# Patient Record
Sex: FEMALE | Race: WHITE | Employment: OTHER
[De-identification: names, ages, dates, MRNs, and addresses within clinical notes are randomized per-mention and may not be internally consistent; named-entity substitution may affect disease eponyms.]

---

## 2022-02-21 ENCOUNTER — HOSPITAL ENCOUNTER (OUTPATIENT)
Dept: CT IMAGING | Facility: CLINIC | Age: 67
Discharge: HOME | End: 2022-02-23

## 2022-02-21 DIAGNOSIS — M43.17 ACQUIRED SPONDYLOLISTHESIS OF LUMBOSACRAL REGION: ICD-10-CM

## 2022-02-21 DIAGNOSIS — M54.17 RADICULOPATHY, LUMBOSACRAL REGION: ICD-10-CM

## 2022-07-06 ENCOUNTER — APPOINTMENT (OUTPATIENT)
Dept: CT IMAGING | Age: 67
End: 2022-07-06
Payer: MEDICARE

## 2022-07-06 ENCOUNTER — HOSPITAL ENCOUNTER (EMERGENCY)
Age: 67
Discharge: HOME | End: 2022-07-06
Attending: EMERGENCY MEDICINE
Payer: MEDICARE

## 2022-07-06 VITALS
HEART RATE: 68 BPM | TEMPERATURE: 97.8 F | RESPIRATION RATE: 14 BRPM | DIASTOLIC BLOOD PRESSURE: 78 MMHG | OXYGEN SATURATION: 97 % | SYSTOLIC BLOOD PRESSURE: 133 MMHG

## 2022-07-06 DIAGNOSIS — N39.0 URINARY TRACT INFECTION WITH HEMATURIA, SITE UNSPECIFIED: Primary | ICD-10-CM

## 2022-07-06 DIAGNOSIS — R31.9 URINARY TRACT INFECTION WITH HEMATURIA, SITE UNSPECIFIED: Primary | ICD-10-CM

## 2022-07-06 LAB
ABSOLUTE EOS #: 0 K/UL (ref 0–0.4)
ABSOLUTE LYMPH #: 0.45 K/UL (ref 1–4.8)
ABSOLUTE MONO #: 0.18 K/UL (ref 0.1–1.3)
ABSOLUTE RETIC #: 0.07 M/UL (ref 0.02–0.1)
ALBUMIN SERPL-MCNC: 3.7 G/DL (ref 3.5–5.2)
ALP BLD-CCNC: 139 U/L (ref 35–104)
ALT SERPL-CCNC: 10 U/L (ref 5–33)
ANION GAP SERPL CALCULATED.3IONS-SCNC: 10 MMOL/L (ref 9–17)
AST SERPL-CCNC: 26 U/L
BACTERIA: ABNORMAL
BASOPHILS # BLD: 0 % (ref 0–2)
BASOPHILS ABSOLUTE: 0 K/UL (ref 0–0.2)
BILIRUB SERPL-MCNC: 1.59 MG/DL (ref 0.3–1.2)
BILIRUBIN URINE: NEGATIVE
BUN BLDV-MCNC: 9 MG/DL (ref 8–23)
CALCIUM SERPL-MCNC: 9.2 MG/DL (ref 8.6–10.4)
CASTS UA: ABNORMAL /LPF
CHLORIDE BLD-SCNC: 103 MMOL/L (ref 98–107)
CO2: 26 MMOL/L (ref 20–31)
COLOR: YELLOW
CREAT SERPL-MCNC: 0.85 MG/DL (ref 0.5–0.9)
EOSINOPHILS RELATIVE PERCENT: 0 % (ref 0–4)
EPITHELIAL CELLS UA: ABNORMAL /HPF
GFR AFRICAN AMERICAN: >60 ML/MIN
GFR NON-AFRICAN AMERICAN: >60 ML/MIN
GFR SERPL CREATININE-BSD FRML MDRD: ABNORMAL ML/MIN/{1.73_M2}
GLUCOSE BLD-MCNC: 117 MG/DL (ref 70–99)
GLUCOSE URINE: NEGATIVE
HCT VFR BLD CALC: 41.3 % (ref 36–46)
HEMOGLOBIN: 14 G/DL (ref 12–16)
KETONES, URINE: ABNORMAL
LEUKOCYTE ESTERASE, URINE: ABNORMAL
LIPASE: 45 U/L (ref 13–60)
LYMPHOCYTES # BLD: 10 % (ref 24–44)
MCH RBC QN AUTO: 38.3 PG (ref 26–34)
MCHC RBC AUTO-ENTMCNC: 34 G/DL (ref 31–37)
MCV RBC AUTO: 112.4 FL (ref 80–100)
MONOCYTES # BLD: 4 % (ref 1–7)
MORPHOLOGY: ABNORMAL
MORPHOLOGY: ABNORMAL
NITRITE, URINE: NEGATIVE
PDW BLD-RTO: 18.6 % (ref 11.5–14.9)
PH UA: 7 (ref 5–8)
PLATELET # BLD: 129 K/UL (ref 150–450)
PMV BLD AUTO: 8.7 FL (ref 6–12)
POTASSIUM SERPL-SCNC: 4.2 MMOL/L (ref 3.7–5.3)
PROTEIN UA: ABNORMAL
RBC # BLD: 3.67 M/UL (ref 4–5.2)
RBC UA: ABNORMAL /HPF
RETIC %: 2 % (ref 0.5–2)
SEG NEUTROPHILS: 86 % (ref 36–66)
SEGMENTED NEUTROPHILS ABSOLUTE COUNT: 3.87 K/UL (ref 1.3–9.1)
SODIUM BLD-SCNC: 139 MMOL/L (ref 135–144)
SPECIFIC GRAVITY UA: 1.01 (ref 1–1.03)
TOTAL PROTEIN: 7.1 G/DL (ref 6.4–8.3)
TURBIDITY: ABNORMAL
URINE HGB: ABNORMAL
UROBILINOGEN, URINE: NORMAL
WBC # BLD: 4.5 K/UL (ref 3.5–11)
WBC UA: ABNORMAL /HPF

## 2022-07-06 PROCEDURE — 36415 COLL VENOUS BLD VENIPUNCTURE: CPT

## 2022-07-06 PROCEDURE — 80053 COMPREHEN METABOLIC PANEL: CPT

## 2022-07-06 PROCEDURE — 99284 EMERGENCY DEPT VISIT MOD MDM: CPT

## 2022-07-06 PROCEDURE — 85025 COMPLETE CBC W/AUTO DIFF WBC: CPT

## 2022-07-06 PROCEDURE — 6370000000 HC RX 637 (ALT 250 FOR IP): Performed by: EMERGENCY MEDICINE

## 2022-07-06 PROCEDURE — 74176 CT ABD & PELVIS W/O CONTRAST: CPT

## 2022-07-06 PROCEDURE — 83690 ASSAY OF LIPASE: CPT

## 2022-07-06 PROCEDURE — 85045 AUTOMATED RETICULOCYTE COUNT: CPT

## 2022-07-06 PROCEDURE — 81001 URINALYSIS AUTO W/SCOPE: CPT

## 2022-07-06 RX ORDER — 0.9 % SODIUM CHLORIDE 0.9 %
80 INTRAVENOUS SOLUTION INTRAVENOUS ONCE
Status: DISCONTINUED | OUTPATIENT
Start: 2022-07-06 | End: 2022-07-06

## 2022-07-06 RX ORDER — SODIUM CHLORIDE 0.9 % (FLUSH) 0.9 %
10 SYRINGE (ML) INJECTION PRN
Status: DISCONTINUED | OUTPATIENT
Start: 2022-07-06 | End: 2022-07-06

## 2022-07-06 RX ORDER — SULFAMETHOXAZOLE AND TRIMETHOPRIM 800; 160 MG/1; MG/1
1 TABLET ORAL ONCE
Status: COMPLETED | OUTPATIENT
Start: 2022-07-06 | End: 2022-07-06

## 2022-07-06 RX ORDER — SODIUM CHLORIDE 0.9 % (FLUSH) 0.9 %
3 SYRINGE (ML) INJECTION EVERY 8 HOURS
Status: DISCONTINUED | OUTPATIENT
Start: 2022-07-06 | End: 2022-07-06 | Stop reason: HOSPADM

## 2022-07-06 RX ORDER — SULFAMETHOXAZOLE AND TRIMETHOPRIM 800; 160 MG/1; MG/1
1 TABLET ORAL 2 TIMES DAILY
Qty: 20 TABLET | Refills: 0 | Status: SHIPPED | OUTPATIENT
Start: 2022-07-06 | End: 2022-07-16

## 2022-07-06 RX ADMIN — SULFAMETHOXAZOLE AND TRIMETHOPRIM 1 TABLET: 800; 160 TABLET ORAL at 14:20

## 2022-07-06 ASSESSMENT — ENCOUNTER SYMPTOMS
DIARRHEA: 1
VOMITING: 0
SORE THROAT: 0
SHORTNESS OF BREATH: 0
COLOR CHANGE: 0
EYE REDNESS: 0
NAUSEA: 1
EYE DISCHARGE: 0
STRIDOR: 0
ABDOMINAL PAIN: 1
WHEEZING: 0
CONSTIPATION: 0
COUGH: 0
EYE PAIN: 0

## 2022-07-06 ASSESSMENT — PAIN SCALES - GENERAL: PAINLEVEL_OUTOF10: 5

## 2022-07-06 ASSESSMENT — PAIN DESCRIPTION - LOCATION: LOCATION: GENERALIZED

## 2022-07-06 ASSESSMENT — PAIN - FUNCTIONAL ASSESSMENT: PAIN_FUNCTIONAL_ASSESSMENT: 0-10

## 2022-07-06 ASSESSMENT — PAIN DESCRIPTION - DESCRIPTORS: DESCRIPTORS: ACHING

## 2022-07-06 NOTE — ED NOTES
Patient informed RN that she was uncomfortable and wanted to leave. RN offered to seek pain relief orders from physician, patient declined and stated she would be leaving. While informing Dr. Mady Crowley patient exited facility. No IV in place at the time of exit.  Patient alert and oriented with decision making capacity      Jarocho Hicks RN  07/06/22 [id number]

## 2022-07-06 NOTE — ED PROVIDER NOTES
[address] [facility]  EMERGENCY DEPARTMENT ENCOUNTER      Pt Name: Yuliana Dodge  MRN: [id number]  [place] 1955  Date of evaluation: 7/6/2022  Provider: Tarik Matthews MD    CHIEF COMPLAINT       Chief Complaint   Patient presents with    Abdominal Pain    Mouth Lesions       HISTORY OF PRESENT ILLNESS  (Location/Symptom, Timing/Onset, Context/Setting, Quality, Duration, Modifying Factors, Severity.)   Yuliana Dodge is a 74 y.o. female who presents to the emergency department complaining of lower abdominal pain and some frequent urination. She relates some diarrhea as well. Nothing seems to make it better or worse. She has not noticed any blood in the urine or stool. No fevers or chills. She does feel like she could throw up but cannot. Like if she could she might feel better. She relates she usually does not feel like this and feels like she can get through most things but this was uncomfortable. She also tells me that she is concerned that she may have some mouth lesions on the top and bottom inner lip. She relates the top was worse yesterday and that seems better but still has some bumps on the lower inner lip. They are tender. Nursing Notes were reviewed. REVIEW OF SYSTEMS    (2-9 systems for level 4, 10 or more for level 5)     Review of Systems   Constitutional: Negative for activity change, appetite change, chills, fatigue and fever. HENT: Positive for mouth sores. Negative for congestion, ear pain and sore throat. Eyes: Negative for pain, discharge and redness. Respiratory: Negative for cough, shortness of breath, wheezing and stridor. Cardiovascular: Negative for chest pain. Gastrointestinal: Positive for abdominal pain, diarrhea and nausea. Negative for constipation and vomiting. Genitourinary: Positive for frequency. Negative for decreased urine volume and difficulty urinating. Musculoskeletal: Negative for arthralgias and myalgias.    Skin: Negative for color change and rash. Neurological: Negative for dizziness, weakness and headaches. Psychiatric/Behavioral: Negative for behavioral problems and confusion. Except as noted above the remainder of the review of systems was reviewed and negative. PAST MEDICAL HISTORY     Past Medical History:   Diagnosis Date    Chronic back pain     S/P L4/L5 OR    Hypertension     Hypothyroidism        SURGICAL HISTORY       Past Surgical History:   Procedure Laterality Date    CHOLECYSTECTOMY      HIP FRACTURE SURGERY Right 2016    closed reduction percutaneous pinning    HYSTERECTOMY (CERVIX STATUS UNKNOWN)      SPINE SURGERY      L4/L5       CURRENT MEDICATIONS       Previous Medications    ATENOLOL (TENORMIN) 25 MG TABLET    Take 25 mg by mouth daily    DOCUSATE CALCIUM (STOOL SOFTENER PO)    Take 2 capsules by mouth as needed. LEVOTHYROXINE (SYNTHROID) 125 MCG TABLET    Take 125 mcg by mouth Daily. POTASSIUM CHLORIDE SA (K-DUR;KLOR-CON M) 10 MEQ TABLET    Take 20 mEq by mouth 2 times daily. PREGABALIN (LYRICA) 100 MG CAPSULE    Take 100 mg by mouth 2 times daily. SILVER SULFADIAZINE (SILVADENE) 1 % CREAM    Apply topically twice a day    TAPENTADOL HCL (NUCYNTA) 75 MG TABS    Take 75 mg by mouth 2 times daily. VITAMIN D (ERGOCALCIFEROL) 09111 UNITS CAPSULE    Take 1 capsule by mouth once a week for 7 doses       ALLERGIES     Patient has no known allergies. FAMILY HISTORY           Problem Relation Age of Onset    High Blood Pressure Mother     Other Mother         low heart rate     Pacemaker Mother     Heart Attack Father     Lung Cancer Father     Cancer Sister         throat cancer x2    Breast Cancer Paternal Aunt     Cancer Paternal Aunt         multiple other cancers      Family Status   Relation Name Status    Mother     Murtaza Santoro Father      Sister  (Not Specified)    PAunt  (Not Specified)        SOCIAL HISTORY      reports that she has never smoked. She has never used smokeless tobacco. She reports that she does not drink alcohol and does not use drugs. PHYSICAL EXAM    (up to 7 for level 4, 8 or more for level 5)     ED Triage Vitals [07/06/22 1211]   BP Temp Temp src Heart Rate Resp SpO2 Height Weight   128/67 97.8 °F (36.6 °C) -- 74 16 98 % -- --     Physical Exam  Vitals and nursing note reviewed. Constitutional:       General: She is not in acute distress. Appearance: She is well-developed. She is not ill-appearing, toxic-appearing or diaphoretic. HENT:      Head: Normocephalic and atraumatic. Comments: I do not appreciate any mouth lesions when I look. No sign of thrush. Right Ear: External ear normal.      Left Ear: External ear normal.      Nose: Nose normal.      Mouth/Throat:      Mouth: Mucous membranes are moist.   Eyes:      General:         Right eye: No discharge. Left eye: No discharge. Extraocular Movements: Extraocular movements intact. Conjunctiva/sclera: Conjunctivae normal.      Pupils: Pupils are equal, round, and reactive to light. Cardiovascular:      Rate and Rhythm: Normal rate and regular rhythm. Heart sounds: Normal heart sounds. No murmur heard. Pulmonary:      Effort: Pulmonary effort is normal. No respiratory distress. Breath sounds: Normal breath sounds. No wheezing, rhonchi or rales. Chest:      Chest wall: No tenderness. Abdominal:      General: Bowel sounds are normal. There is no distension. Palpations: Abdomen is soft. There is no mass. Tenderness: There is no abdominal tenderness. There is no guarding or rebound. Musculoskeletal:         General: Normal range of motion. Cervical back: Normal range of motion and neck supple. Right lower leg: No edema. Left lower leg: No edema. Lymphadenopathy:      Cervical: No cervical adenopathy. Skin:     General: Skin is warm. Findings: No rash.    Neurological:      General: No focal deficit present. Mental Status: She is alert and oriented to person, place, and time. Motor: No abnormal muscle tone. Psychiatric:         Mood and Affect: Mood normal.         Behavior: Behavior normal.         DIAGNOSTIC RESULTS     EKG: All EKG's are interpreted by the Emergency Department Physician who either signs or Co-signs this chart in the absence of a cardiologist.    none    RADIOLOGY:   Non-plain film images such as CT, Ultrasound and MRI are read by the radiologist. Plain radiographic images are visualized and preliminarily interpreted by the emergency physician with the below findings:    Interpretation per the Radiologist below, if available at the time of this note:    CT ABDOMEN PELVIS WO CONTRAST Additional Contrast? None    (Results Pending)         ED BEDSIDE ULTRASOUND:   Performed by ED Physician - none    LABS:  Labs Reviewed   URINALYSIS WITH MICROSCOPIC - Abnormal; Notable for the following components:       Result Value    Turbidity UA Cloudy (*)     Ketones, Urine SMALL (*)     Urine Hgb SMALL (*)     Protein, UA TRACE (*)     Leukocyte Esterase, Urine LARGE (*)     All other components within normal limits   CBC WITH AUTO DIFFERENTIAL - Abnormal; Notable for the following components:    RBC 3.67 (*)     .4 (*)     MCH 38.3 (*)     RDW 18.6 (*)     Platelets 517 (*)     Seg Neutrophils 86 (*)     Lymphocytes 10 (*)     Absolute Lymph # 0.45 (*)     All other components within normal limits   COMPREHENSIVE METABOLIC PANEL - Abnormal; Notable for the following components:    Glucose 117 (*)     Alkaline Phosphatase 139 (*)     Total Bilirubin 1.59 (*)     All other components within normal limits   LIPASE   RETICULOCYTES   PERIPHERAL BLOOD SMEAR, PATH REVIEW       All other labs were within normal range or not returned as of this dictation.     EMERGENCY DEPARTMENT COURSE and DIFFERENTIAL DIAGNOSIS/MDM:   Vitals:    Vitals:    07/06/22 1211 07/06/22 1221 07/06/22 1421   BP: 128/67 133/78   Pulse: 74 74 68   Resp: 16  14   Temp: 97.8 °F (36.6 °C)     SpO2: 98%  97%     We did discuss lab work and urine. Certainly we will go ahead and give her some nausea medication as well. She is comfortable with plan of care and verbalizes understanding. I answered any questions she has at this time. Patient relates that she is planning on leaving at 3:00 if results are not back. She is more comfortable at home and would like to be discharged. CT imaging result is not back yet at the time. But I have already sent her prescription over for UTI and she is aware of this. We will plan on calling her if CT is abnormal.  But she will wait about 10 minutes she relates. CONSULTS:  None    PROCEDURES:  None    FINAL IMPRESSION      1.  Urinary tract infection with hematuria, site unspecified          DISPOSITION/PLAN   DISPOSITION  [facility]      PATIENT REFERRED TO:  Inez Marie, [id number] [place] [address]  [phone]    Call in 2 days        DISCHARGE MEDICATIONS:  New Prescriptions    SULFAMETHOXAZOLE-TRIMETHOPRIM (BACTRIM DS) 800-160 MG PER TABLET    Take 1 tablet by mouth 2 times daily for 10 days       (Please note that portions of this note were completed with a voice recognition program.  Efforts were made to edit the dictations but occasionally words are mis-transcribed.)    Bobbi Price MD  Attending Emergency Physician        Bobbi Price MD  07/06/22 [id number]

## 2022-07-07 LAB
PATHOLOGIST REVIEW: NORMAL
SURGICAL PATHOLOGY REPORT: NORMAL

## 2022-07-16 ENCOUNTER — APPOINTMENT (OUTPATIENT)
Dept: CT IMAGING | Age: 67
End: 2022-07-16
Payer: MEDICARE

## 2022-07-16 ENCOUNTER — APPOINTMENT (OUTPATIENT)
Dept: GENERAL RADIOLOGY | Age: 67
End: 2022-07-16
Payer: MEDICARE

## 2022-07-16 ENCOUNTER — HOSPITAL ENCOUNTER (EMERGENCY)
Age: 67
Discharge: HOME | End: 2022-07-16
Attending: EMERGENCY MEDICINE
Payer: MEDICARE

## 2022-07-16 VITALS
OXYGEN SATURATION: 100 % | DIASTOLIC BLOOD PRESSURE: 69 MMHG | HEART RATE: 70 BPM | BODY MASS INDEX: 29.92 KG/M2 | HEIGHT: 70 IN | SYSTOLIC BLOOD PRESSURE: 119 MMHG | RESPIRATION RATE: 16 BRPM | TEMPERATURE: 98 F | WEIGHT: 209 LBS

## 2022-07-16 DIAGNOSIS — R06.02 SHORTNESS OF BREATH: Primary | ICD-10-CM

## 2022-07-16 DIAGNOSIS — R91.1 LUNG NODULE: ICD-10-CM

## 2022-07-16 DIAGNOSIS — R05.9 COUGH: ICD-10-CM

## 2022-07-16 LAB
ABSOLUTE EOS #: 0 K/UL (ref 0–0.44)
ABSOLUTE IMMATURE GRANULOCYTE: 0.04 K/UL (ref 0–0.3)
ABSOLUTE LYMPH #: 0.55 K/UL (ref 1.1–3.7)
ABSOLUTE MONO #: 0.08 K/UL (ref 0.1–1.2)
ALBUMIN SERPL-MCNC: 4.5 G/DL (ref 3.5–5.2)
ALP BLD-CCNC: 158 U/L (ref 35–104)
ALT SERPL-CCNC: 14 U/L (ref 5–33)
ANION GAP SERPL CALCULATED.3IONS-SCNC: 17 MMOL/L (ref 9–17)
AST SERPL-CCNC: 32 U/L
BASOPHILS # BLD: 0 % (ref 0–2)
BASOPHILS ABSOLUTE: 0 K/UL (ref 0–0.2)
BILIRUB SERPL-MCNC: 1.47 MG/DL (ref 0.3–1.2)
BUN BLDV-MCNC: 19 MG/DL (ref 8–23)
BUN/CREAT BLD: 19 (ref 9–20)
CALCIUM SERPL-MCNC: 9.7 MG/DL (ref 8.6–10.4)
CHLORIDE BLD-SCNC: 92 MMOL/L (ref 98–107)
CO2: 25 MMOL/L (ref 20–31)
CREAT SERPL-MCNC: 1.01 MG/DL (ref 0.5–0.9)
EKG ATRIAL RATE: 70 BPM
EKG P AXIS: 11 DEGREES
EKG P-R INTERVAL: 204 MS
EKG Q-T INTERVAL: 426 MS
EKG QRS DURATION: 92 MS
EKG QTC CALCULATION (BAZETT): 460 MS
EKG R AXIS: 25 DEGREES
EKG T AXIS: -37 DEGREES
EKG VENTRICULAR RATE: 70 BPM
EOSINOPHILS RELATIVE PERCENT: 0 % (ref 1–4)
GFR AFRICAN AMERICAN: >60 ML/MIN
GFR NON-AFRICAN AMERICAN: 55 ML/MIN
GFR SERPL CREATININE-BSD FRML MDRD: ABNORMAL ML/MIN/{1.73_M2}
GLUCOSE BLD-MCNC: 87 MG/DL (ref 70–99)
HCT VFR BLD CALC: 40.9 % (ref 36.3–47.1)
HEMOGLOBIN: 14.4 G/DL (ref 11.9–15.1)
IMMATURE GRANULOCYTES: 1 %
LYMPHOCYTES # BLD: 13 % (ref 24–43)
MCH RBC QN AUTO: 39.7 PG (ref 25.2–33.5)
MCHC RBC AUTO-ENTMCNC: 35.2 G/DL (ref 28.4–34.8)
MCV RBC AUTO: 112.7 FL (ref 82.6–102.9)
MONOCYTES # BLD: 2 % (ref 3–12)
MORPHOLOGY: ABNORMAL
NRBC AUTOMATED: 0 PER 100 WBC
PDW BLD-RTO: 15.6 % (ref 11.8–14.4)
PLATELET # BLD: 106 K/UL (ref 138–453)
PMV BLD AUTO: 10.9 FL (ref 8.1–13.5)
POTASSIUM SERPL-SCNC: 3.9 MMOL/L (ref 3.7–5.3)
PRO-BNP: 178 PG/ML
RBC # BLD: 3.63 M/UL (ref 3.95–5.11)
SEG NEUTROPHILS: 84 % (ref 36–65)
SEGMENTED NEUTROPHILS ABSOLUTE COUNT: 3.53 K/UL (ref 1.5–8.1)
SODIUM BLD-SCNC: 134 MMOL/L (ref 135–144)
TOTAL PROTEIN: 8.1 G/DL (ref 6.4–8.3)
TROPONIN, HIGH SENSITIVITY: 9 NG/L (ref 0–14)
WBC # BLD: 4.2 K/UL (ref 3.5–11.3)

## 2022-07-16 PROCEDURE — 99285 EMERGENCY DEPT VISIT HI MDM: CPT

## 2022-07-16 PROCEDURE — 2580000003 HC RX 258: Performed by: EMERGENCY MEDICINE

## 2022-07-16 PROCEDURE — 6360000004 HC RX CONTRAST MEDICATION: Performed by: EMERGENCY MEDICINE

## 2022-07-16 PROCEDURE — 71045 X-RAY EXAM CHEST 1 VIEW: CPT

## 2022-07-16 PROCEDURE — 93005 ELECTROCARDIOGRAM TRACING: CPT | Performed by: EMERGENCY MEDICINE

## 2022-07-16 PROCEDURE — 71260 CT THORAX DX C+: CPT | Performed by: EMERGENCY MEDICINE

## 2022-07-16 PROCEDURE — 85025 COMPLETE CBC W/AUTO DIFF WBC: CPT

## 2022-07-16 PROCEDURE — 84484 ASSAY OF TROPONIN QUANT: CPT

## 2022-07-16 PROCEDURE — 80053 COMPREHEN METABOLIC PANEL: CPT

## 2022-07-16 PROCEDURE — 83880 ASSAY OF NATRIURETIC PEPTIDE: CPT

## 2022-07-16 PROCEDURE — 93010 ELECTROCARDIOGRAM REPORT: CPT | Performed by: INTERNAL MEDICINE

## 2022-07-16 RX ORDER — SODIUM CHLORIDE 0.9 % (FLUSH) 0.9 %
10 SYRINGE (ML) INJECTION PRN
Status: DISCONTINUED | OUTPATIENT
Start: 2022-07-16 | End: 2022-07-16 | Stop reason: HOSPADM

## 2022-07-16 RX ORDER — BENZONATATE 100 MG/1
100 CAPSULE ORAL 3 TIMES DAILY PRN
Qty: 21 CAPSULE | Refills: 0 | Status: SHIPPED | OUTPATIENT
Start: 2022-07-16 | End: 2022-07-23

## 2022-07-16 RX ORDER — 0.9 % SODIUM CHLORIDE 0.9 %
100 INTRAVENOUS SOLUTION INTRAVENOUS ONCE
Status: COMPLETED | OUTPATIENT
Start: 2022-07-16 | End: 2022-07-16

## 2022-07-16 RX ORDER — AZITHROMYCIN 250 MG/1
250 TABLET, FILM COATED ORAL SEE ADMIN INSTRUCTIONS
Qty: 6 TABLET | Refills: 0 | Status: SHIPPED | OUTPATIENT
Start: 2022-07-16 | End: 2022-07-21

## 2022-07-16 RX ADMIN — IOPAMIDOL 75 ML: 755 INJECTION, SOLUTION INTRAVENOUS at 11:54

## 2022-07-16 RX ADMIN — SODIUM CHLORIDE 80 ML: 9 INJECTION, SOLUTION INTRAVENOUS at 11:55

## 2022-07-16 RX ADMIN — SODIUM CHLORIDE, PRESERVATIVE FREE 10 ML: 5 INJECTION INTRAVENOUS at 11:54

## 2022-07-16 ASSESSMENT — ENCOUNTER SYMPTOMS
SHORTNESS OF BREATH: 1
SORE THROAT: 0
RHINORRHEA: 0
BACK PAIN: 0
ABDOMINAL PAIN: 0
COUGH: 1

## 2022-07-16 ASSESSMENT — PAIN - FUNCTIONAL ASSESSMENT: PAIN_FUNCTIONAL_ASSESSMENT: NONE - DENIES PAIN

## 2022-07-18 ENCOUNTER — HOSPITAL ENCOUNTER (OUTPATIENT)
Age: 67
Setting detail: SPECIMEN
Discharge: HOME | End: 2022-07-18

## 2022-07-18 LAB
ALPHA-1 ANTITRYPSIN: 210 MG/DL (ref 90–200)
IGA: 285 MG/DL (ref 70–400)
IGE: 105 IU/ML
IGG: 1587 MG/DL (ref 700–1600)
IGM: 160 MG/DL (ref 40–230)

## 2022-07-19 PROBLEM — E55.9 VITAMIN D DEFICIENCY: Status: ACTIVE | Noted: 2022-07-19

## 2022-07-19 PROBLEM — R91.1 PULMONARY NODULE: Status: ACTIVE | Noted: 2022-07-19

## 2022-07-19 PROBLEM — R06.02 SHORTNESS OF BREATH ON EXERTION: Status: ACTIVE | Noted: 2022-07-19

## 2022-07-19 PROBLEM — M15.9 PRIMARY OSTEOARTHRITIS INVOLVING MULTIPLE JOINTS: Status: ACTIVE | Noted: 2017-06-26

## 2022-07-19 PROBLEM — Z96.643 HISTORY OF BILATERAL HIP REPLACEMENTS: Status: ACTIVE | Noted: 2022-07-19

## 2022-07-19 PROBLEM — Z99.81 OXYGEN DEPENDENT: Status: ACTIVE | Noted: 2022-07-19

## 2022-07-19 PROBLEM — Z90.49 STATUS POST CHOLECYSTECTOMY: Status: ACTIVE | Noted: 2022-07-19

## 2022-07-19 PROBLEM — M15.0 PRIMARY OSTEOARTHRITIS INVOLVING MULTIPLE JOINTS: Status: ACTIVE | Noted: 2017-06-26

## 2022-07-27 LAB
ALPHA-1 ANTITRYPSIN PHENOTYPE: ABNORMAL
ALPHA-1 ANTITRYPSIN: 206 MG/DL (ref 90–200)

## 2022-07-29 ENCOUNTER — HOSPITAL ENCOUNTER (OUTPATIENT)
Dept: NON INVASIVE DIAGNOSTICS | Age: 67
Discharge: HOME | End: 2022-07-29

## 2022-08-12 ENCOUNTER — APPOINTMENT (OUTPATIENT)
Dept: GENERAL RADIOLOGY | Age: 67
DRG: 690 | End: 2022-08-12
Payer: MEDICARE

## 2022-08-12 ENCOUNTER — HOSPITAL ENCOUNTER (OUTPATIENT)
Age: 67
Setting detail: SPECIMEN
Discharge: HOME | End: 2022-08-12

## 2022-08-12 ENCOUNTER — HOSPITAL ENCOUNTER (INPATIENT)
Age: 67
LOS: 2 days | Discharge: HOME | DRG: 690 | End: 2022-08-14
Attending: EMERGENCY MEDICINE | Admitting: INTERNAL MEDICINE
Payer: MEDICARE

## 2022-08-12 DIAGNOSIS — R06.02 SHORTNESS OF BREATH ON EXERTION: ICD-10-CM

## 2022-08-12 DIAGNOSIS — E03.9 ACQUIRED HYPOTHYROIDISM: ICD-10-CM

## 2022-08-12 DIAGNOSIS — E55.9 VITAMIN D DEFICIENCY: ICD-10-CM

## 2022-08-12 DIAGNOSIS — R79.9 ABNORMAL FINDING OF BLOOD CHEMISTRY, UNSPECIFIED: ICD-10-CM

## 2022-08-12 DIAGNOSIS — R30.0 DYSURIA: ICD-10-CM

## 2022-08-12 DIAGNOSIS — R62.7 FAILURE TO THRIVE IN ADULT: ICD-10-CM

## 2022-08-12 DIAGNOSIS — N28.9 RENAL INSUFFICIENCY: ICD-10-CM

## 2022-08-12 DIAGNOSIS — I10 PRIMARY HYPERTENSION: ICD-10-CM

## 2022-08-12 DIAGNOSIS — R30.0 DYSURIA: Primary | ICD-10-CM

## 2022-08-12 PROBLEM — E87.6 HYPOKALEMIA: Status: ACTIVE | Noted: 2022-08-12

## 2022-08-12 PROBLEM — N39.0 UTI (URINARY TRACT INFECTION): Status: ACTIVE | Noted: 2022-08-12

## 2022-08-12 LAB
ALBUMIN SERPL-MCNC: 3.6 G/DL (ref 3.5–5.2)
ALBUMIN SERPL-MCNC: 4.3 G/DL (ref 3.5–5.2)
ALBUMIN/GLOBULIN RATIO: 1.3 (ref 1–2.5)
ALBUMIN/GLOBULIN RATIO: 1.4 (ref 1–2.5)
ALP BLD-CCNC: 85 U/L (ref 35–104)
ALP BLD-CCNC: 95 U/L (ref 35–104)
ALT SERPL-CCNC: 25 U/L (ref 5–33)
ALT SERPL-CCNC: 28 U/L (ref 5–33)
ANION GAP SERPL CALCULATED.3IONS-SCNC: 17 MMOL/L (ref 9–17)
ANION GAP SERPL CALCULATED.3IONS-SCNC: 18 MMOL/L (ref 9–17)
AST SERPL-CCNC: 36 U/L
AST SERPL-CCNC: 40 U/L
BILIRUB SERPL-MCNC: 3.36 MG/DL (ref 0.3–1.2)
BILIRUB SERPL-MCNC: 4.38 MG/DL (ref 0.3–1.2)
BILIRUBIN URINE: ABNORMAL
BUN BLDV-MCNC: 27 MG/DL (ref 8–23)
BUN BLDV-MCNC: 28 MG/DL (ref 8–23)
CALCIUM SERPL-MCNC: 8.9 MG/DL (ref 8.6–10.4)
CALCIUM SERPL-MCNC: 9.8 MG/DL (ref 8.6–10.4)
CASTS UA: ABNORMAL /LPF (ref 0–8)
CHLORIDE BLD-SCNC: 91 MMOL/L (ref 98–107)
CHLORIDE BLD-SCNC: 94 MMOL/L (ref 98–107)
CHOLESTEROL/HDL RATIO: 3.2
CHOLESTEROL: 98 MG/DL
CO2: 27 MMOL/L (ref 20–31)
CO2: 31 MMOL/L (ref 20–31)
COLOR: ABNORMAL
CREAT SERPL-MCNC: 0.74 MG/DL (ref 0.5–0.9)
CREAT SERPL-MCNC: 0.91 MG/DL (ref 0.5–0.9)
EPITHELIAL CELLS UA: ABNORMAL /HPF (ref 0–5)
GFR AFRICAN AMERICAN: >60 ML/MIN
GFR AFRICAN AMERICAN: >60 ML/MIN
GFR NON-AFRICAN AMERICAN: >60 ML/MIN
GFR NON-AFRICAN AMERICAN: >60 ML/MIN
GFR SERPL CREATININE-BSD FRML MDRD: ABNORMAL ML/MIN/{1.73_M2}
GFR SERPL CREATININE-BSD FRML MDRD: ABNORMAL ML/MIN/{1.73_M2}
GLUCOSE BLD-MCNC: 110 MG/DL (ref 70–99)
GLUCOSE BLD-MCNC: 89 MG/DL (ref 70–99)
GLUCOSE URINE: NEGATIVE
HCT VFR BLD CALC: 39 % (ref 36.3–47.1)
HDLC SERPL-MCNC: 31 MG/DL
HEMOGLOBIN: 13.7 G/DL (ref 11.9–15.1)
IRON: 247 UG/DL (ref 37–145)
KETONES, URINE: ABNORMAL
LACTIC ACID, WHOLE BLOOD: 2.6 MMOL/L (ref 0.7–2.1)
LDL CHOLESTEROL: 47 MG/DL (ref 0–130)
LEUKOCYTE ESTERASE, URINE: ABNORMAL
LIPASE: 156 U/L (ref 13–60)
MAGNESIUM: 2 MG/DL (ref 1.6–2.6)
MCH RBC QN AUTO: 40.5 PG (ref 25.2–33.5)
MCHC RBC AUTO-ENTMCNC: 35.1 G/DL (ref 28.4–34.8)
MCV RBC AUTO: 115.4 FL (ref 82.6–102.9)
NITRITE, URINE: POSITIVE
NRBC AUTOMATED: 0.8 PER 100 WBC
PDW BLD-RTO: 17.3 % (ref 11.8–14.4)
PH UA: 6 (ref 5–8)
PLATELET # BLD: 68 K/UL (ref 138–453)
PMV BLD AUTO: 12.1 FL (ref 8.1–13.5)
POTASSIUM SERPL-SCNC: 3.3 MMOL/L (ref 3.7–5.3)
POTASSIUM SERPL-SCNC: 3.6 MMOL/L (ref 3.7–5.3)
PROTEIN UA: ABNORMAL
RBC # BLD: 3.38 M/UL (ref 3.95–5.11)
RBC UA: ABNORMAL /HPF (ref 0–4)
REASON FOR REJECTION: NORMAL
SODIUM BLD-SCNC: 138 MMOL/L (ref 135–144)
SODIUM BLD-SCNC: 140 MMOL/L (ref 135–144)
SPECIFIC GRAVITY UA: 1.03 (ref 1–1.03)
THYROXINE, FREE: 1.02 NG/DL (ref 0.93–1.7)
TOTAL PROTEIN: 6.4 G/DL (ref 6.4–8.3)
TOTAL PROTEIN: 7.3 G/DL (ref 6.4–8.3)
TRIGL SERPL-MCNC: 102 MG/DL
TROPONIN, HIGH SENSITIVITY: 14 NG/L (ref 0–14)
TSH SERPL DL<=0.05 MIU/L-ACNC: 1.59 UIU/ML (ref 0.3–5)
TSH SERPL DL<=0.05 MIU/L-ACNC: 2.18 UIU/ML (ref 0.3–5)
TURBIDITY: CLEAR
URINE HGB: ABNORMAL
UROBILINOGEN, URINE: ABNORMAL
VITAMIN B-12: <150 PG/ML (ref 232–1245)
VITAMIN D 25-HYDROXY: 12.8 NG/ML
WBC # BLD: 5.1 K/UL (ref 3.5–11.3)
WBC UA: ABNORMAL /HPF (ref 0–5)
ZZ NTE CLEAN UP: ORDERED TEST: NORMAL
ZZ NTE WITH NAME CLEAN UP: SPECIMEN SOURCE: NORMAL

## 2022-08-12 PROCEDURE — 6360000002 HC RX W HCPCS: Performed by: STUDENT IN AN ORGANIZED HEALTH CARE EDUCATION/TRAINING PROGRAM

## 2022-08-12 PROCEDURE — 83690 ASSAY OF LIPASE: CPT

## 2022-08-12 PROCEDURE — 6360000002 HC RX W HCPCS

## 2022-08-12 PROCEDURE — 84443 ASSAY THYROID STIM HORMONE: CPT

## 2022-08-12 PROCEDURE — 2580000003 HC RX 258

## 2022-08-12 PROCEDURE — 36415 COLL VENOUS BLD VENIPUNCTURE: CPT

## 2022-08-12 PROCEDURE — 99285 EMERGENCY DEPT VISIT HI MDM: CPT

## 2022-08-12 PROCEDURE — G0378 HOSPITAL OBSERVATION PER HR: HCPCS

## 2022-08-12 PROCEDURE — 87186 SC STD MICRODIL/AGAR DIL: CPT

## 2022-08-12 PROCEDURE — 93005 ELECTROCARDIOGRAM TRACING: CPT | Performed by: STUDENT IN AN ORGANIZED HEALTH CARE EDUCATION/TRAINING PROGRAM

## 2022-08-12 PROCEDURE — 99222 1ST HOSP IP/OBS MODERATE 55: CPT | Performed by: INTERNAL MEDICINE

## 2022-08-12 PROCEDURE — 96372 THER/PROPH/DIAG INJ SC/IM: CPT

## 2022-08-12 PROCEDURE — 87040 BLOOD CULTURE FOR BACTERIA: CPT

## 2022-08-12 PROCEDURE — 81001 URINALYSIS AUTO W/SCOPE: CPT

## 2022-08-12 PROCEDURE — 6370000000 HC RX 637 (ALT 250 FOR IP)

## 2022-08-12 PROCEDURE — 96365 THER/PROPH/DIAG IV INF INIT: CPT

## 2022-08-12 PROCEDURE — 83735 ASSAY OF MAGNESIUM: CPT

## 2022-08-12 PROCEDURE — 87077 CULTURE AEROBIC IDENTIFY: CPT

## 2022-08-12 PROCEDURE — 84484 ASSAY OF TROPONIN QUANT: CPT

## 2022-08-12 PROCEDURE — 71046 X-RAY EXAM CHEST 2 VIEWS: CPT

## 2022-08-12 PROCEDURE — 2580000003 HC RX 258: Performed by: STUDENT IN AN ORGANIZED HEALTH CARE EDUCATION/TRAINING PROGRAM

## 2022-08-12 PROCEDURE — 80053 COMPREHEN METABOLIC PANEL: CPT

## 2022-08-12 PROCEDURE — 83605 ASSAY OF LACTIC ACID: CPT

## 2022-08-12 PROCEDURE — 87086 URINE CULTURE/COLONY COUNT: CPT

## 2022-08-12 PROCEDURE — 2060000000 HC ICU INTERMEDIATE R&B

## 2022-08-12 PROCEDURE — 96361 HYDRATE IV INFUSION ADD-ON: CPT

## 2022-08-12 RX ORDER — ACETAMINOPHEN 650 MG/1
650 SUPPOSITORY RECTAL EVERY 6 HOURS PRN
Status: DISCONTINUED | OUTPATIENT
Start: 2022-08-12 | End: 2022-08-14 | Stop reason: HOSPADM

## 2022-08-12 RX ORDER — SODIUM CHLORIDE 0.9 % (FLUSH) 0.9 %
5-40 SYRINGE (ML) INJECTION PRN
Status: DISCONTINUED | OUTPATIENT
Start: 2022-08-12 | End: 2022-08-14 | Stop reason: HOSPADM

## 2022-08-12 RX ORDER — GABAPENTIN 300 MG/1
300 CAPSULE ORAL 2 TIMES DAILY
Status: DISCONTINUED | OUTPATIENT
Start: 2022-08-12 | End: 2022-08-14 | Stop reason: HOSPADM

## 2022-08-12 RX ORDER — ONDANSETRON 4 MG/1
4 TABLET, ORALLY DISINTEGRATING ORAL EVERY 8 HOURS PRN
Status: DISCONTINUED | OUTPATIENT
Start: 2022-08-12 | End: 2022-08-14 | Stop reason: HOSPADM

## 2022-08-12 RX ORDER — SODIUM CHLORIDE 9 MG/ML
INJECTION, SOLUTION INTRAVENOUS CONTINUOUS
Status: DISCONTINUED | OUTPATIENT
Start: 2022-08-12 | End: 2022-08-14 | Stop reason: HOSPADM

## 2022-08-12 RX ORDER — FOLIC ACID 1 MG/1
1 TABLET ORAL DAILY
Status: DISCONTINUED | OUTPATIENT
Start: 2022-08-12 | End: 2022-08-14 | Stop reason: HOSPADM

## 2022-08-12 RX ORDER — MULTIVITAMIN WITH IRON
1 TABLET ORAL DAILY
Status: DISCONTINUED | OUTPATIENT
Start: 2022-08-12 | End: 2022-08-14 | Stop reason: HOSPADM

## 2022-08-12 RX ORDER — ATENOLOL 25 MG/1
25 TABLET ORAL DAILY
Status: DISCONTINUED | OUTPATIENT
Start: 2022-08-12 | End: 2022-08-14 | Stop reason: HOSPADM

## 2022-08-12 RX ORDER — 0.9 % SODIUM CHLORIDE 0.9 %
1000 INTRAVENOUS SOLUTION INTRAVENOUS ONCE
Status: COMPLETED | OUTPATIENT
Start: 2022-08-12 | End: 2022-08-12

## 2022-08-12 RX ORDER — ENOXAPARIN SODIUM 100 MG/ML
40 INJECTION SUBCUTANEOUS DAILY
Status: DISCONTINUED | OUTPATIENT
Start: 2022-08-12 | End: 2022-08-14 | Stop reason: HOSPADM

## 2022-08-12 RX ORDER — LEVOTHYROXINE SODIUM 0.1 MG/1
100 TABLET ORAL DAILY
Status: DISCONTINUED | OUTPATIENT
Start: 2022-08-12 | End: 2022-08-14 | Stop reason: HOSPADM

## 2022-08-12 RX ORDER — ACETAMINOPHEN 325 MG/1
650 TABLET ORAL EVERY 6 HOURS PRN
Status: DISCONTINUED | OUTPATIENT
Start: 2022-08-12 | End: 2022-08-14 | Stop reason: HOSPADM

## 2022-08-12 RX ORDER — SODIUM CHLORIDE 9 MG/ML
INJECTION, SOLUTION INTRAVENOUS PRN
Status: DISCONTINUED | OUTPATIENT
Start: 2022-08-12 | End: 2022-08-14 | Stop reason: HOSPADM

## 2022-08-12 RX ORDER — ERGOCALCIFEROL 1.25 MG/1
50000 CAPSULE ORAL WEEKLY
Status: DISCONTINUED | OUTPATIENT
Start: 2022-08-12 | End: 2022-08-14 | Stop reason: HOSPADM

## 2022-08-12 RX ORDER — POTASSIUM CHLORIDE 20 MEQ/1
40 TABLET, EXTENDED RELEASE ORAL ONCE
Status: COMPLETED | OUTPATIENT
Start: 2022-08-12 | End: 2022-08-12

## 2022-08-12 RX ORDER — POLYETHYLENE GLYCOL 3350 17 G/17G
17 POWDER, FOR SOLUTION ORAL DAILY PRN
Status: DISCONTINUED | OUTPATIENT
Start: 2022-08-12 | End: 2022-08-14 | Stop reason: HOSPADM

## 2022-08-12 RX ORDER — ONDANSETRON 2 MG/ML
4 INJECTION INTRAMUSCULAR; INTRAVENOUS EVERY 6 HOURS PRN
Status: DISCONTINUED | OUTPATIENT
Start: 2022-08-12 | End: 2022-08-14 | Stop reason: HOSPADM

## 2022-08-12 RX ORDER — SODIUM CHLORIDE 0.9 % (FLUSH) 0.9 %
5-40 SYRINGE (ML) INJECTION EVERY 12 HOURS SCHEDULED
Status: DISCONTINUED | OUTPATIENT
Start: 2022-08-12 | End: 2022-08-14 | Stop reason: HOSPADM

## 2022-08-12 RX ADMIN — LEVOTHYROXINE SODIUM 100 MCG: 100 TABLET ORAL at 19:59

## 2022-08-12 RX ADMIN — ALCOHOL 1 TABLET: 70.47 GEL TOPICAL at 19:59

## 2022-08-12 RX ADMIN — ERGOCALCIFEROL 50000 UNITS: 1.25 CAPSULE ORAL at 19:59

## 2022-08-12 RX ADMIN — ENOXAPARIN SODIUM 40 MG: 100 INJECTION SUBCUTANEOUS at 19:58

## 2022-08-12 RX ADMIN — CEFTRIAXONE SODIUM 1000 MG: 1 INJECTION, POWDER, FOR SOLUTION INTRAMUSCULAR; INTRAVENOUS at 17:42

## 2022-08-12 RX ADMIN — GABAPENTIN 300 MG: 300 CAPSULE ORAL at 19:59

## 2022-08-12 RX ADMIN — SODIUM CHLORIDE 1000 ML: 9 INJECTION, SOLUTION INTRAVENOUS at 14:00

## 2022-08-12 RX ADMIN — SODIUM CHLORIDE, PRESERVATIVE FREE 10 ML: 5 INJECTION INTRAVENOUS at 19:59

## 2022-08-12 RX ADMIN — FOLIC ACID 1 MG: 1 TABLET ORAL at 19:59

## 2022-08-12 RX ADMIN — SODIUM CHLORIDE: 9 INJECTION, SOLUTION INTRAVENOUS at 17:41

## 2022-08-12 RX ADMIN — POTASSIUM CHLORIDE 40 MEQ: 1500 TABLET, EXTENDED RELEASE ORAL at 21:40

## 2022-08-12 ASSESSMENT — ENCOUNTER SYMPTOMS
DIARRHEA: 0
CHEST TIGHTNESS: 0
BACK PAIN: 0
NAUSEA: 0
ABDOMINAL DISTENTION: 0
SORE THROAT: 1
SHORTNESS OF BREATH: 0
VOMITING: 0
ABDOMINAL PAIN: 0
CONSTIPATION: 1
CONSTIPATION: 0

## 2022-08-12 ASSESSMENT — PAIN - FUNCTIONAL ASSESSMENT: PAIN_FUNCTIONAL_ASSESSMENT: NONE - DENIES PAIN

## 2022-08-12 NOTE — ED NOTES
Per  Dr Steiner Overall call [facility] lab and have patient lab work marked stat that the patient had draw prior to the ER visit      Joseph James RN  08/12/22 [id number]

## 2022-08-12 NOTE — H&P
Positive for sore throat. Difficulty swallowing   Eyes:  Negative for visual disturbance. Respiratory:  Negative for chest tightness and shortness of breath. Cardiovascular:  Negative for chest pain. Gastrointestinal:  Negative for abdominal distention, abdominal pain and constipation. Genitourinary:  Positive for dysuria. Negative for frequency. PAST MEDICAL HISTORY     Past Medical History:   Diagnosis Date    Chronic back pain     S/P L4/L5 OR    Hypertension     Hypothyroidism        PAST SURGICAL HISTORY     Past Surgical History:   Procedure Laterality Date    CHOLECYSTECTOMY      HIP FRACTURE SURGERY Right 2016    closed reduction percutaneous pinning    HYSTERECTOMY (CERVIX STATUS UNKNOWN)      SPINE SURGERY      L4/L5       ALLERGIES     Patient has no known allergies. MEDICATIONS PRIOR TO ADMISSION     Prior to Admission medications    Medication Sig Start Date End Date Taking? Authorizing Provider   busPIRone (BUSPAR) 10 MG tablet Si-2 tablets by mouth TID prn. 22   MARIA M Parker CNP   gabapentin (NEURONTIN) 300 MG capsule Take 300 mg by mouth in the morning and 300 mg before bedtime. Historical Provider, MD   morphine (MSIR) 15 MG tablet Take 15 mg by mouth in the morning and 15 mg before bedtime. Historical Provider, MD   levothyroxine (SYNTHROID) 100 MCG tablet Take 1 tablet by mouth in the morning. 22   MARIA M Parker CNP   meloxicam (MOBIC) 7.5 MG tablet Take 1 tablet by mouth daily as needed for Pain 22   MARIA M Parker CNP   atenolol (TENORMIN) 25 MG tablet Take 25 mg by mouth daily    Historical Provider, MD   Docusate Calcium (STOOL SOFTENER PO) Take 2 capsules by mouth as needed.     Historical Provider, MD       SOCIAL HISTORY     Tobacco: None  Alcohol: None  Illicits: None  Occupation: None    FAMILY HISTORY     Family History   Problem Relation Age of Onset    High Blood Pressure Mother     Other Mother low heart rate     Pacemaker Mother     Heart Attack Father     Lung Cancer Father     Cancer Sister         throat cancer x2    Breast Cancer Paternal Aunt     Cancer Paternal Aunt         multiple other cancers        PHYSICAL EXAM     Vitals: /73   Pulse 82   Temp 97 °F (36.1 °C) (Oral)   Resp 17   Ht 5' 10\" (1.778 m)   Wt 199 lb (90.3 kg)   SpO2 97%   BMI 28.55 kg/m²   Tmax: Temp (24hrs), Av.2 °F (36.2 °C), Min:97 °F (36.1 °C), Max:97.3 °F (36.3 °C)    Last Body weight:   Wt Readings from Last 3 Encounters:   22 199 lb (90.3 kg)   22 205 lb 12.8 oz (93.4 kg)   22 209 lb (94.8 kg)     Body Mass Index : Body mass index is 28.55 kg/m². Physical Exam  Constitutional:       Appearance: Normal appearance. Comments: Weakness and fatigue   HENT:      Head: Normocephalic and atraumatic. Comments: Sore throat, difficulty swallowing     Nose: Nose normal.      Mouth/Throat:      Mouth: Mucous membranes are moist.      Pharynx: Oropharynx is clear. Eyes:      Extraocular Movements: Extraocular movements intact. Conjunctiva/sclera: Conjunctivae normal.      Pupils: Pupils are equal, round, and reactive to light. Cardiovascular:      Rate and Rhythm: Normal rate and regular rhythm. Pulses: Normal pulses. Heart sounds: Normal heart sounds. Pulmonary:      Effort: Pulmonary effort is normal.      Breath sounds: Normal breath sounds. Abdominal:      General: Bowel sounds are normal. There is no distension. Palpations: Abdomen is soft. Tenderness: There is no abdominal tenderness. Genitourinary:     Comments: Difficulty urinating, UTI. Musculoskeletal:      Cervical back: Normal range of motion and neck supple. Skin:     General: Skin is warm and dry. Neurological:      General: No focal deficit present. Mental Status: She is alert.    Psychiatric:      Comments: Patient has decreased mood, patient also has stopped eating drinking and taking her medications properly.          INVESTIGATIONS     Laboratory Testing:     Recent Results (from the past 24 hour(s))   Vitamin D 25 Hydroxy    Collection Time: 08/12/22 12:50 PM   Result Value Ref Range    Vit D, 25-Hydroxy 12.8 (L) >29.9 ng/mL   Vitamin B12    Collection Time: 08/12/22 12:50 PM   Result Value Ref Range    Vitamin B-12 <150 (L) 232 - 1245 pg/mL   Iron    Collection Time: 08/12/22 12:50 PM   Result Value Ref Range    Iron 247 (H) 37 - 145 ug/dL   T4, Free    Collection Time: 08/12/22 12:50 PM   Result Value Ref Range    Thyroxine, Free 1.02 0.93 - 1.70 ng/dL   TSH    Collection Time: 08/12/22 12:50 PM   Result Value Ref Range    TSH 2.18 0.30 - 5.00 uIU/mL   CBC    Collection Time: 08/12/22 12:50 PM   Result Value Ref Range    WBC 5.1 3.5 - 11.3 k/uL    RBC 3.38 (L) 3.95 - 5.11 m/uL    Hemoglobin 13.7 11.9 - 15.1 g/dL    Hematocrit 39.0 36.3 - 47.1 %    .4 (H) 82.6 - 102.9 fL    MCH 40.5 (H) 25.2 - 33.5 pg    MCHC 35.1 (H) 28.4 - 34.8 g/dL    RDW 17.3 (H) 11.8 - 14.4 %    Platelets 68 (L) 909 - 453 k/uL    MPV 12.1 8.1 - 13.5 fL    NRBC Automated 0.8 (H) 0.0 per 100 WBC   Lipid Panel    Collection Time: 08/12/22 12:50 PM   Result Value Ref Range    Cholesterol 98 <200 mg/dL    HDL 31 (L) >40 mg/dL    LDL Cholesterol 47 0 - 130 mg/dL    Chol/HDL Ratio 3.2 <5    Triglycerides 102 <150 mg/dL   Comprehensive Metabolic Panel    Collection Time: 08/12/22 12:50 PM   Result Value Ref Range    Glucose 110 (H) 70 - 99 mg/dL    BUN 28 (H) 8 - 23 mg/dL    Creatinine 0.91 (H) 0.50 - 0.90 mg/dL    Calcium 9.8 8.6 - 10.4 mg/dL    Sodium 140 135 - 144 mmol/L    Potassium 3.6 (L) 3.7 - 5.3 mmol/L    Chloride 91 (L) 98 - 107 mmol/L    CO2 31 20 - 31 mmol/L    Anion Gap 18 (H) 9 - 17 mmol/L    Alkaline Phosphatase 95 35 - 104 U/L    ALT 28 5 - 33 U/L    AST 40 (H) <32 U/L    Total Bilirubin 4.38 (H) 0.3 - 1.2 mg/dL    Total Protein 7.3 6.4 - 8.3 g/dL    Albumin 4.3 3.5 - 5.2 g/dL    Albumin/Globulin Ratio 1.4 1.0 - 2.5    GFR Non-African American >60 >60 mL/min    GFR African American >60 >60 mL/min    GFR Comment         SPECIMEN REJECTION    Collection Time: 08/12/22  2:06 PM   Result Value Ref Range    Specimen Source . URINE     Ordered Test UAX     Reason for Rejection       Unable to perform testing: Specimen quantity not sufficient. Urinalysis with Microscopic    Collection Time: 08/12/22  3:54 PM   Result Value Ref Range    Color, UA Orange (A) Yellow    Turbidity UA Clear Clear    Glucose, Ur NEGATIVE NEGATIVE    Bilirubin Urine NEGATIVE  Verified by ictotest. (A) NEGATIVE    Ketones, Urine MODERATE (A) NEGATIVE    Specific Gravity, UA 1.030 1.005 - 1.030    Urine Hgb SMALL (A) NEGATIVE    pH, UA 6.0 5.0 - 8.0    Protein, UA 2+ (A) NEGATIVE    Urobilinogen, Urine ELEVATED (A) Normal    Nitrite, Urine POSITIVE (A) NEGATIVE    Leukocyte Esterase, Urine SMALL (A) NEGATIVE    WBC, UA 0 TO 2 0 - 5 /HPF    RBC, UA 2 TO 5 0 - 4 /HPF    Casts UA  0 - 8 /LPF     5 TO 10 HYALINE Reference range defined for non-centrifuged specimen. Epithelial Cells UA 2 TO 5 0 - 5 /HPF       Imaging:   XR CHEST (2 VW)    Result Date: 8/12/2022  No acute cardiopulmonary disease       ASSESSMENT & PLAN     ASSESSMENT / PLAN:     IMPRESSION  This is a 74 y.o. female who presented with dysuria, sore throat, decreased appetite and not taking his medications and found to have UTI and hypokalemia. Patient admitted to inpatient status for antibiotics and further treatment. Principal Problem:    UTI (urinary tract infection)  -Ceftriaxone  - IV fluids  - Urine cultures ordered      Hypokalemia  -Replace potassium as needed  - BMP/CMP    Hypothyroidism:  - TSH with reflex ordered  - Home dose Synthroid ordered    Sore throat:  - Possible swallow study in a.m. Chronic back pain:  - Patient is on 15 mg of extended release morphine sulfate twice daily at home  - We will give pain medications as needed while admitted.     Decreased appetite and recent medication noncompliance:  - Patient seems to have dystonia, and depressive mood. Would recommend psych evaluation/follow-up we will discuss with attending in a.m. DVT ppx: Lovenox  GI ppx: None Zofran for nausea    PT/OT/SW: Consulted  Discharge Planning:  consulted    Angel Garcia MD  Internal Medicine Resident, PGY-1  [facility];  [place], [place]  8/12/2022, 5:09 PM

## 2022-08-12 NOTE — ED PROVIDER NOTES
[address] [facility]  ED  Emergency Department Encounter  Emergency Medicine Resident     Pt Marino Shannon  MRN: [id number]  [place] 1955  Date of evaluation: 8/12/22  PCP:  MARIA M Bryant CNP      CHIEF COMPLAINT       Chief Complaint   Patient presents with    Dysuria     Dysuria has been occurring for the last two weeks. There has been scant amounts of hematuria. HISTORY OF PRESENT ILLNESS  (Location/Symptom, Timing/Onset, Context/Setting, Quality, Duration, Modifying Factors, Severity.)      Marti Mckeon is a 74 y.o. female who presents with complaint of generalized fatigue as well as dysuria. Past medical history significant for chronic back pain, hypertension, hypothyroidism. According to patient she is felt fatigued for the past few weeks to months. Has been not feeling herself. According to daughter patient not been taking care of her self has not been taking her medications for the past week. Was recently seen at outlying facility approximately 1 month ago was diagnosed with UTI. Patient was given Bactrim however patient did not take her medications as prescribed and is still having symptoms. According to daughter patient has not been eating or drinking for the past week and is not had a bowel movement is barely urinating. Patient lives at home alone with her dogs. Does have good family support according to daughter as well as patient. Granddaughter they have been fighting more than usual and patient has been much more agitated. Patient otherwise she states she feels generally fatigued and admits to sore throat as well as some dysuria but denies any abdominal pain, nausea, vomiting, diarrhea, chest pain, shortness of breath, slurred speech, difficulty swallowing, one-sided weakness. PAST MEDICAL / SURGICAL / SOCIAL / FAMILY HISTORY      has a past medical history of Chronic back pain, Hypertension, and Hypothyroidism.        has a past surgical history that includes Spine surgery (); Hysterectomy (); Cholecystectomy; and Hip fracture surgery (Right, 2016). Social History     Socioeconomic History    Marital status:      Spouse name: Not on file    Number of children: Not on file    Years of education: Not on file    Highest education level: Not on file   Occupational History    Not on file   Tobacco Use    Smoking status: Never    Smokeless tobacco: Never   Vaping Use    Vaping Use: Never used   Substance and Sexual Activity    Alcohol use: No    Drug use: No    Sexual activity: Never   Other Topics Concern    Not on file   Social History Narrative    Not on file     Social Determinants of Health     Financial Resource Strain: Low Risk     Difficulty of Paying Living Expenses: Not hard at all   Food Insecurity: No Food Insecurity    Worried About Running Out of Food in the Last Year: Never true    Ran Out of Food in the Last Year: Never true   Transportation Needs: Not on file   Physical Activity: Not on file   Stress: Not on file   Social Connections: Not on file   Intimate Partner Violence: Not on file   Housing Stability: Not on file       Family History   Problem Relation Age of Onset    High Blood Pressure Mother     Other Mother         low heart rate     Pacemaker Mother     Heart Attack Father     Lung Cancer Father     Cancer Sister         throat cancer x2    Breast Cancer Paternal Aunt     Cancer Paternal Aunt         multiple other cancers        Allergies:  Patient has no known allergies. Home Medications:  Prior to Admission medications    Medication Sig Start Date End Date Taking? Authorizing Provider   busPIRone (BUSPAR) 10 MG tablet Si-2 tablets by mouth TID prn. 22   MARIA M Ernandez - CNP   gabapentin (NEURONTIN) 300 MG capsule Take 300 mg by mouth in the morning and 300 mg before bedtime. Historical Provider, MD   morphine (MSIR) 15 MG tablet Take 15 mg by mouth in the morning and 15 mg before bedtime. Historical Provider, MD   levothyroxine (SYNTHROID) 100 MCG tablet Take 1 tablet by mouth in the morning. 7/19/22   MARIA M Fajardo CNP   meloxicam (MOBIC) 7.5 MG tablet Take 1 tablet by mouth daily as needed for Pain 7/19/22   MARIA M Fajardo CNP   atenolol (TENORMIN) 25 MG tablet Take 25 mg by mouth daily    Historical Provider, MD   Docusate Calcium (STOOL SOFTENER PO) Take 2 capsules by mouth as needed. Historical Provider, MD       REVIEW OF SYSTEMS    (2-9 systems for level 4, 10 or more for level 5)      Review of Systems   Constitutional:  Positive for fatigue. Negative for chills and fever. HENT:  Positive for sore throat. Negative for congestion. Eyes:  Negative for visual disturbance. Respiratory:  Negative for shortness of breath. Cardiovascular:  Negative for chest pain and leg swelling. Gastrointestinal:  Positive for constipation. Negative for abdominal pain, diarrhea, nausea and vomiting. Genitourinary:  Positive for dysuria. Negative for hematuria. Musculoskeletal:  Negative for back pain and neck pain. Skin:  Negative for rash and wound. Neurological:  Positive for weakness. Negative for numbness and headaches. PHYSICAL EXAM   (up to 7 for level 4, 8 or more for level 5)      INITIAL VITALS:   /73   Pulse 82   Temp 97 °F (36.1 °C) (Oral)   Resp 17   Ht 5' 10\" (1.778 m)   Wt 199 lb (90.3 kg)   SpO2 97%   BMI 28.55 kg/m²     Physical Exam  Constitutional:       General: She is not in acute distress. Appearance: She is not ill-appearing or diaphoretic. HENT:      Head: Normocephalic and atraumatic. Mouth/Throat:      Mouth: Mucous membranes are dry. Pharynx: No oropharyngeal exudate or posterior oropharyngeal erythema. Cardiovascular:      Rate and Rhythm: Regular rhythm. Tachycardia present. Heart sounds: No murmur heard. No friction rub. No gallop. Pulmonary:      Effort: No respiratory distress. Breath sounds:  No stridor. No wheezing, rhonchi or rales. Chest:      Chest wall: No tenderness. Abdominal:      General: There is no distension. Palpations: There is no mass. Tenderness: There is no abdominal tenderness. There is no guarding or rebound. Hernia: No hernia is present. Musculoskeletal:         General: No swelling, tenderness, deformity or signs of injury. Skin:     General: Skin is warm and dry. Capillary Refill: Capillary refill takes less than 2 seconds. Coloration: Skin is not jaundiced or pale. Findings: No bruising, erythema, lesion or rash. Neurological:      Mental Status: She is alert and oriented to person, place, and time. Cranial Nerves: No cranial nerve deficit. Sensory: No sensory deficit. Motor: No weakness.       Coordination: Coordination normal.       DIFFERENTIAL  DIAGNOSIS     PLAN (LABS / IMAGING / EKG):  Orders Placed This Encounter   Procedures    Culture, Urine    XR CHEST (2 VW)    CBC with Auto Differential    Comprehensive Metabolic Panel w/ Reflex to MG    Lipase    Urinalysis with Microscopic    TSH with Reflex    Troponin    Inpatient consult to Internal Medicine    EKG 12 Lead    ADMIT TO INPATIENT       MEDICATIONS ORDERED:  Orders Placed This Encounter   Medications    0.9 % sodium chloride bolus    cefTRIAXone (ROCEPHIN) 1,000 mg in sodium chloride 0.9 % 50 mL IVPB mini-bag     Order Specific Question:   Antimicrobial Indications     Answer:   Urinary Tract Infection    0.9 % sodium chloride infusion       DDX: UTI, pyelonephritis, arrhythmia, hypothyroidism, electrolyte abnormality, dehydration, CVA, pneumonia    DIAGNOSTIC RESULTS / EMERGENCY DEPARTMENT COURSE / MDM   LAB RESULTS:  Results for orders placed or performed during the hospital encounter of 08/12/22   Urinalysis with Microscopic   Result Value Ref Range    Color, UA Orange (A) Yellow    Turbidity UA Clear Clear    Glucose, Ur NEGATIVE NEGATIVE    Bilirubin Urine NEGATIVE  Verified by ictotest. (A) NEGATIVE    Ketones, Urine MODERATE (A) NEGATIVE    Specific Gravity, UA 1.030 1.005 - 1.030    Urine Hgb SMALL (A) NEGATIVE    pH, UA 6.0 5.0 - 8.0    Protein, UA 2+ (A) NEGATIVE    Urobilinogen, Urine ELEVATED (A) Normal    Nitrite, Urine POSITIVE (A) NEGATIVE    Leukocyte Esterase, Urine SMALL (A) NEGATIVE    WBC, UA 0 TO 2 0 - 5 /HPF    RBC, UA 2 TO 5 0 - 4 /HPF    Casts UA  0 - 8 /LPF     5 TO 10 HYALINE Reference range defined for non-centrifuged specimen. Epithelial Cells UA 2 TO 5 0 - 5 /HPF   EKG 12 Lead   Result Value Ref Range    Ventricular Rate 92 BPM    Atrial Rate 92 BPM    P-R Interval 202 ms    QRS Duration 72 ms    Q-T Interval 346 ms    QTc Calculation (Bazett) 427 ms    P Axis 14 degrees    R Axis 33 degrees    T Axis -93 degrees       IMPRESSION: Patient had outpatient labs performed prior to arrival.  These were grossly unremarkable however patient's urinalysis shows a UTI. RADIOLOGY:  XR CHEST (2 VW)   Final Result   No acute cardiopulmonary disease               EKG  Normal sinus rhythm, rate 92, normal axis, intervals appear within normal limits, , diffuse T wave versions noted in V3, V4, V5, V6 as well as lead II. No acute ST elevations noted. Findings consistent with prior EKGs. All EKG's are interpreted by the Emergency Department Physician who either signs or Co-signs this chart in the absence of a cardiologist.    EMERGENCY DEPARTMENT COURSE:  73-year-old female presenting with generalized fatigue as well as dysuria. Patient's been fatigued for the past few months and is having taking medications. Concern for failure to thrive at home. Labs grossly unremarkable besides urinalysis is consistent with UTI. No CVA tenderness on exam.  No focal neurologic deficits noticed low sufficient for CVA. No leukocytosis and patient does not meet SIRS criteria no indication for further work-up. EKG is stable for patient.   No negation of hypothyroidism. Patient given dose of IV ceftriaxone here in emergency department. Patient given normal saline bolus and admitted to internal medicine for further monitoring and care. Patient as well as family amenable to admission. ED Course as of 08/12/22 1823   Fri Aug 12, 2022   1452 Patient had labs drawn today for her outpatient physician. Attempted to call lab earlier they did not respond. Nursing staff was able to get a hold of lab and these will be sent over. [MS]      ED Course User Index  [MS] Katrina Yates DO       No notes of [facility] Admission Criteria type on file. PROCEDURES:  N/a    CONSULTS:  IP CONSULT TO INTERNAL MEDICINE  IP CONSULT TO CASE MANAGEMENT    CRITICAL CARE:  N/a    ED Course as of 08/12/22 1823   Fri Aug 12, 2022   1452 Patient had labs drawn today for her outpatient physician. Attempted to call lab earlier they did not respond. Nursing staff was able to get a hold of lab and these will be sent over. [MS]      ED Course User Index  [MS] Katrina Yates DO       FINAL IMPRESSION      1. Dysuria    2. Failure to thrive in adult          DISPOSITION / PLAN     DISPOSITION Admitted 08/12/2022 05:06:43 PM      PATIENT REFERRED TO:  No follow-up provider specified.     DISCHARGE MEDICATIONS:  New Prescriptions    No medications on file       Margaret Russell DO  Emergency Medicine Resident    (Please note that portions of thisnote were completed with a voice recognition program.  Efforts were made to edit the dictations but occasionally words are mis-transcribed.)        Katrina Yates DO  Resident  08/12/22 1825

## 2022-08-12 NOTE — PROGRESS NOTES
[address]    Admission Medication Reconciliation       The patient's list of current home medications has been reviewed. Source(s) of information: patient; family members    Based on information provided by the above source(s), no changes to the patient's home medication list were necessary. Please review the ACTION REQUESTED section of this note below for any discrepancies on current hospital orders. I changed or updated the following medications on the patient's home medication list:  Removed N/A     Added N/A     Adjusted   N/A   Other Notes Patient takes buspirone at home; usually will take 2 tablets TID  Patient has been having difficulty having bowel movements; takes docusate every day   Patient takes Morphine 15 mg twice daily along with her gabapentin 300 mg twice daily   Patient had recent Rx for Azithromycin but never got it (filled 7/16/22 x 5 days); also recently received 1 dose of Fluconazole 150 (filled 7/19/22) and Bactrim DS for 10 day (filled 7/6/22 x 10 days)  Patient never completed bactrim course     Please feel free to call me with any questions about this encounter. Thank you.     Thank you  Alexia Jackson, PharmD  8/12/2022 7:07 PM

## 2022-08-12 NOTE — H&P
Attending Supervising Physicians Attestation Statement  I have seen and examined Bernadette Larson and the jones elements of all parts of the encounter have been performed by me. I agree with the assessment, plan and orders as documented by the Advanced Practice Provider. I discussed the findings and plans with the resident physician and agree as documented in their note . In addition to the pertinent positives and negatives as stated within HPI and the review of systems as documented in the notes, all other systems were reviewed when able to and are reported negative. Additional Comments:   74 F presented with urinary retention  Recently diagnosed with UTI, did not complete antibiotics  Admitted for further treatment and abx    Principal Problem:    UTI (urinary tract infection)  Active Problems:    Hypokalemia    Acquired hypothyroidism  Resolved Problems:    * No resolved hospital problems.  *    - abx  - follow up culture  - pt/ot evaluation  - resume home meds     Electronically signed by Amy Rodriguez MD on 8/12/2022 at 6:40 PM

## 2022-08-12 NOTE — ED PROVIDER NOTES
[facility]  Emergency Department  Faculty Attestation     I performed a history and physical examination of the patient and discussed management with the resident. I reviewed the residents note and agree with the documented findings and plan of care. Any areas of disagreement are noted on the chart. I was personally present for the key portions of any procedures. I have documented in the chart those procedures where I was not present during the key portions. I have reviewed the emergency nurses triage note. I agree with the chief complaint, past medical history, past surgical history, allergies, medications, social and family history as documented unless otherwise noted below. For Physician Assistant/ Nurse Practitioner cases/documentation I have personally evaluated this patient and have completed at least one if not all key elements of the E/M (history, physical exam, and MDM). Additional findings are as noted. Primary Care Physician:  MARIA M Bales CNP    Screenings:  [unfilled]    CHIEF COMPLAINT       Chief Complaint   Patient presents with    Dysuria     Dysuria has been occurring for the last two weeks. There has been scant amounts of hematuria. RECENT VITALS:   Temp: 97.3 °F (36.3 °C),  Heart Rate: (!) 109, Resp: 16,      LABS:  Labs Reviewed   CULTURE, URINE   CBC WITH AUTO DIFFERENTIAL   COMPREHENSIVE METABOLIC PANEL W/ REFLEX TO MG FOR LOW K   LIPASE   URINALYSIS WITH MICROSCOPIC   TSH WITH REFLEX   TROPONIN       Radiology  XR CHEST (2 VW)    (Results Pending)       CRITICAL CARE: There was a high probability of clinically significant/life threatening deterioration in this patient's condition which required my urgent intervention. Total critical care time was none minutes. This excludes any time for separately reportable procedures.      EKG:  EKG Interpretation    Interpreted by me    Rhythm: normal sinus   Rate: normal  Axis: other metabolic disturbance. Plan is IV fluids, labs including blood work and urinalysis with culture, chest x-ray, consider CT brain. Tip Quintero.  Jame Bradshaw MD, [facility]  Attending Emergency  Physician               David Camp MD  08/12/22 0088

## 2022-08-12 NOTE — ED NOTES
Daughter states that they went for lab work and her mother was unable to urinate so they brought her here. Patient states that she hasnt been urinating and isnt eating well.       Ksenia Diego RN  08/12/22 1348

## 2022-08-13 LAB
ABSOLUTE EOS #: 0 K/UL (ref 0–0.44)
ABSOLUTE IMMATURE GRANULOCYTE: 0.08 K/UL (ref 0–0.3)
ABSOLUTE LYMPH #: 0.87 K/UL (ref 1.1–3.7)
ABSOLUTE MONO #: 0.15 K/UL (ref 0.1–1.2)
ANION GAP SERPL CALCULATED.3IONS-SCNC: 13 MMOL/L (ref 9–17)
BASOPHILS # BLD: 0 % (ref 0–2)
BASOPHILS ABSOLUTE: 0 K/UL (ref 0–0.2)
BUN BLDV-MCNC: 25 MG/DL (ref 8–23)
CALCIUM SERPL-MCNC: 8.7 MG/DL (ref 8.6–10.4)
CHLORIDE BLD-SCNC: 95 MMOL/L (ref 98–107)
CO2: 29 MMOL/L (ref 20–31)
CREAT SERPL-MCNC: 0.73 MG/DL (ref 0.5–0.9)
CULTURE: ABNORMAL
EKG ATRIAL RATE: 92 BPM
EKG P AXIS: 14 DEGREES
EKG P-R INTERVAL: 202 MS
EKG Q-T INTERVAL: 346 MS
EKG QRS DURATION: 72 MS
EKG QTC CALCULATION (BAZETT): 427 MS
EKG R AXIS: 33 DEGREES
EKG T AXIS: -93 DEGREES
EKG VENTRICULAR RATE: 92 BPM
EOSINOPHILS RELATIVE PERCENT: 0 % (ref 1–4)
GFR AFRICAN AMERICAN: >60 ML/MIN
GFR NON-AFRICAN AMERICAN: >60 ML/MIN
GFR SERPL CREATININE-BSD FRML MDRD: ABNORMAL ML/MIN/{1.73_M2}
GLUCOSE BLD-MCNC: 86 MG/DL (ref 70–99)
HAV IGM SER IA-ACNC: NONREACTIVE
HCT VFR BLD CALC: 32.9 % (ref 36.3–47.1)
HEMOGLOBIN: 12 G/DL (ref 11.9–15.1)
HEPATITIS B CORE IGM ANTIBODY: NONREACTIVE
HEPATITIS B SURFACE ANTIGEN: NONREACTIVE
HEPATITIS C ANTIBODY: NONREACTIVE
IMMATURE GRANULOCYTES: 2 %
LYMPHOCYTES # BLD: 23 % (ref 24–43)
MAGNESIUM: 2 MG/DL (ref 1.6–2.6)
MCH RBC QN AUTO: 41.2 PG (ref 25.2–33.5)
MCHC RBC AUTO-ENTMCNC: 36.5 G/DL (ref 28.4–34.8)
MCV RBC AUTO: 113.1 FL (ref 82.6–102.9)
MONOCYTES # BLD: 4 % (ref 3–12)
MORPHOLOGY: ABNORMAL
MORPHOLOGY: ABNORMAL
NRBC AUTOMATED: 1.1 PER 100 WBC
PDW BLD-RTO: 17.2 % (ref 11.8–14.4)
PLATELET # BLD: ABNORMAL K/UL (ref 138–453)
PLATELET, FLUORESCENCE: 44 K/UL (ref 138–453)
PLATELET, IMMATURE FRACTION: 7.3 % (ref 1.1–10.3)
POTASSIUM SERPL-SCNC: 3.1 MMOL/L (ref 3.7–5.3)
RBC # BLD: 2.91 M/UL (ref 3.95–5.11)
SEG NEUTROPHILS: 71 % (ref 36–65)
SEGMENTED NEUTROPHILS ABSOLUTE COUNT: 2.7 K/UL (ref 1.5–8.1)
SODIUM BLD-SCNC: 137 MMOL/L (ref 135–144)
SPECIMEN DESCRIPTION: ABNORMAL
WBC # BLD: 3.8 K/UL (ref 3.5–11.3)

## 2022-08-13 PROCEDURE — 6360000002 HC RX W HCPCS

## 2022-08-13 PROCEDURE — 93010 ELECTROCARDIOGRAM REPORT: CPT | Performed by: INTERNAL MEDICINE

## 2022-08-13 PROCEDURE — 99232 SBSQ HOSP IP/OBS MODERATE 35: CPT | Performed by: INTERNAL MEDICINE

## 2022-08-13 PROCEDURE — 6370000000 HC RX 637 (ALT 250 FOR IP)

## 2022-08-13 PROCEDURE — 2060000000 HC ICU INTERMEDIATE R&B

## 2022-08-13 PROCEDURE — 96375 TX/PRO/DX INJ NEW DRUG ADDON: CPT

## 2022-08-13 PROCEDURE — 85025 COMPLETE CBC W/AUTO DIFF WBC: CPT

## 2022-08-13 PROCEDURE — 2580000003 HC RX 258

## 2022-08-13 PROCEDURE — 36415 COLL VENOUS BLD VENIPUNCTURE: CPT

## 2022-08-13 PROCEDURE — 96376 TX/PRO/DX INJ SAME DRUG ADON: CPT

## 2022-08-13 PROCEDURE — 96372 THER/PROPH/DIAG INJ SC/IM: CPT

## 2022-08-13 PROCEDURE — 80074 ACUTE HEPATITIS PANEL: CPT

## 2022-08-13 PROCEDURE — 83735 ASSAY OF MAGNESIUM: CPT

## 2022-08-13 PROCEDURE — 80048 BASIC METABOLIC PNL TOTAL CA: CPT

## 2022-08-13 PROCEDURE — 2500000003 HC RX 250 WO HCPCS

## 2022-08-13 PROCEDURE — G0378 HOSPITAL OBSERVATION PER HR: HCPCS

## 2022-08-13 PROCEDURE — 85055 RETICULATED PLATELET ASSAY: CPT

## 2022-08-13 RX ORDER — BUSPIRONE HYDROCHLORIDE 5 MG/1
5 TABLET ORAL 3 TIMES DAILY
Status: DISCONTINUED | OUTPATIENT
Start: 2022-08-13 | End: 2022-08-14 | Stop reason: HOSPADM

## 2022-08-13 RX ORDER — POTASSIUM CHLORIDE 7.45 MG/ML
10 INJECTION INTRAVENOUS
Status: DISCONTINUED | OUTPATIENT
Start: 2022-08-13 | End: 2022-08-13

## 2022-08-13 RX ORDER — POTASSIUM CHLORIDE 20 MEQ/1
40 TABLET, EXTENDED RELEASE ORAL 2 TIMES DAILY WITH MEALS
Status: COMPLETED | OUTPATIENT
Start: 2022-08-13 | End: 2022-08-13

## 2022-08-13 RX ORDER — POTASSIUM CHLORIDE 7.45 MG/ML
40 INJECTION INTRAVENOUS ONCE
Status: DISCONTINUED | OUTPATIENT
Start: 2022-08-13 | End: 2022-08-13

## 2022-08-13 RX ORDER — MORPHINE SULFATE 15 MG/1
15 TABLET, FILM COATED, EXTENDED RELEASE ORAL ONCE
Status: COMPLETED | OUTPATIENT
Start: 2022-08-13 | End: 2022-08-13

## 2022-08-13 RX ADMIN — FOLIC ACID 1 MG: 1 TABLET ORAL at 09:03

## 2022-08-13 RX ADMIN — ACETAMINOPHEN 650 MG: 325 TABLET ORAL at 16:27

## 2022-08-13 RX ADMIN — POTASSIUM CHLORIDE 40 MEQ: 1500 TABLET, EXTENDED RELEASE ORAL at 16:23

## 2022-08-13 RX ADMIN — POTASSIUM CHLORIDE 40 MEQ: 1500 TABLET, EXTENDED RELEASE ORAL at 09:03

## 2022-08-13 RX ADMIN — GABAPENTIN 300 MG: 300 CAPSULE ORAL at 19:36

## 2022-08-13 RX ADMIN — ALCOHOL 1 TABLET: 70.47 GEL TOPICAL at 09:03

## 2022-08-13 RX ADMIN — BUSPIRONE HYDROCHLORIDE 5 MG: 5 TABLET ORAL at 19:36

## 2022-08-13 RX ADMIN — POTASSIUM CHLORIDE 10 MEQ: 7.46 INJECTION, SOLUTION INTRAVENOUS at 06:57

## 2022-08-13 RX ADMIN — ENOXAPARIN SODIUM 40 MG: 100 INJECTION SUBCUTANEOUS at 09:03

## 2022-08-13 RX ADMIN — MORPHINE SULFATE 15 MG: 15 TABLET, FILM COATED, EXTENDED RELEASE ORAL at 18:58

## 2022-08-13 RX ADMIN — SODIUM CHLORIDE: 9 INJECTION, SOLUTION INTRAVENOUS at 18:35

## 2022-08-13 RX ADMIN — SODIUM CHLORIDE, PRESERVATIVE FREE 5 ML: 5 INJECTION INTRAVENOUS at 19:35

## 2022-08-13 RX ADMIN — BUSPIRONE HYDROCHLORIDE 5 MG: 5 TABLET ORAL at 12:07

## 2022-08-13 RX ADMIN — GABAPENTIN 300 MG: 300 CAPSULE ORAL at 09:03

## 2022-08-13 RX ADMIN — CEFTRIAXONE SODIUM 1000 MG: 1 INJECTION, POWDER, FOR SOLUTION INTRAMUSCULAR; INTRAVENOUS at 16:17

## 2022-08-13 ASSESSMENT — PAIN SCALES - GENERAL
PAINLEVEL_OUTOF10: 3
PAINLEVEL_OUTOF10: 8
PAINLEVEL_OUTOF10: 0

## 2022-08-13 ASSESSMENT — PAIN DESCRIPTION - ORIENTATION: ORIENTATION: RIGHT

## 2022-08-13 ASSESSMENT — PAIN DESCRIPTION - LOCATION
LOCATION: HIP
LOCATION: LEG

## 2022-08-13 ASSESSMENT — PAIN DESCRIPTION - DESCRIPTORS
DESCRIPTORS: ACHING
DESCRIPTORS: ACHING

## 2022-08-13 NOTE — PROGRESS NOTES
Assessment: Patient was awake and alert when  visited. Family was not present at the time. However, patient confirmed she had strong family support. When asked how she was feeling, patient responded; \"better. \" Patient was open to prayer but not affiliated with any Christian tradition. Intervention:  provided presence, offered support and prayed with patient. Outcome: Patient expressed appreciation for the spiritual and emotional support she received. Plan: Follow up visits recommended for more prayers and support.

## 2022-08-13 NOTE — PLAN OF CARE
Problem: Discharge Planning  Goal: Discharge to home or other facility with appropriate resources  Outcome: Progressing     Problem: Safety - Adult  Goal: Free from fall injury  Outcome: Progressing     Problem: Pain  Goal: Verbalizes/displays adequate comfort level or baseline comfort level  Outcome: Progressing     Problem: ABCDS Injury Assessment  Goal: Absence of physical injury  Outcome: Progressing

## 2022-08-13 NOTE — PROGRESS NOTES
[facility]  Internal Medicine Teaching Residency Program  Inpatient Daily Progress Note  ______________________________________________________________________________    Patient: Adrián Garcia  YOB: 1955   MERCEDES:[id number]    Acct: [de-identified]     Room: [facility]  Admit date: 8/12/2022  Today's date: 08/13/22  Number of days in the hospital: 1    SUBJECTIVE   Admitting Diagnosis: UTI (urinary tract infection)  CC: UTI    - Pt examined at bedside. Chart & results reviewed. No acute events overnight. Potassium was 3.1 this a.m. was replaced. Plan for today:  - Continue ceftriaxone  - Continue IV fluids  - Follow-up on urine cultures pending  - Blood cultures: Preliminary results show no growth in both  - Replace potassium as needed  - Possible discharge    Review of Systems  Constitutional:  Positive for activity change and fatigue. Negative for chills and fever. HENT: Patient is no longer having a sore throat or difficulty swallowing. Eyes:  Negative for visual disturbance. Respiratory:  Negative for chest tightness and shortness of breath. Cardiovascular:  Negative for chest pain. Gastrointestinal:  Negative for abdominal distention, abdominal pain and constipation. Genitourinary: Patient is no longer having dysuria negative for frequency. BRIEF HISTORY     Adrián Garcia is a 74 y.o. female who presents with complaint of generalized fatigue as well as dysuria. Past medical history significant for chronic back pain, hypertension, hypothyroidism. According to patient she is felt fatigued for the past few weeks to months. Has been not feeling herself. According to daughter patient not been taking care of her self has not been taking her medications for the past week. Was recently seen at outlying facility approximately 1 month ago was diagnosed with UTI.   Patient was given Bactrim however patient did not take her medications as prescribed and is still having symptoms. According to daughter patient has not been eating or drinking for the past week and is not had a bowel movement is barely urinating. Patient lives at home alone with her dogs. Does have good family support according to daughter as well as patient. Granddaughter they have been fighting more than usual and patient has been much more agitated. Patient otherwise she states she feels generally fatigued and admits to sore throat as well as some dysuria but denies any abdominal pain, nausea, vomiting, diarrhea, chest pain, shortness of breath, slurred speech, one-sided weakness. OBJECTIVE     Vital Signs:  BP (!) 122/110   Pulse 83   Temp 98.1 °F (36.7 °C) (Oral)   Resp 16   Ht 5' 10\" (1.778 m)   Wt 199 lb (90.3 kg)   SpO2 100%   BMI 28.55 kg/m²     Temp (24hrs), Av.5 °F (36.4 °C), Min:97 °F (36.1 °C), Max:98.1 °F (36.7 °C)    In: 1050   Out: 250 [Urine:250]    Physical Exam:  Physical Exam  Constitutional:       General: She is not in acute distress. HENT:      Head: Normocephalic and atraumatic. Nose: Nose normal.      Mouth/Throat:      Mouth: Mucous membranes are moist.      Pharynx: Oropharynx is clear. Eyes:      Extraocular Movements: Extraocular movements intact. Conjunctiva/sclera: Conjunctivae normal.      Pupils: Pupils are equal, round, and reactive to light. Cardiovascular:      Rate and Rhythm: Normal rate and regular rhythm. Pulses: Normal pulses. Heart sounds: Normal heart sounds. Pulmonary:      Effort: Pulmonary effort is normal.      Breath sounds: Normal breath sounds. Abdominal:      General: Bowel sounds are normal.      Palpations: Abdomen is soft. Genitourinary:     Comments: Patient has no complaints this morning of dysuria. Musculoskeletal:         General: Normal range of motion. Skin:     General: Skin is warm and dry. Neurological:      General: No focal deficit present.       Mental Status: She is Problem:    UTI (urinary tract infection)  Active Problems:    Hypokalemia    Acquired hypothyroidism  Resolved Problems:    * No resolved hospital problems. *      IMPRESSION  This is a 74 y.o. female who presented with dysuria, sore throat, decreased appetite and not taking his medications and found to have UTI and hypokalemia. Patient admitted to inpatient status for antibiotics and further treatment. Principal Problem:    UTI (urinary tract infection)  -Ceftriaxone  - IV fluids  - Urine cultures pending  - Blood cultures: Preliminary result shows no growth in both       Hypokalemia  -Replace potassium as needed  - BMP/CMP    Hypothyroidism:  - TSH with reflex ordered  - Home dose Synthroid ordered     Sore throat:  - Possible swallow study in a.m. Chronic back pain:  - Patient is on 15 mg of extended release morphine sulfate twice daily at home  - Gabapentin  - Kat given overnight    Diet: Regular  DVT ppx : Lovenox  GI ppx: None    PT/OT/SW: Consulted  Discharge Planning: Patient wants discharged home and not to facility,  is consulted      Yanira Self M.D. Internal Medicine Resident PGY-1  [facility]. 2:48 AM 8/13/2022     Please note that part of this chart was generated using voice recognition dictation software. Although every effort was made to ensure the accuracy of this automated transcription, some errors in transcription may have occurred.

## 2022-08-14 VITALS
DIASTOLIC BLOOD PRESSURE: 69 MMHG | TEMPERATURE: 97.7 F | OXYGEN SATURATION: 93 % | HEIGHT: 70 IN | SYSTOLIC BLOOD PRESSURE: 122 MMHG | RESPIRATION RATE: 16 BRPM | BODY MASS INDEX: 28.49 KG/M2 | HEART RATE: 78 BPM | WEIGHT: 199 LBS

## 2022-08-14 LAB
ABSOLUTE EOS #: 0 K/UL (ref 0–0.44)
ABSOLUTE IMMATURE GRANULOCYTE: 0.09 K/UL (ref 0–0.3)
ABSOLUTE LYMPH #: 1.15 K/UL (ref 1.1–3.7)
ABSOLUTE MONO #: 0.19 K/UL (ref 0.1–1.2)
ALBUMIN SERPL-MCNC: 3.2 G/DL (ref 3.5–5.2)
ALBUMIN/GLOBULIN RATIO: 1.4 (ref 1–2.5)
ALP BLD-CCNC: 72 U/L (ref 35–104)
ALT SERPL-CCNC: 23 U/L (ref 5–33)
ANION GAP SERPL CALCULATED.3IONS-SCNC: 10 MMOL/L (ref 9–17)
AST SERPL-CCNC: 33 U/L
BASOPHILS # BLD: 0 % (ref 0–2)
BASOPHILS ABSOLUTE: 0 K/UL (ref 0–0.2)
BILIRUB SERPL-MCNC: 1.72 MG/DL (ref 0.3–1.2)
BILIRUBIN DIRECT: 0.62 MG/DL
BILIRUBIN, INDIRECT: 1.1 MG/DL (ref 0–1)
BUN BLDV-MCNC: 23 MG/DL (ref 8–23)
CALCIUM SERPL-MCNC: 8.8 MG/DL (ref 8.6–10.4)
CHLORIDE BLD-SCNC: 101 MMOL/L (ref 98–107)
CO2: 28 MMOL/L (ref 20–31)
CREAT SERPL-MCNC: 0.71 MG/DL (ref 0.5–0.9)
EOSINOPHILS RELATIVE PERCENT: 0 % (ref 1–4)
GFR AFRICAN AMERICAN: >60 ML/MIN
GFR NON-AFRICAN AMERICAN: >60 ML/MIN
GFR SERPL CREATININE-BSD FRML MDRD: ABNORMAL ML/MIN/{1.73_M2}
GLUCOSE BLD-MCNC: 76 MG/DL (ref 70–99)
HCT VFR BLD CALC: 27.6 % (ref 36.3–47.1)
HEMOGLOBIN: 10.3 G/DL (ref 11.9–15.1)
IMMATURE GRANULOCYTES: 3 %
LYMPHOCYTES # BLD: 37 % (ref 24–43)
MAGNESIUM: 1.9 MG/DL (ref 1.6–2.6)
MCH RBC QN AUTO: 41.4 PG (ref 25.2–33.5)
MCHC RBC AUTO-ENTMCNC: 37.3 G/DL (ref 28.4–34.8)
MCV RBC AUTO: 110.8 FL (ref 82.6–102.9)
MONOCYTES # BLD: 6 % (ref 3–12)
MORPHOLOGY: ABNORMAL
MORPHOLOGY: ABNORMAL
NRBC AUTOMATED: 0.7 PER 100 WBC
PDW BLD-RTO: 17 % (ref 11.8–14.4)
PLATELET # BLD: ABNORMAL K/UL (ref 138–453)
PLATELET, FLUORESCENCE: 37 K/UL (ref 138–453)
PLATELET, IMMATURE FRACTION: 6 % (ref 1.1–10.3)
POTASSIUM SERPL-SCNC: 3.3 MMOL/L (ref 3.7–5.3)
RBC # BLD: 2.49 M/UL (ref 3.95–5.11)
SEG NEUTROPHILS: 54 % (ref 36–65)
SEGMENTED NEUTROPHILS ABSOLUTE COUNT: 1.67 K/UL (ref 1.5–8.1)
SODIUM BLD-SCNC: 139 MMOL/L (ref 135–144)
TOTAL PROTEIN: 5.5 G/DL (ref 6.4–8.3)
WBC # BLD: 3.1 K/UL (ref 3.5–11.3)

## 2022-08-14 PROCEDURE — 6370000000 HC RX 637 (ALT 250 FOR IP)

## 2022-08-14 PROCEDURE — 2580000003 HC RX 258

## 2022-08-14 PROCEDURE — 80048 BASIC METABOLIC PNL TOTAL CA: CPT

## 2022-08-14 PROCEDURE — 83735 ASSAY OF MAGNESIUM: CPT

## 2022-08-14 PROCEDURE — 85025 COMPLETE CBC W/AUTO DIFF WBC: CPT

## 2022-08-14 PROCEDURE — 36415 COLL VENOUS BLD VENIPUNCTURE: CPT

## 2022-08-14 PROCEDURE — 99239 HOSP IP/OBS DSCHRG MGMT >30: CPT | Performed by: INTERNAL MEDICINE

## 2022-08-14 PROCEDURE — 85055 RETICULATED PLATELET ASSAY: CPT

## 2022-08-14 PROCEDURE — 80076 HEPATIC FUNCTION PANEL: CPT

## 2022-08-14 PROCEDURE — G0378 HOSPITAL OBSERVATION PER HR: HCPCS

## 2022-08-14 RX ORDER — CEPHALEXIN 500 MG/1
500 CAPSULE ORAL 4 TIMES DAILY
Qty: 20 CAPSULE | Refills: 0 | Status: SHIPPED | OUTPATIENT
Start: 2022-08-14 | End: 2022-08-19

## 2022-08-14 RX ORDER — POTASSIUM CHLORIDE 20 MEQ/1
40 TABLET, EXTENDED RELEASE ORAL ONCE
Status: COMPLETED | OUTPATIENT
Start: 2022-08-14 | End: 2022-08-14

## 2022-08-14 RX ORDER — ERGOCALCIFEROL 1.25 MG/1
50000 CAPSULE ORAL WEEKLY
Qty: 5 CAPSULE | Refills: 0 | Status: SHIPPED | OUTPATIENT
Start: 2022-08-19

## 2022-08-14 RX ADMIN — ALCOHOL 1 TABLET: 70.47 GEL TOPICAL at 10:23

## 2022-08-14 RX ADMIN — BUSPIRONE HYDROCHLORIDE 5 MG: 5 TABLET ORAL at 10:24

## 2022-08-14 RX ADMIN — SODIUM CHLORIDE, PRESERVATIVE FREE 10 ML: 5 INJECTION INTRAVENOUS at 10:00

## 2022-08-14 RX ADMIN — POTASSIUM CHLORIDE 40 MEQ: 1500 TABLET, EXTENDED RELEASE ORAL at 10:23

## 2022-08-14 RX ADMIN — LEVOTHYROXINE SODIUM 100 MCG: 100 TABLET ORAL at 04:58

## 2022-08-14 RX ADMIN — FOLIC ACID 1 MG: 1 TABLET ORAL at 10:23

## 2022-08-14 RX ADMIN — GABAPENTIN 300 MG: 300 CAPSULE ORAL at 10:23

## 2022-08-14 NOTE — DISCHARGE INSTRUCTIONS
You were admitted for UTI    Please start taking Keflex 500mg 4 times daily until 8/20/22  Please start taking Vitamin D 50,000U once weekly for 5 weeks   Please continue to take all other medications as prescribed   Please follow up with PCP in 1 week for post-admission follow up     Urinary Tract Infection (UTI) in Women: Care Instructions  Overview     A urinary tract infection, or UTI, is a general term for an infection anywhere between the kidneys and the urethra (where urine comes out). Most UTIs arebladder infections. They often cause pain or burning when you urinate. UTIs are caused by bacteria and can be cured with antibiotics. Be sure tocomplete your treatment so that the infection does not get worse. Follow-up care is a key part of your treatment and safety. Be sure to make and go to all appointments, and call your doctor if you are having problems. It's also a good idea to know your test results and keep alist of the medicines you take. How can you care for yourself at home? Take your antibiotics as directed. Do not stop taking them just because you feel better. You need to take the full course of antibiotics. Drink extra water and other fluids for the next day or two. This will help make the urine less concentrated and help wash out the bacteria that are causing the infection. (If you have kidney, heart, or liver disease and have to limit fluids, talk with your doctor before you increase the amount of fluids you drink.)  Avoid drinks that are carbonated or have caffeine. They can irritate the bladder. Urinate often. Try to empty your bladder each time. To relieve pain, take a hot bath or lay a heating pad set on low over your lower belly or genital area. Never go to sleep with a heating pad in place. To prevent UTIs  Drink plenty of water each day. This helps you urinate often, which clears bacteria from your system.  (If you have kidney, heart, or liver disease and have to limit fluids, talk with your doctor before you increase the amount of fluids you drink.)  Urinate when you need to. If you are sexually active, urinate right after you have sex. Change sanitary pads often. Avoid douches, bubble baths, feminine hygiene sprays, and other feminine hygiene products that have deodorants. After going to the bathroom, wipe from front to back. When should you call for help? Call your doctor now or seek immediate medical care if:    Symptoms such as fever, chills, nausea, or vomiting get worse or appear for the first time. You have new pain in your back just below your rib cage. This is called flank pain. There is new blood or pus in your urine. You have any problems with your antibiotic medicine. Watch closely for changes in your health, and be sure to contact your doctor if:    You are not getting better after taking an antibiotic for 2 days. Your symptoms go away but then come back. Where can you learn more? Go to [email or web address] [email or web address] and sign in to your [employer] account. Enter H802 in the [facility] box to learn more about \"Urinary Tract Infection (UTI) in Women: Care Instructions. \"     If you do not have an account, please click on the \"Sign Up Now\" link. Current as of: October 18, 2021               Content Version: 13.3  © 0788-6991 Healthwise, Incorporated. Care instructions adapted under license by [facility] ([facility]). If you have questions about a medical condition or this instruction, always ask your healthcare professional. [place] [address] any warranty or liability for your use of this information.

## 2022-08-14 NOTE — DISCHARGE INSTR - DIET

## 2022-08-14 NOTE — PROGRESS NOTES
[facility]  Internal Medicine Teaching Residency Program  Inpatient Daily Progress Note  ______________________________________________________________________________    Patient: Nils Yadav  YOB: 1955   SGV:[id number]    Acct: [de-identified]     Room: [facility]/0308-25  Admit date: 8/12/2022  Today's date: 08/14/22  Number of days in the hospital: 2    SUBJECTIVE   Admitting Diagnosis: UTI (urinary tract infection)  CC: UTI     - Pt examined at bedside. Chart & results reviewed. No acute events overnight. Potassium was 3.1 this a.m. was replaced. Plan for today:  - DC on Keflex   - Continue IV fluids  - urine cultures + for  Klebsiella   - Blood cultures: no growth in both  - Replace potassium as needed  -- DC     Review of Systems  Constitutional:  Negative for chills and fever. HENT: Patient is no longer having a sore throat or difficulty swallowing. Eyes:  Negative for visual disturbance. Respiratory:  Negative for chest tightness and shortness of breath. Cardiovascular:  Negative for chest pain. Gastrointestinal:  Negative for abdominal distention, abdominal pain and constipation. Genitourinary: Patient is no longer having dysuria negative for frequency. BRIEF HISTORY     Nils Yadav is a 74 y.o. female who presents with complaint of generalized fatigue as well as dysuria. Past medical history significant for chronic back pain, hypertension, hypothyroidism. According to patient she is felt fatigued for the past few weeks to months. Has been not feeling herself. According to daughter patient not been taking care of her self has not been taking her medications for the past week. Was recently seen at outlying facility approximately 1 month ago was diagnosed with UTI. Patient was given Bactrim however patient did not take her medications as prescribed and is still having symptoms.   According to daughter patient has not been eating or drinking for the past week and is not had a bowel movement is barely urinating. Patient lives at home alone with her dogs. Does have good family support according to daughter as well as patient. Granddaughter they have been fighting more than usual and patient has been much more agitated. Patient otherwise she states she feels generally fatigued and admits to sore throat as well as some dysuria but denies any abdominal pain, nausea, vomiting, diarrhea, chest pain, shortness of breath, slurred speech, one-sided weakness. DC today     OBJECTIVE     Vital Signs:  /69   Pulse 78   Temp 97.7 °F (36.5 °C) (Oral)   Resp 16   Ht 5' 10\" (1.778 m)   Wt 199 lb (90.3 kg)   SpO2 93%   BMI 28.55 kg/m²     Temp (24hrs), Av.1 °F (36.7 °C), Min:97.7 °F (36.5 °C), Max:98.4 °F (36.9 °C)    In: 800   Out: 540 [Urine:540]    Physical Exam  Constitutional:       General: She is not in acute distress. HENT:     Head: Normocephalic and atraumatic. Nose: Nose normal.     Mouth/Throat:     Mouth: Mucous membranes are moist.     Pharynx: Oropharynx is clear. Eyes:     Extraocular Movements: Extraocular movements intact. Conjunctiva/sclera: Conjunctivae normal.     Pupils: Pupils are equal, round, and reactive to light. Cardiovascular:     Rate and Rhythm: Normal rate and regular rhythm. Pulses: Normal pulses. Heart sounds: Normal heart sounds. Pulmonary:     Effort: Pulmonary effort is normal.     Breath sounds: Normal breath sounds. Abdominal:     General: Bowel sounds are normal.     Palpations: Abdomen is soft. Genitourinary:     Comments: Patient has no complaints this morning of dysuria or flank pain. Musculoskeletal:         General: Normal range of motion. Skin:     General: Skin is warm and dry. Neurological:     General: No focal deficit present. Mental Status: She is alert.      Medications:  Scheduled Medications:    busPIRone  5 mg Oral TID    [Held by provider] atenolol  25 mg Oral Daily    gabapentin  300 mg Oral BID    levothyroxine  100 mcg Oral Daily    sodium chloride flush  5-40 mL IntraVENous 2 times per day    enoxaparin  40 mg SubCUTAneous Daily    vitamin D  50,000 Units Oral Weekly    folic acid  1 mg Oral Daily    multivitamin  1 tablet Oral Daily    cefTRIAXone (ROCEPHIN) IV  1,000 mg IntraVENous Q24H     Continuous Infusions:    sodium chloride      sodium chloride 50 mL/hr at 08/13/22 1835     PRN Medicationssodium chloride flush, 5-40 mL, PRN  sodium chloride, , PRN  ondansetron, 4 mg, Q8H PRN   Or  ondansetron, 4 mg, Q6H PRN  polyethylene glycol, 17 g, Daily PRN  acetaminophen, 650 mg, Q6H PRN   Or  acetaminophen, 650 mg, Q6H PRN        Diagnostic Labs:  CBC:   Recent Labs     08/12/22  1250 08/13/22  0356 08/14/22 0432   WBC 5.1 3.8 3.1*   RBC 3.38* 2.91* 2.49*   HGB 13.7 12.0 10.3*   HCT 39.0 32.9* 27.6*   .4* 113.1* 110.8*   RDW 17.3* 17.2* 17.0*   PLT 68* See Reflexed IPF Result See Reflexed IPF Result     BMP:   Recent Labs     08/12/22  1902 08/13/22  0356 08/14/22 0432    137 139   K 3.3* 3.1* 3.3*   CL 94* 95* 101   CO2 27 29 28   BUN 27* 25* 23   CREATININE 0.74 0.73 0.71     BNP: No results for input(s): BNP in the last 72 hours. PT/INR: No results for input(s): PROTIME, INR in the last 72 hours. APTT: No results for input(s): APTT in the last 72 hours. CARDIAC ENZYMES: No results for input(s): CKMB, CKMBINDEX, TROPONINI in the last 72 hours.     Invalid input(s): CKTOTAL;3  FASTING LIPID PANEL:  Lab Results   Component Value Date    CHOL 98 08/12/2022    HDL 31 (L) 08/12/2022    TRIG 102 08/12/2022     LIVER PROFILE:   Recent Labs     08/12/22  1250 08/12/22 1902 08/14/22  0432   AST 40* 36* 33*   ALT 28 25 23   BILIDIR  --   --  0.62*   BILITOT 4.38* 3.36* 1.72*   ALKPHOS 95 85 72      MICROBIOLOGY:   Lab Results   Component Value Date/Time    CULTURE NO GROWTH 1 DAY 08/12/2022 07:02 PM    CULTURE NO GROWTH 1 DAY 08/12/2022 07:02 PM Imaging:    XR CHEST (2 VW)    Result Date: 8/12/2022  No acute cardiopulmonary disease       ASSESSMENT & PLAN     Assessment and Plan:    Principal Problem:    UTI (urinary tract infection)  Active Problems:    Hypokalemia    Acquired hypothyroidism  Resolved Problems:    * No resolved hospital problems. *    IMPRESSION  This is a 74 y.o. female who presented with dysuria, sore throat, decreased appetite and not taking his medications and found to have UTI and hypokalemia. Patient admitted to inpatient status for antibiotics and further treatment. Principal Problem:    UTI (urinary tract infection)  - DC on Keflex   - IV fluids  - Urine cultures + for Klebsiella   - Blood cultures: no growth in both       Hypokalemia  -Replace potassium as needed  - BMP/CMP    Hypothyroidism:  - TSH with reflex ordered  - Home dose Synthroid ordered       Diet: Regular  DVT ppx : Lovenox  GI ppx: None     PT/OT/SW: Consulted  Discharge Planning: DC to home        Marshall Snellen, M.D. Internal Medicine Resident PGY-1  [facility].    12:20 PM 8/14/2022     Please note that part of this chart was generated using voice recognition dictation software. Although every effort was made to ensure the accuracy of this automated transcription, some errors in transcription may have occurred.

## 2022-08-14 NOTE — PLAN OF CARE
Problem: Discharge Planning  Goal: Discharge to home or other facility with appropriate resources  8/13/2022 1551 by Santos Kumari RN  Outcome: Progressing     Problem: Safety - Adult  Goal: Free from fall injury  8/14/2022 0303 by Fausto Hyatt RN  Outcome: Progressing  8/13/2022 1551 by Santos Kumari RN  Outcome: Progressing     Problem: Pain  Goal: Verbalizes/displays adequate comfort level or baseline comfort level  8/14/2022 0303 by Fausto Hyatt RN  Outcome: Progressing  8/13/2022 1551 by Santos Kumari RN  Outcome: Progressing     Problem: ABCDS Injury Assessment  Goal: Absence of physical injury  8/13/2022 1551 by Santos Kumari RN  Outcome: Progressing

## 2022-08-15 NOTE — PROGRESS NOTES
Reviewed patient's urine culture - culture positive for klebsiella. Patient was discharged on cephalexin 500 mg PO QID x 5 days, and culture is sensitive to prescribed medication. Antibiotic prescribed at discharge is appropriate - no changes made to antibiotic regimen.      Thank you,   Cira Riojas PharmD Candidate [id number]

## 2022-08-16 LAB
ESTIMATED AVERAGE GLUCOSE: 85 MG/DL
HBA1C MFR BLD: 4.6 % (ref 4–6)

## 2022-08-17 LAB
CULTURE: NORMAL
CULTURE: NORMAL
Lab: NORMAL
Lab: NORMAL
SPECIMEN DESCRIPTION: NORMAL
SPECIMEN DESCRIPTION: NORMAL

## 2022-08-17 NOTE — DISCHARGE SUMMARY
[facility]     Department of Internal Medicine - Staff Internal Medicine Teaching Service    INPATIENT DISCHARGE SUMMARY      Patient Identification:  Amado Park is a 74 y.o. female. :  1955  MRN: [id number]     Acct: [de-identified]   PCP: MARIA M Anders CNP  Admit Date:  2022  Discharge date and time: 2022  2:34 PM   Attending Provider: No att. providers found                                     [address] Problem Lists:  Principal Problem:    UTI (urinary tract infection)  Active Problems:    Hypokalemia    Acquired hypothyroidism  Resolved Problems:    * No resolved hospital problems. [facility] STAY     Brief Inpatient course:   Amado Park is a 74 y.o. female who presents with complaint of generalized fatigue as well as dysuria. Past medical history significant for chronic back pain, hypertension, hypothyroidism. According to patient she is felt fatigued for the past few weeks to months. Has been not feeling herself. According to daughter patient not been taking care of her self has not been taking her medications for the past week. Was recently seen at outlying facility approximately 1 month ago was diagnosed with UTI. Patient was given Bactrim however patient did not take her medications as prescribed and is still having symptoms. According to daughter patient has not been eating or drinking for the past week and is not had a bowel movement is barely urinating. Patient lives at home alone with her dogs. Does have good family support according to daughter as well as patient. Granddaughter they have been fighting more than usual and patient has been much more agitated.   Patient otherwise she states she feels generally fatigued and admits to sore throat as well as some dysuria but denies any abdominal pain, nausea, vomiting, diarrhea, chest pain, shortness of breath, slurred speech, one-sided weakness. During patient's short hospital stay she was treated for UTI. Urine cultures came back positive for Klebsiella pneumonia. Blood culture showed no growth. Patient was discharged on Keflex    Procedures/ Significant Interventions:    None    Consults:     Consults:     Final Specialist Recommendations/Findings:   IP CONSULT TO INTERNAL MEDICINE  IP CONSULT TO CASE MANAGEMENT      Any Hospital Acquired Infections: none    Discharge Functional Status:  stable    DISCHARGE PLAN     Disposition: home    Patient Instructions:   Discharge Medication List as of 2022 11:54 AM        START taking these medications    Details   Ergocalciferol (VITAMIN D) 17976 units CAPS Take 50,000 Units by mouth once a week, Disp-5 capsule, R-0Normal      cephALEXin (KEFLEX) 500 MG capsule Take 1 capsule by mouth in the morning and 1 capsule at noon and 1 capsule in the evening and 1 capsule before bedtime. Do all this for 5 days. , Disp-20 capsule, R-0Normal           CONTINUE these medications which have NOT CHANGED    Details   busPIRone (BUSPAR) 10 MG tablet Si-2 tablets by mouth TID prn., Disp-180 tablet, R-0Normal      gabapentin (NEURONTIN) 300 MG capsule Take 300 mg by mouth in the morning and 300 mg before bedtime. Historical Med      morphine (MSIR) 15 MG tablet Take 15 mg by mouth in the morning and 15 mg before bedtime. Historical Med      levothyroxine (SYNTHROID) 100 MCG tablet Take 1 tablet by mouth in the morning., Disp-90 tablet, R-1Adjust Sig      meloxicam (MOBIC) 7.5 MG tablet Take 1 tablet by mouth daily as needed for Pain, Disp-30 tablet, R-0Adjust Sig      atenolol (TENORMIN) 25 MG tablet Take 25 mg by mouth dailyHistorical Med      Docusate Calcium (STOOL SOFTENER PO) Take 2 capsules by mouth as needed. Historical Med             Activity: activity as tolerated    Diet: regular diet    Follow-up:    Radha Robison, MARIA M - CNP  [address]   [address]  [address] [facility] [address]  [phone]    Schedule an appointment as soon as possible for a visit in 1 week(s)  Post-admission follow up    [facility]  [address]  [phone]  Follow up  As needed, If symptoms worsen      Patient Instructions: You were admitted for UTI    Please start taking Keflex 500mg 4 times daily until 8/20/22  Please start taking Vitamin D 50,000U once weekly for 5 weeks   Please continue to take all other medications as prescribed   Please follow up with PCP in 1 week for post-admission follow up     Urinary Tract Infection (UTI) in Women: Care Instructions  Overview     A urinary tract infection, or UTI, is a general term for an infection anywhere between the kidneys and the urethra (where urine comes out). Most UTIs arebladder infections. They often cause pain or burning when you urinate. UTIs are caused by bacteria and can be cured with antibiotics. Be sure tocomplete your treatment so that the infection does not get worse. Follow-up care is a key part of your treatment and safety. Be sure to make and go to all appointments, and call your doctor if you are having problems. It's also a good idea to know your test results and keep alist of the medicines you take. How can you care for yourself at home? Take your antibiotics as directed. Do not stop taking them just because you feel better. You need to take the full course of antibiotics. Drink extra water and other fluids for the next day or two. This will help make the urine less concentrated and help wash out the bacteria that are causing the infection. (If you have kidney, heart, or liver disease and have to limit fluids, talk with your doctor before you increase the amount of fluids you drink.)  Avoid drinks that are carbonated or have caffeine. They can irritate the bladder. Urinate often. Try to empty your bladder each time.   To relieve pain, take a hot bath or lay a heating pad set on low over your lower belly or genital area. Never go to sleep with a heating pad in place. To prevent UTIs  Drink plenty of water each day. This helps you urinate often, which clears bacteria from your system. (If you have kidney, heart, or liver disease and have to limit fluids, talk with your doctor before you increase the amount of fluids you drink.)  Urinate when you need to. If you are sexually active, urinate right after you have sex. Change sanitary pads often. Avoid douches, bubble baths, feminine hygiene sprays, and other feminine hygiene products that have deodorants. After going to the bathroom, wipe from front to back. When should you call for help? Call your doctor now or seek immediate medical care if:    Symptoms such as fever, chills, nausea, or vomiting get worse or appear for the first time. You have new pain in your back just below your rib cage. This is called flank pain. There is new blood or pus in your urine. You have any problems with your antibiotic medicine. Watch closely for changes in your health, and be sure to contact your doctor if:    You are not getting better after taking an antibiotic for 2 days. Your symptoms go away but then come back. Where can you learn more? Go to [email or web address] [email or web address] and sign in to your [employer] account. Enter X475 in the [facility] box to learn more about \"Urinary Tract Infection (UTI) in Women: Care Instructions. \"     If you do not have an account, please click on the \"Sign Up Now\" link. Current as of: October 18, 2021               Content Version: 13.3  © 2222-8971 Healthwise, Incorporated. Care instructions adapted under license by [facility] ([facility]). If you have questions about a medical condition or this instruction, always ask your healthcare professional. [place] [address] any warranty or liability for your use of this information.     Note that over 30 minutes was spent in preparing discharge papers, discussing discharge with patient, medication review, etc.      Jas Theodore MD, MD  Internal Medicine Resident, PGY-1  [address], [place]  8/17/2022, 2:56 PM

## 2022-08-19 ENCOUNTER — HOSPITAL ENCOUNTER (OUTPATIENT)
Age: 67
Setting detail: SPECIMEN
Discharge: HOME | End: 2022-08-19

## 2022-08-19 DIAGNOSIS — E87.6 HYPOKALEMIA: ICD-10-CM

## 2022-08-19 DIAGNOSIS — N28.9 RENAL INSUFFICIENCY: ICD-10-CM

## 2022-08-19 LAB
ANION GAP SERPL CALCULATED.3IONS-SCNC: 10 MMOL/L (ref 9–17)
BUN BLDV-MCNC: 5 MG/DL (ref 8–23)
CALCIUM SERPL-MCNC: 9 MG/DL (ref 8.6–10.4)
CHLORIDE BLD-SCNC: 99 MMOL/L (ref 98–107)
CO2: 32 MMOL/L (ref 20–31)
CREAT SERPL-MCNC: 0.78 MG/DL (ref 0.5–0.9)
GFR AFRICAN AMERICAN: >60 ML/MIN
GFR NON-AFRICAN AMERICAN: >60 ML/MIN
GFR SERPL CREATININE-BSD FRML MDRD: ABNORMAL ML/MIN/{1.73_M2}
GLUCOSE BLD-MCNC: 110 MG/DL (ref 70–99)
POTASSIUM SERPL-SCNC: 4.6 MMOL/L (ref 3.7–5.3)
SODIUM BLD-SCNC: 141 MMOL/L (ref 135–144)

## 2022-08-31 ENCOUNTER — HOSPITAL ENCOUNTER (OUTPATIENT)
Age: 67
Setting detail: SPECIMEN
Discharge: HOME | End: 2022-08-31

## 2022-08-31 DIAGNOSIS — N30.01 ACUTE CYSTITIS WITH HEMATURIA: ICD-10-CM

## 2022-09-01 ENCOUNTER — HOSPITAL ENCOUNTER (OUTPATIENT)
Age: 67
Setting detail: SPECIMEN
Discharge: HOME | End: 2022-09-01

## 2022-09-01 LAB
BILIRUBIN URINE: NEGATIVE
CASTS UA: NORMAL /LPF (ref 0–8)
COLOR: YELLOW
EPITHELIAL CELLS UA: NORMAL /HPF (ref 0–5)
GLUCOSE URINE: NEGATIVE
KETONES, URINE: NEGATIVE
LEUKOCYTE ESTERASE, URINE: ABNORMAL
NITRITE, URINE: NEGATIVE
PH UA: 7.5 (ref 5–8)
PROTEIN UA: NEGATIVE
RBC UA: NORMAL /HPF (ref 0–4)
SPECIFIC GRAVITY UA: 1.01 (ref 1–1.03)
TURBIDITY: CLEAR
URINE HGB: NEGATIVE
UROBILINOGEN, URINE: NORMAL
WBC UA: NORMAL /HPF (ref 0–5)

## 2022-09-11 PROBLEM — N39.0 UTI (URINARY TRACT INFECTION): Status: RESOLVED | Noted: 2022-08-12 | Resolved: 2022-09-11

## 2022-09-28 ENCOUNTER — NURSE TRIAGE (OUTPATIENT)
Dept: OTHER | Facility: CLINIC | Age: 67
End: 2022-09-28

## 2022-09-28 NOTE — TELEPHONE ENCOUNTER
Received call from [place] at [facility] with [employer]. Subjective: Caller states \"really sick, chills, shaking and tingling in both legs and feet. \"     Current Symptoms: tingling in legs and feet, chills; hx of chronic back pain    Onset: 1 days ago; worsening    Associated Symptoms: NA    Pain Severity: 7/10; tingling; none    Temperature:   Denies Fever    What has been tried: Nothing     LMP: NA Pregnant: NA    Recommended disposition: See PCP within 3 Days    Care advice provided, patient verbalizes understanding; denies any other questions or concerns; instructed to call back for any new or worsening symptoms. Patient/Caller agrees with recommended disposition; writer provided warm transfer to [facility] at [facility] for appointment scheduling    Attention Provider: Thank you for allowing me to participate in the care of your patient. The patient was connected to triage in response to information provided to the ECC/PSC. Please do not respond through this encounter as the response is not directed to a shared pool.           Reason for Disposition   Numbness or tingling on both sides of body and is a new symptom lasting > 24 hours    Protocols used: Neurologic Deficit-ADULT-[place]

## 2022-10-03 ENCOUNTER — NURSE TRIAGE (OUTPATIENT)
Dept: OTHER | Facility: CLINIC | Age: 67
End: 2022-10-03

## 2022-10-03 NOTE — TELEPHONE ENCOUNTER
Received call from [place] at [facility] with [employer]. Subjective: Caller states \"Knee swelling\"     Current Symptoms:   Right knee pain and swelling after tripping over a dog and falling in the kitchen over some chairs and onto the floor. +Mild swelling that worsens throughout the day. +Redness  Patient is having to walk with a walker    Onset: 2 weeks ago; intermittent    Pain Severity: 7/10; sharp, tingling; intermittent    Temperature: Denies    What has been tried: Elevation of the feet, Ice and heat, Ace bandage    Recommended disposition: See PCP within 3 Days    Care advice provided, patient verbalizes understanding; denies any other questions or concerns; instructed to call back for any new or worsening symptoms. Patient/Caller agrees with recommended disposition; writer provided warm transfer to [place] at [facility] for appointment scheduling    Attention Provider: Thank you for allowing me to participate in the care of your patient. The patient was connected to triage in response to information provided to the ECC/PSC. Please do not respond through this encounter as the response is not directed to a shared pool.     Reason for Disposition   MODERATE pain (e.g., symptoms interfere with work or school, limping) and present > 3 days    Protocols used: Knee Pain-ADULT-[place]

## 2022-10-04 ENCOUNTER — HOSPITAL ENCOUNTER (OUTPATIENT)
Facility: CLINIC | Age: 67
Discharge: HOME | End: 2022-10-06
Payer: MEDICARE

## 2022-10-04 ENCOUNTER — HOSPITAL ENCOUNTER (OUTPATIENT)
Dept: GENERAL RADIOLOGY | Facility: CLINIC | Age: 67
Discharge: HOME | End: 2022-10-06
Payer: MEDICARE

## 2022-10-04 DIAGNOSIS — M79.605 PAIN IN BOTH LOWER EXTREMITIES: ICD-10-CM

## 2022-10-04 DIAGNOSIS — M25.561 CHRONIC PAIN OF BOTH KNEES: ICD-10-CM

## 2022-10-04 DIAGNOSIS — M25.562 CHRONIC PAIN OF BOTH KNEES: ICD-10-CM

## 2022-10-04 DIAGNOSIS — G89.29 CHRONIC PAIN OF BOTH KNEES: ICD-10-CM

## 2022-10-04 DIAGNOSIS — M79.604 PAIN IN BOTH LOWER EXTREMITIES: ICD-10-CM

## 2022-10-04 PROBLEM — E87.6 HYPOKALEMIA: Status: RESOLVED | Noted: 2022-08-12 | Resolved: 2022-10-04

## 2022-10-04 PROBLEM — M15.0 PRIMARY OSTEOARTHRITIS INVOLVING MULTIPLE JOINTS: Status: RESOLVED | Noted: 2017-06-26 | Resolved: 2022-10-04

## 2022-10-04 PROBLEM — Z99.81 OXYGEN DEPENDENT: Status: RESOLVED | Noted: 2022-07-19 | Resolved: 2022-10-04

## 2022-10-04 PROBLEM — M15.9 PRIMARY OSTEOARTHRITIS INVOLVING MULTIPLE JOINTS: Status: RESOLVED | Noted: 2017-06-26 | Resolved: 2022-10-04

## 2022-10-04 PROCEDURE — 73502 X-RAY EXAM HIP UNI 2-3 VIEWS: CPT

## 2022-10-04 PROCEDURE — 73562 X-RAY EXAM OF KNEE 3: CPT

## 2022-10-10 ENCOUNTER — HOSPITAL ENCOUNTER (EMERGENCY)
Age: 67
Discharge: HOME | End: 2022-10-10
Attending: EMERGENCY MEDICINE
Payer: MEDICARE

## 2022-10-10 ENCOUNTER — APPOINTMENT (OUTPATIENT)
Dept: GENERAL RADIOLOGY | Age: 67
End: 2022-10-10
Payer: MEDICARE

## 2022-10-10 VITALS
RESPIRATION RATE: 17 BRPM | SYSTOLIC BLOOD PRESSURE: 127 MMHG | HEIGHT: 70 IN | OXYGEN SATURATION: 97 % | DIASTOLIC BLOOD PRESSURE: 76 MMHG | WEIGHT: 207 LBS | TEMPERATURE: 97.7 F | HEART RATE: 81 BPM | BODY MASS INDEX: 29.63 KG/M2

## 2022-10-10 DIAGNOSIS — S99.912A INJURY OF LEFT ANKLE, INITIAL ENCOUNTER: Primary | ICD-10-CM

## 2022-10-10 PROCEDURE — 73630 X-RAY EXAM OF FOOT: CPT

## 2022-10-10 PROCEDURE — 99283 EMERGENCY DEPT VISIT LOW MDM: CPT

## 2022-10-10 PROCEDURE — 73610 X-RAY EXAM OF ANKLE: CPT

## 2022-10-10 ASSESSMENT — PAIN DESCRIPTION - PAIN TYPE: TYPE: ACUTE PAIN

## 2022-10-10 ASSESSMENT — ENCOUNTER SYMPTOMS
EYE PAIN: 0
COLOR CHANGE: 0
ABDOMINAL PAIN: 0
SHORTNESS OF BREATH: 0
BACK PAIN: 0

## 2022-10-10 ASSESSMENT — PAIN DESCRIPTION - LOCATION: LOCATION: ANKLE

## 2022-10-10 ASSESSMENT — PAIN DESCRIPTION - DESCRIPTORS: DESCRIPTORS: SHARP

## 2022-10-10 ASSESSMENT — PAIN SCALES - GENERAL: PAINLEVEL_OUTOF10: 8

## 2022-10-10 ASSESSMENT — PAIN - FUNCTIONAL ASSESSMENT
PAIN_FUNCTIONAL_ASSESSMENT: PREVENTS OR INTERFERES SOME ACTIVE ACTIVITIES AND ADLS
PAIN_FUNCTIONAL_ASSESSMENT: 0-10

## 2022-10-10 ASSESSMENT — PAIN DESCRIPTION - ORIENTATION: ORIENTATION: LEFT

## 2022-10-10 NOTE — ED PROVIDER NOTES
EMERGENCY DEPARTMENT ENCOUNTER    Pt Name: Faustina Martínez  MRN: [id number]  [place] 1955  Date of evaluation: 10/10/22  CHIEF COMPLAINT       Chief Complaint   Patient presents with    Leg Pain     L foot and ankle. Feel over dog this a.m. HISTORY OF PRESENT ILLNESS   19-year-old female presents for complaints of left ankle and foot pain. Patient reports that she was walking this morning tripped over the dog and fell forward onto her knees, states that she rolled her left foot up underneath her, reports that she was able to get up but she been having pain with bearing weight on the left foot and ankle since that time. Denies any knee pain, denies hitting her head or loss of consciousness, denies any neck or back pain, denies any wrist pain, denies any new numbness tingling weakness to the extremities, denies any chest pain, shortness of breath dizziness or lightheadedness. The history is provided by the patient. REVIEW OF SYSTEMS     Review of Systems   Constitutional:  Negative for fever. HENT:  Negative for congestion and ear pain. Eyes:  Negative for pain. Respiratory:  Negative for shortness of breath. Cardiovascular:  Negative for chest pain, palpitations and leg swelling. Gastrointestinal:  Negative for abdominal pain. Genitourinary:  Negative for dysuria and flank pain. Musculoskeletal:  Negative for back pain. Left foot and ankle pain   Skin:  Negative for color change. Neurological:  Negative for numbness and headaches. Psychiatric/Behavioral:  Negative for confusion. All other systems reviewed and are negative.   PASTMEDICAL HISTORY     Past Medical History:   Diagnosis Date    Chronic back pain     S/P L4/L5 OR    Hypertension     Hypothyroidism      Past Problem List  Patient Active Problem List   Diagnosis Code    Chronic back pain M54.9, G89.29    Varicose veins of legs I83.93    Renal insufficiency N28.9    Acquired hypothyroidism E03.9    Status post cholecystectomy Z90.49    History of bilateral hip replacements Z96.643    Shortness of breath on exertion R06.02    Vitamin D deficiency E55.9    Pulmonary nodule R91.1     SURGICAL HISTORY       Past Surgical History:   Procedure Laterality Date    CHOLECYSTECTOMY      HIP FRACTURE SURGERY Right 2016    closed reduction percutaneous pinning    HYSTERECTOMY (CERVIX STATUS UNKNOWN)      SPINE SURGERY      L4/L5     CURRENT MEDICATIONS       Discharge Medication List as of 10/10/2022 10:31 AM        CONTINUE these medications which have NOT CHANGED    Details   busPIRone (BUSPAR) 10 MG tablet TAKE 1 TO 2 TABLETS BY MOUTH THREE TIMES DAILY AS NEEDED, Disp-180 tablet, R-0Normal      levothyroxine (SYNTHROID) 100 MCG tablet Take 1 tablet by mouth daily, Disp-90 tablet, R-1Normal      Melatonin 10 MG TABS Take 1 tablet by mouth nightly, Disp-30 tablet, R-5Normal      Ergocalciferol (VITAMIN D) 24004 units CAPS Take 50,000 Units by mouth once a week, Disp-5 capsule, R-0Normal      gabapentin (NEURONTIN) 300 MG capsule Take 300 mg by mouth in the morning and 300 mg before bedtime. Historical Med      morphine (MSIR) 15 MG tablet Take 15 mg by mouth in the morning and 15 mg before bedtime. Historical Med      meloxicam (MOBIC) 7.5 MG tablet Take 1 tablet by mouth daily as needed for Pain, Disp-30 tablet, R-0Adjust Sig      atenolol (TENORMIN) 25 MG tablet Take 25 mg by mouth dailyHistorical Med      Docusate Calcium (STOOL SOFTENER PO) Take 2 capsules by mouth as needed. Historical Med           ALLERGIES     has No Known Allergies. FAMILY HISTORY     She indicated that her mother is . She indicated that her father is . She indicated that the status of her sister is unknown. She indicated that the status of her paternal aunt is unknown.      SOCIAL HISTORY       Social History     Tobacco Use    Smoking status: Never    Smokeless tobacco: Never   Vaping Use    Vaping Use: Never used   Substance Use Topics    Alcohol use: No    Drug use: No     PHYSICAL EXAM     INITIAL VITALS: /76   Pulse 81   Temp 97.7 °F (36.5 °C) (Oral)   Resp 17   Ht 5' 10\" (1.778 m)   Wt 207 lb (93.9 kg)   SpO2 97%   BMI 29.70 kg/m²    Physical Exam  Vitals and nursing note reviewed. Constitutional:       General: She is not in acute distress. Appearance: Normal appearance. She is not toxic-appearing. HENT:      Head: Normocephalic and atraumatic. Nose: Nose normal.      Mouth/Throat:      Mouth: Mucous membranes are moist.      Pharynx: Oropharynx is clear. Eyes:      Extraocular Movements: Extraocular movements intact. Conjunctiva/sclera: Conjunctivae normal.      Pupils: Pupils are equal, round, and reactive to light. Cardiovascular:      Rate and Rhythm: Normal rate and regular rhythm. Pulses: Normal pulses. Dorsalis pedis pulses are 2+ on the right side and 2+ on the left side. Heart sounds: Normal heart sounds. Pulmonary:      Effort: Pulmonary effort is normal.      Breath sounds: Normal breath sounds. Abdominal:      General: Bowel sounds are normal. There is no distension. Palpations: Abdomen is soft. Tenderness: There is no abdominal tenderness. Musculoskeletal:         General: Normal range of motion. Cervical back: Normal range of motion. No spinous process tenderness or muscular tenderness. Left ankle: No swelling or deformity. Tenderness present over the medial malleolus and base of 5th metatarsal. No lateral malleolus or proximal fibula tenderness. Anterior drawer test negative. Normal pulse. Left Achilles Tendon: Normal.      Left foot: Normal capillary refill. Tenderness present. Normal pulse. Skin:     General: Skin is warm and dry. Capillary Refill: Capillary refill takes less than 2 seconds. Neurological:      General: No focal deficit present. Mental Status: She is alert and oriented to person, place, and time. Cranial Nerves: Cranial nerves 2-12 are intact. Sensory: Sensation is intact. Motor: Motor function is intact. Psychiatric:         Mood and Affect: Mood normal.         Thought Content: Thought content does not include homicidal or suicidal ideation. MEDICAL DECISION MAKIN-year-old female presents for left foot and ankle injury. On initial exam patient in no acute distress, vitals are stable, noted have some tenderness in the medial malleolus as well as the dorsum of the left foot as well as base of the fifth metatarsal, will check x-ray to if any signs of bony injury, denying any other injury or pain at this time. X-ray was negative for bony injury    Suspect likely strain or sprain    Patient was reevaluated, results were discussed with patient, patient was offered air splint which she states that she has at home was also offered crutches which she reports she has a walker at home and does not want either    Discussed with patient need for follow-up with PCP, orthopedic surgery and return precautions, patient voiced understanding comfortable plan and discharge    Patient/Guardian was informed of their diagnosis and told to follow up with PCP & orthopedic surgery in 1-3 days. Patient demonstrates understanding and agreement with the plan. They were given the opportunity to ask questions and those questions were answered to the best of our ability with the available information. Patient/Guardian told to return to the ED for any new, worsening, changing or persistent symptoms. This dictation was prepared using [employer] voice recognition software.           CRITICAL CARE:       PROCEDURES:    Procedures    DIAGNOSTIC RESULTS   EKG:All EKG's are interpreted by the Emergency Department Physician who either signs or Co-signs this chart in the absence of a cardiologist.        RADIOLOGY:All plain film, CT, MRI, and formal ultrasound images (except ED bedside ultrasound) are read by the radiologist, see reports below, unless otherwisenoted in MDM or here. XR ANKLE LEFT (MIN 3 VIEWS)   Final Result   1. No acute osseous abnormality identified in the ankle. 2.  No acute osseous abnormality identified in the foot. XR FOOT LEFT (MIN 3 VIEWS)   Final Result   1. No acute osseous abnormality identified in the ankle. 2.  No acute osseous abnormality identified in the foot. LABS: All lab results were reviewed by myself, and all abnormals are listed below. Labs Reviewed - No data to display    EMERGENCY DEPARTMENTCOURSE:         Vitals:    Vitals:    10/10/22 0841   BP: 127/76   Pulse: 81   Resp: 17   Temp: 97.7 °F (36.5 °C)   TempSrc: Oral   SpO2: 97%   Weight: 207 lb (93.9 kg)   Height: 5' 10\" (1.778 m)       The patient was given the following medications while in the emergency department:  No orders of the defined types were placed in this encounter. CONSULTS:  None    FINAL IMPRESSION      1. Injury of left ankle, initial encounter          DISPOSITION/PLAN   DISPOSITION Decision To Discharge 10/10/2022 09:53:45 AM      PATIENT REFERRED TO:  Fleta Ginette, APRN - CNP  [address]  [address] [facility],[address]  [address] [facility]  [phone]    Schedule an appointment as soon as possible for a visit       [facility] ED  [address]  [address] [facility]  [phone]    As needed, If symptoms worsen    Brendan Galan [address] [address] [phone]    Schedule an appointment as soon as possible for a visit     DISCHARGE MEDICATIONS:  Discharge Medication List as of 10/10/2022 10:31 AM        The care is provided during an unprecedented national emergency due to the novel coronavirus, COVID 19.   [address], DO                     [address], DO  10/10/22 2898

## 2022-10-10 NOTE — ED NOTES
Pt states has crutches and and air cast at home. Dr Vladimir Barthel informed.       Andriy Diaz, FRANKO  10/10/22 7520

## 2022-10-13 NOTE — PROGRESS NOTES
Spoke with patient. She does not want to schedule an appt. She does not feel it is necessary. She will give our office a call if she continues to experience pain or changes her mind.  Cml

## 2022-12-15 ENCOUNTER — APPOINTMENT (OUTPATIENT)
Dept: GENERAL RADIOLOGY | Age: 67
End: 2022-12-15
Payer: MEDICARE

## 2022-12-15 ENCOUNTER — HOSPITAL ENCOUNTER (OUTPATIENT)
Age: 67
Setting detail: OBSERVATION
Discharge: HOME | End: 2022-12-17
Attending: STUDENT IN AN ORGANIZED HEALTH CARE EDUCATION/TRAINING PROGRAM | Admitting: FAMILY MEDICINE
Payer: MEDICARE

## 2022-12-15 ENCOUNTER — NURSE TRIAGE (OUTPATIENT)
Dept: OTHER | Facility: CLINIC | Age: 67
End: 2022-12-15

## 2022-12-15 ENCOUNTER — APPOINTMENT (OUTPATIENT)
Dept: CT IMAGING | Age: 67
End: 2022-12-15
Payer: MEDICARE

## 2022-12-15 ENCOUNTER — HOSPITAL ENCOUNTER (OUTPATIENT)
Age: 67
Setting detail: OBSERVATION
Discharge: HOME | End: 2022-12-15
Attending: EMERGENCY MEDICINE | Admitting: EMERGENCY MEDICINE
Payer: MEDICARE

## 2022-12-15 VITALS
BODY MASS INDEX: 22.24 KG/M2 | SYSTOLIC BLOOD PRESSURE: 138 MMHG | WEIGHT: 155 LBS | HEART RATE: 74 BPM | DIASTOLIC BLOOD PRESSURE: 67 MMHG | TEMPERATURE: 98.4 F | OXYGEN SATURATION: 96 % | RESPIRATION RATE: 17 BRPM

## 2022-12-15 DIAGNOSIS — R07.9 CHEST PAIN, UNSPECIFIED TYPE: Primary | ICD-10-CM

## 2022-12-15 DIAGNOSIS — E87.6 HYPOKALEMIA: ICD-10-CM

## 2022-12-15 LAB
ABSOLUTE EOS #: <0.03 K/UL (ref 0–0.44)
ABSOLUTE IMMATURE GRANULOCYTE: 0.1 K/UL (ref 0–0.3)
ABSOLUTE LYMPH #: 0.99 K/UL (ref 1.1–3.7)
ABSOLUTE MONO #: 0.6 K/UL (ref 0.1–1.2)
ALBUMIN SERPL-MCNC: 3.7 G/DL (ref 3.5–5.2)
ALBUMIN/GLOBULIN RATIO: 1.1 (ref 1–2.5)
ALP BLD-CCNC: 88 U/L (ref 35–104)
ALT SERPL-CCNC: 8 U/L (ref 5–33)
ANION GAP SERPL CALCULATED.3IONS-SCNC: 10 MMOL/L (ref 9–17)
AST SERPL-CCNC: 32 U/L
BASOPHILS # BLD: 1 % (ref 0–2)
BASOPHILS ABSOLUTE: 0.04 K/UL (ref 0–0.2)
BILIRUB SERPL-MCNC: 1.5 MG/DL (ref 0.3–1.2)
BILIRUBIN URINE: NEGATIVE
BUN BLDV-MCNC: 8 MG/DL (ref 8–23)
CALCIUM SERPL-MCNC: 8.8 MG/DL (ref 8.6–10.4)
CHLORIDE BLD-SCNC: 99 MMOL/L (ref 98–107)
CO2: 29 MMOL/L (ref 20–31)
COLOR: YELLOW
COMMENT UA: NORMAL
CREAT SERPL-MCNC: 0.96 MG/DL (ref 0.5–0.9)
EOSINOPHILS RELATIVE PERCENT: 0 % (ref 1–4)
GFR SERPL CREATININE-BSD FRML MDRD: >60 ML/MIN/1.73M2
GLUCOSE BLD-MCNC: 99 MG/DL (ref 70–99)
GLUCOSE URINE: NEGATIVE
HCT VFR BLD CALC: 37.4 % (ref 36.3–47.1)
HEMOGLOBIN: 11.4 G/DL (ref 11.9–15.1)
IMMATURE GRANULOCYTES: 2 %
KETONES, URINE: NEGATIVE
LACTIC ACID, WHOLE BLOOD: 1.8 MMOL/L (ref 0.7–2.1)
LEUKOCYTE ESTERASE, URINE: NEGATIVE
LIPASE: 20 U/L (ref 13–60)
LYMPHOCYTES # BLD: 17 % (ref 24–43)
MCH RBC QN AUTO: 30.9 PG (ref 25.2–33.5)
MCHC RBC AUTO-ENTMCNC: 30.5 G/DL (ref 28.4–34.8)
MCV RBC AUTO: 101.4 FL (ref 82.6–102.9)
MONOCYTES # BLD: 10 % (ref 3–12)
NITRITE, URINE: NEGATIVE
NRBC AUTOMATED: 0 PER 100 WBC
PDW BLD-RTO: 15.2 % (ref 11.8–14.4)
PH UA: 8 (ref 5–8)
PLATELET # BLD: 266 K/UL (ref 138–453)
PMV BLD AUTO: 10.5 FL (ref 8.1–13.5)
POTASSIUM SERPL-SCNC: 3.3 MMOL/L (ref 3.7–5.3)
PRO-BNP: 763 PG/ML
PROTEIN UA: NEGATIVE
RBC # BLD: 3.69 M/UL (ref 3.95–5.11)
RBC # BLD: ABNORMAL 10*6/UL
SEG NEUTROPHILS: 70 % (ref 36–65)
SEGMENTED NEUTROPHILS ABSOLUTE COUNT: 4.15 K/UL (ref 1.5–8.1)
SODIUM BLD-SCNC: 138 MMOL/L (ref 135–144)
SPECIFIC GRAVITY UA: 1 (ref 1–1.03)
TOTAL PROTEIN: 7.1 G/DL (ref 6.4–8.3)
TROPONIN, HIGH SENSITIVITY: 12 NG/L (ref 0–14)
TROPONIN, HIGH SENSITIVITY: 13 NG/L (ref 0–14)
TURBIDITY: CLEAR
URINE HGB: NEGATIVE
UROBILINOGEN, URINE: NORMAL
WBC # BLD: 5.9 K/UL (ref 3.5–11.3)

## 2022-12-15 PROCEDURE — 93005 ELECTROCARDIOGRAM TRACING: CPT

## 2022-12-15 PROCEDURE — 36415 COLL VENOUS BLD VENIPUNCTURE: CPT

## 2022-12-15 PROCEDURE — 71260 CT THORAX DX C+: CPT

## 2022-12-15 PROCEDURE — 81003 URINALYSIS AUTO W/O SCOPE: CPT

## 2022-12-15 PROCEDURE — 83690 ASSAY OF LIPASE: CPT

## 2022-12-15 PROCEDURE — 83605 ASSAY OF LACTIC ACID: CPT

## 2022-12-15 PROCEDURE — 6360000004 HC RX CONTRAST MEDICATION

## 2022-12-15 PROCEDURE — 71045 X-RAY EXAM CHEST 1 VIEW: CPT

## 2022-12-15 PROCEDURE — 99285 EMERGENCY DEPT VISIT HI MDM: CPT

## 2022-12-15 PROCEDURE — 84484 ASSAY OF TROPONIN QUANT: CPT

## 2022-12-15 PROCEDURE — 6370000000 HC RX 637 (ALT 250 FOR IP): Performed by: STUDENT IN AN ORGANIZED HEALTH CARE EDUCATION/TRAINING PROGRAM

## 2022-12-15 PROCEDURE — 6360000002 HC RX W HCPCS

## 2022-12-15 PROCEDURE — 96374 THER/PROPH/DIAG INJ IV PUSH: CPT

## 2022-12-15 PROCEDURE — 80053 COMPREHEN METABOLIC PANEL: CPT

## 2022-12-15 PROCEDURE — 85025 COMPLETE CBC W/AUTO DIFF WBC: CPT

## 2022-12-15 PROCEDURE — G0378 HOSPITAL OBSERVATION PER HR: HCPCS

## 2022-12-15 PROCEDURE — 83880 ASSAY OF NATRIURETIC PEPTIDE: CPT

## 2022-12-15 PROCEDURE — 93971 EXTREMITY STUDY: CPT

## 2022-12-15 RX ORDER — ONDANSETRON 2 MG/ML
4 INJECTION INTRAMUSCULAR; INTRAVENOUS EVERY 6 HOURS PRN
Status: DISCONTINUED | OUTPATIENT
Start: 2022-12-15 | End: 2022-12-17 | Stop reason: HOSPADM

## 2022-12-15 RX ORDER — ONDANSETRON 4 MG/1
4 TABLET, ORALLY DISINTEGRATING ORAL EVERY 8 HOURS PRN
Status: DISCONTINUED | OUTPATIENT
Start: 2022-12-15 | End: 2022-12-15 | Stop reason: HOSPADM

## 2022-12-15 RX ORDER — ENOXAPARIN SODIUM 100 MG/ML
40 INJECTION SUBCUTANEOUS DAILY
Status: DISCONTINUED | OUTPATIENT
Start: 2022-12-16 | End: 2022-12-17 | Stop reason: HOSPADM

## 2022-12-15 RX ORDER — POTASSIUM CHLORIDE 20 MEQ/1
40 TABLET, EXTENDED RELEASE ORAL PRN
Status: DISCONTINUED | OUTPATIENT
Start: 2022-12-15 | End: 2022-12-15 | Stop reason: HOSPADM

## 2022-12-15 RX ORDER — SODIUM CHLORIDE 0.9 % (FLUSH) 0.9 %
5-40 SYRINGE (ML) INJECTION EVERY 12 HOURS SCHEDULED
Status: DISCONTINUED | OUTPATIENT
Start: 2022-12-15 | End: 2022-12-17 | Stop reason: HOSPADM

## 2022-12-15 RX ORDER — SODIUM CHLORIDE 9 MG/ML
INJECTION, SOLUTION INTRAVENOUS PRN
Status: DISCONTINUED | OUTPATIENT
Start: 2022-12-15 | End: 2022-12-15 | Stop reason: HOSPADM

## 2022-12-15 RX ORDER — SODIUM CHLORIDE 0.9 % (FLUSH) 0.9 %
5-40 SYRINGE (ML) INJECTION EVERY 12 HOURS SCHEDULED
Status: DISCONTINUED | OUTPATIENT
Start: 2022-12-15 | End: 2022-12-15 | Stop reason: HOSPADM

## 2022-12-15 RX ORDER — ACETAMINOPHEN 325 MG/1
650 TABLET ORAL EVERY 4 HOURS PRN
Status: DISCONTINUED | OUTPATIENT
Start: 2022-12-15 | End: 2022-12-15 | Stop reason: HOSPADM

## 2022-12-15 RX ORDER — SODIUM CHLORIDE 9 MG/ML
INJECTION, SOLUTION INTRAVENOUS PRN
Status: DISCONTINUED | OUTPATIENT
Start: 2022-12-15 | End: 2022-12-17 | Stop reason: HOSPADM

## 2022-12-15 RX ORDER — SODIUM CHLORIDE 0.9 % (FLUSH) 0.9 %
5-40 SYRINGE (ML) INJECTION PRN
Status: DISCONTINUED | OUTPATIENT
Start: 2022-12-15 | End: 2022-12-17 | Stop reason: HOSPADM

## 2022-12-15 RX ORDER — ONDANSETRON 2 MG/ML
4 INJECTION INTRAMUSCULAR; INTRAVENOUS EVERY 6 HOURS PRN
Status: DISCONTINUED | OUTPATIENT
Start: 2022-12-15 | End: 2022-12-15 | Stop reason: HOSPADM

## 2022-12-15 RX ORDER — FENTANYL CITRATE 50 UG/ML
50 INJECTION, SOLUTION INTRAMUSCULAR; INTRAVENOUS ONCE
Status: COMPLETED | OUTPATIENT
Start: 2022-12-15 | End: 2022-12-15

## 2022-12-15 RX ORDER — POTASSIUM CHLORIDE 7.45 MG/ML
10 INJECTION INTRAVENOUS PRN
Status: DISCONTINUED | OUTPATIENT
Start: 2022-12-15 | End: 2022-12-15 | Stop reason: HOSPADM

## 2022-12-15 RX ORDER — ACETAMINOPHEN 325 MG/1
650 TABLET ORAL EVERY 4 HOURS PRN
Status: DISCONTINUED | OUTPATIENT
Start: 2022-12-15 | End: 2022-12-17 | Stop reason: HOSPADM

## 2022-12-15 RX ORDER — OXYCODONE HYDROCHLORIDE 5 MG/1
5 TABLET ORAL ONCE
Status: COMPLETED | OUTPATIENT
Start: 2022-12-15 | End: 2022-12-15

## 2022-12-15 RX ORDER — ONDANSETRON 4 MG/1
4 TABLET, ORALLY DISINTEGRATING ORAL EVERY 8 HOURS PRN
Status: DISCONTINUED | OUTPATIENT
Start: 2022-12-15 | End: 2022-12-17 | Stop reason: HOSPADM

## 2022-12-15 RX ORDER — SODIUM CHLORIDE 0.9 % (FLUSH) 0.9 %
5-40 SYRINGE (ML) INJECTION PRN
Status: DISCONTINUED | OUTPATIENT
Start: 2022-12-15 | End: 2022-12-15 | Stop reason: HOSPADM

## 2022-12-15 RX ADMIN — FENTANYL CITRATE 50 MCG: 50 INJECTION, SOLUTION INTRAMUSCULAR; INTRAVENOUS at 15:27

## 2022-12-15 RX ADMIN — OXYCODONE HYDROCHLORIDE 5 MG: 5 TABLET ORAL at 21:05

## 2022-12-15 RX ADMIN — IOPAMIDOL 75 ML: 755 INJECTION, SOLUTION INTRAVENOUS at 14:41

## 2022-12-15 ASSESSMENT — ENCOUNTER SYMPTOMS
COUGH: 0
ABDOMINAL PAIN: 0
COLOR CHANGE: 0
SHORTNESS OF BREATH: 0
EYE REDNESS: 0
CONSTIPATION: 0
RHINORRHEA: 0
SORE THROAT: 0
NAUSEA: 0
VOMITING: 0
DIARRHEA: 0
PHOTOPHOBIA: 0
EYE PAIN: 0
CHEST TIGHTNESS: 1
SINUS PRESSURE: 0

## 2022-12-15 ASSESSMENT — PAIN - FUNCTIONAL ASSESSMENT
PAIN_FUNCTIONAL_ASSESSMENT: 0-10
PAIN_FUNCTIONAL_ASSESSMENT: 0-10

## 2022-12-15 ASSESSMENT — HEART SCORE: ECG: 0

## 2022-12-15 ASSESSMENT — PAIN SCALES - GENERAL
PAINLEVEL_OUTOF10: 10
PAINLEVEL_OUTOF10: 6
PAINLEVEL_OUTOF10: 8
PAINLEVEL_OUTOF10: 6

## 2022-12-15 NOTE — ED NOTES
Pt leaving AMA due to not feeling like its necessary to be here with test results looking okay. Resident aware at this time.      Manuela Agrawal RN  12/15/22 3714

## 2022-12-15 NOTE — Clinical Note
Discharge Plan[de-identified] Other/[facility])   Telemetry/Cardiac Monitoring Required?: Yes

## 2022-12-15 NOTE — ED PROVIDER NOTES
[address]  ED  Emergency Department Encounter  Emergency Medicine Resident     Pt Jackie Crane  MRN: [id number]  [place] 1955  Date of evaluation: 12/15/22  PCP:  MARIA M Rosario CNP      CHIEF COMPLAINT       Chief Complaint   Patient presents with    Shortness of Breath    Chest Pain       HISTORY OF PRESENT ILLNESS  (Location/Symptom, Timing/Onset, Context/Setting, Quality, Duration, Modifying Factors, Severity.)      Cruzito Eckert is a 74 y.o. female who presents with chest/abdominal cramping and pains 3 days post op knee replacement. Patient has no pleuritic chest pain and no SOB. Her chest is tender to palpation and so is her abdomen. Her left calf was tender so we had concerns for DVT-PE. Doppler negative and CTPE negative. Tropes were normal. Patient put in observation for further testing    PAST MEDICAL / SURGICAL / SOCIAL / FAMILY HISTORY      has a past medical history of Chronic back pain, Hypertension, and Hypothyroidism. has a past surgical history that includes Spine surgery (2005); Hysterectomy (2005); Cholecystectomy; and Hip fracture surgery (Right, 06/03/2016). Social History     Socioeconomic History    Marital status:       Spouse name: Not on file    Number of children: Not on file    Years of education: Not on file    Highest education level: Not on file   Occupational History    Not on file   Tobacco Use    Smoking status: Never    Smokeless tobacco: Never   Vaping Use    Vaping Use: Never used   Substance and Sexual Activity    Alcohol use: No    Drug use: No    Sexual activity: Never   Other Topics Concern    Not on file   Social History Narrative    Not on file     Social Determinants of Health     Financial Resource Strain: Low Risk     Difficulty of Paying Living Expenses: Not hard at all   Food Insecurity: No Food Insecurity    Worried About [address] in the Last Year: Never true    [address] [facility] in the Last Year: Never true   Transportation Needs: Not on file   Physical Activity: Not on file   Stress: Not on file   Social Connections: Not on file   Intimate Partner Violence: Not on file   Housing Stability: Not on file       Family History   Problem Relation Age of Onset    High Blood Pressure Mother     Other Mother         low heart rate     Pacemaker Mother     Heart Attack Father     Lung Cancer Father     Cancer Sister         throat cancer x2    Breast Cancer Paternal Aunt     Cancer Paternal Aunt         multiple other cancers        Allergies:  Patient has no known allergies. Home Medications:  Prior to Admission medications    Medication Sig Start Date End Date Taking? Authorizing Provider   busPIRone (BUSPAR) 10 MG tablet TAKE 1 TO 2 TABLETS BY MOUTH THREE TIMES DAILY AS NEEDED 12/13/22   MARIA M Andrews - CNP   sertraline (ZOLOFT) 25 MG tablet Take 1 tablet by mouth daily 12/12/22   MARIA M Andrews - CNP   Vitamin D, Ergocalciferol, 21827 units CAPS Take 50,000 Units by mouth once a week 11/22/22   Brayden Weinstein APRN - CNP   levothyroxine (SYNTHROID) 100 MCG tablet Take 1 tablet by mouth daily 9/16/22   MARIA M Andrews - CNP   Melatonin 10 MG TABS Take 1 tablet by mouth nightly 8/19/22   Brayden Weinstein APRN - CNP   gabapentin (NEURONTIN) 300 MG capsule Take 300 mg by mouth in the morning and 300 mg before bedtime. Historical Provider, MD   morphine (MSIR) 15 MG tablet Take 15 mg by mouth in the morning and 15 mg before bedtime. Historical Provider, MD   meloxicam (MOBIC) 7.5 MG tablet Take 1 tablet by mouth daily as needed for Pain 7/19/22   MARIA M Andrews - CNP   atenolol (TENORMIN) 25 MG tablet Take 25 mg by mouth daily    Historical Provider, MD   Docusate Calcium (STOOL SOFTENER PO) Take 2 capsules by mouth as needed. Historical Provider, MD       REVIEW OF SYSTEMS    (2-9 systems for level 4, 10 or more for level 5)      Review of Systems   Constitutional:  Negative for chills and fever. HENT:  Negative for rhinorrhea and sore throat. Respiratory:  Negative for cough and shortness of breath. Cardiovascular:  Negative for chest pain. Gastrointestinal:  Positive for abdominal pain. Negative for nausea and vomiting. Genitourinary:  Negative for dysuria, frequency and urgency. Musculoskeletal:  Positive for joint swelling. Neurological:  Negative for dizziness and light-headedness. PHYSICAL EXAM   (up to 7 for level 4, 8 or more for level 5)      INITIAL VITALS:   /72   Pulse 66   Temp 98.4 °F (36.9 °C) (Oral)   Resp 17   Wt 155 lb (70.3 kg)   SpO2 92%   BMI 22.24 kg/m²     Physical Exam  Constitutional:       General: She is not in acute distress. Appearance: She is not ill-appearing. HENT:      Head: Atraumatic. Nose: No congestion. Mouth/Throat:      Mouth: Mucous membranes are moist.   Cardiovascular:      Rate and Rhythm: Normal rate and regular rhythm. Heart sounds: No murmur heard. Pulmonary:      Effort: Pulmonary effort is normal. No respiratory distress. Breath sounds: Normal breath sounds. No wheezing. Chest:      Chest wall: Tenderness present. Abdominal:      General: Abdomen is flat. Bowel sounds are normal. There is no distension. Palpations: Abdomen is soft. There is no mass. Tenderness: There is abdominal tenderness. Hernia: No hernia is present. Musculoskeletal:      Cervical back: No rigidity or tenderness. Right lower leg: Tenderness present. Neurological:      General: No focal deficit present. Mental Status: She is alert and oriented to person, place, and time.    Psychiatric:         Mood and Affect: Mood normal.       DIFFERENTIAL  DIAGNOSIS     PLAN (LABS / IMAGING / EKG):  Orders Placed This Encounter   Procedures    CT CHEST PULMONARY EMBOLISM W CONTRAST    VL DUP LOWER EXTREMITY VENOUS RIGHT    XR CHEST PORTABLE    Troponin    CBC with Auto Differential    Comprehensive Metabolic Panel Lipase    Lactic Acid    Brain Natriuretic Peptide    Urinalysis with Reflex to Culture    ADULT DIET;  Regular; 3 carb choices (45 gm/meal)    Diet NPO    Vital signs per unit routine    Notify physician    Notify physician    Telemetry monitoring - 72 hour duration    Up with assistance    Place intermittent pneumatic compression device    Full Code    Inpatient consult to Cardiology    Inpatient consult to Orthopedic Surgery    EKG 12 Lead    Place in Observation Service       MEDICATIONS ORDERED:  Orders Placed This Encounter   Medications    fentaNYL (SUBLIMAZE) injection 50 mcg    iopamidol (ISOVUE-370) 76 % injection 75 mL    sodium chloride flush 0.9 % injection 5-40 mL    sodium chloride flush 0.9 % injection 5-40 mL    0.9 % sodium chloride infusion    acetaminophen (TYLENOL) tablet 650 mg    OR Linked Order Group     ondansetron (ZOFRAN-ODT) disintegrating tablet 4 mg     ondansetron (ZOFRAN) injection 4 mg    OR Linked Order Group     potassium chloride (KLOR-CON M) extended release tablet 40 mEq     potassium bicarb-citric acid (EFFER-K) effervescent tablet 40 mEq     potassium chloride 10 mEq/100 mL IVPB (Peripheral Line)       DDX: PE, CAD/ACS, abdominal cramps    DIAGNOSTIC RESULTS / EMERGENCY DEPARTMENT COURSE / MDM   LAB RESULTS:  Results for orders placed or performed during the hospital encounter of 12/15/22   Troponin   Result Value Ref Range    Troponin, High Sensitivity 12 0 - 14 ng/L   CBC with Auto Differential   Result Value Ref Range    WBC 5.9 3.5 - 11.3 k/uL    RBC 3.69 (L) 3.95 - 5.11 m/uL    Hemoglobin 11.4 (L) 11.9 - 15.1 g/dL    Hematocrit 37.4 36.3 - 47.1 %    .4 82.6 - 102.9 fL    MCH 30.9 25.2 - 33.5 pg    MCHC 30.5 28.4 - 34.8 g/dL    RDW 15.2 (H) 11.8 - 14.4 %    Platelets 526 558 - 978 k/uL    MPV 10.5 8.1 - 13.5 fL    NRBC Automated 0.0 0.0 per 100 WBC    Seg Neutrophils 70 (H) 36 - 65 %    Lymphocytes 17 (L) 24 - 43 %    Monocytes 10 3 - 12 %    Eosinophils % 0 (L) 1 - 4 %    Basophils 1 0 - 2 %    Immature Granulocytes 2 (H) 0 %    Segs Absolute 4.15 1.50 - 8.10 k/uL    Absolute Lymph # 0.99 (L) 1.10 - 3.70 k/uL    Absolute Mono # 0.60 0.10 - 1.20 k/uL    Absolute Eos # <0.03 0.00 - 0.44 k/uL    Basophils Absolute 0.04 0.00 - 0.20 k/uL    Absolute Immature Granulocyte 0.10 0.00 - 0.30 k/uL    RBC Morphology ANISOCYTOSIS PRESENT    Comprehensive Metabolic Panel   Result Value Ref Range    Glucose 99 70 - 99 mg/dL    BUN 8 8 - 23 mg/dL    Creatinine 0.96 (H) 0.50 - 0.90 mg/dL    Est, Glom Filt Rate >60 >60 mL/min/1.73m2    Calcium 8.8 8.6 - 10.4 mg/dL    Sodium 138 135 - 144 mmol/L    Potassium 3.3 (L) 3.7 - 5.3 mmol/L    Chloride 99 98 - 107 mmol/L    CO2 29 20 - 31 mmol/L    Anion Gap 10 9 - 17 mmol/L    Alkaline Phosphatase 88 35 - 104 U/L    ALT 8 5 - 33 U/L    AST 32 (H) <32 U/L    Total Bilirubin 1.5 (H) 0.3 - 1.2 mg/dL    Total Protein 7.1 6.4 - 8.3 g/dL    Albumin 3.7 3.5 - 5.2 g/dL    Albumin/Globulin Ratio 1.1 1.0 - 2.5   Lipase   Result Value Ref Range    Lipase 20 13 - 60 U/L   Lactic Acid   Result Value Ref Range    Lactic Acid, Whole Blood 1.8 0.7 - 2.1 mmol/L   Brain Natriuretic Peptide   Result Value Ref Range    Pro- (H) <300 pg/mL   Urinalysis with Reflex to Culture    Specimen: Urine   Result Value Ref Range    Color, UA Yellow Yellow    Turbidity UA Clear Clear    Glucose, Ur NEGATIVE NEGATIVE    Bilirubin Urine NEGATIVE NEGATIVE    Ketones, Urine NEGATIVE NEGATIVE    Specific [place], UA 1.005 1.005 - 1.030    Urine Hgb NEGATIVE NEGATIVE    pH, UA 8.0 5.0 - 8.0    Protein, UA NEGATIVE NEGATIVE    Urobilinogen, Urine Normal Normal    Nitrite, Urine NEGATIVE NEGATIVE    Leukocyte Esterase, Urine NEGATIVE NEGATIVE    Urinalysis Comments       Microscopic exam not performed based on chemical results unless requested in original order.        IMPRESSION:     RADIOLOGY:  CT CHEST PULMONARY EMBOLISM W CONTRAST   Final Result   No pulmonary embolism or acute pulmonary abnormality. Enlarged main   pulmonary artery could be due to pulmonary hypertension. VL DUP LOWER EXTREMITY VENOUS RIGHT    (Results Pending)   XR CHEST PORTABLE    (Results Pending)         EKG      All EKG's are interpreted by the Emergency Department Physician who either signs or Co-signs this chart in the absence of a cardiologist.    EMERGENCY DEPARTMENT COURSE:    CTPE normal  Labs showed no abnormalities other than K+ 3.3  Tropes normal  Lactate normal  Moved for observation         No notes of EC Admission Criteria type on file. PROCEDURES:      CONSULTS:  IP CONSULT TO CARDIOLOGY  IP CONSULT TO ORTHOPEDIC SURGERY    CRITICAL CARE:      FINAL IMPRESSION      Post op chest pain     DISPOSITION / PLAN     DISPOSITION Admitted 12/15/2022 03:19:58 PM      PATIENT REFERRED TO:  No follow-up provider specified.     DISCHARGE MEDICATIONS:  New Prescriptions    No medications on file       Yaakov Nguyen MD  Emergency Medicine Resident    (Please note that portions of thisnote were completed with a voice recognition program.  Efforts were made to edit the dictations but occasionally words are mis-transcribed.)       Francisca Ramesh MD  Resident  12/15/22 1625

## 2022-12-15 NOTE — ED PROVIDER NOTES
[facility]     Emergency Department     Faculty Attestation    I performed a history and physical examination of the patient and discussed management with the resident. I reviewed the resident´s note and agree with the documented findings and plan of care. Any areas of disagreement are noted on the chart. I was personally present for the key portions of any procedures. I have documented in the chart those procedures where I was not present during the key portions. I have reviewed the emergency nurses triage note. I agree with the chief complaint, past medical history, past surgical history, allergies, medications, social and family history as documented unless otherwise noted below. For Physician Assistant/ Nurse Practitioner cases/documentation I have personally evaluated this patient and have completed at least one if not all key elements of the E/M (history, physical exam, and MDM). Additional findings are as noted. History of total knee replacement 3 days ago, now the patient has chest pain. She denies shortness of breath or pleuritic pain. On exam the left calf is tender and swollen, abdomen has some diffuse mild tenderness especially in the suprapubic area, equal breath sounds, heart regular rate and rhythm. Heart score is 5 before EKG or troponin have resulted.      Rafal Doshi MD  12/15/22 5243

## 2022-12-15 NOTE — ED NOTES
This patient was assessed by the doctor only.  Nurse processed and completed the orders from the doctor ie labs, meds, and/or EKG       Manuela Agrawal RN  12/15/22 2978

## 2022-12-16 ENCOUNTER — APPOINTMENT (OUTPATIENT)
Dept: NUCLEAR MEDICINE | Age: 67
End: 2022-12-16
Payer: MEDICARE

## 2022-12-16 ENCOUNTER — APPOINTMENT (OUTPATIENT)
Dept: NON INVASIVE DIAGNOSTICS | Age: 67
End: 2022-12-16
Payer: MEDICARE

## 2022-12-16 LAB
EKG ATRIAL RATE: 73 BPM
EKG P AXIS: -12 DEGREES
EKG P-R INTERVAL: 182 MS
EKG Q-T INTERVAL: 386 MS
EKG QRS DURATION: 66 MS
EKG QTC CALCULATION (BAZETT): 425 MS
EKG VENTRICULAR RATE: 73 BPM
LV EF: 70 %
LVEF MODALITY: NORMAL

## 2022-12-16 PROCEDURE — 2580000003 HC RX 258: Performed by: FAMILY MEDICINE

## 2022-12-16 PROCEDURE — G0378 HOSPITAL OBSERVATION PER HR: HCPCS

## 2022-12-16 PROCEDURE — 3430000000 HC RX DIAGNOSTIC RADIOPHARMACEUTICAL: Performed by: INTERNAL MEDICINE

## 2022-12-16 PROCEDURE — 6370000000 HC RX 637 (ALT 250 FOR IP): Performed by: FAMILY MEDICINE

## 2022-12-16 PROCEDURE — 99220 PR INITIAL OBSERVATION CARE/DAY 70 MINUTES: CPT | Performed by: FAMILY MEDICINE

## 2022-12-16 PROCEDURE — 96372 THER/PROPH/DIAG INJ SC/IM: CPT

## 2022-12-16 PROCEDURE — 78452 HT MUSCLE IMAGE SPECT MULT: CPT

## 2022-12-16 PROCEDURE — 93017 CV STRESS TEST TRACING ONLY: CPT

## 2022-12-16 PROCEDURE — 6360000002 HC RX W HCPCS: Performed by: FAMILY MEDICINE

## 2022-12-16 PROCEDURE — 2580000003 HC RX 258: Performed by: INTERNAL MEDICINE

## 2022-12-16 PROCEDURE — 6360000002 HC RX W HCPCS: Performed by: INTERNAL MEDICINE

## 2022-12-16 PROCEDURE — A9500 TC99M SESTAMIBI: HCPCS | Performed by: INTERNAL MEDICINE

## 2022-12-16 RX ORDER — DOCUSATE SODIUM 100 MG/1
200 CAPSULE, LIQUID FILLED ORAL PRN
Status: DISCONTINUED | OUTPATIENT
Start: 2022-12-16 | End: 2022-12-17 | Stop reason: HOSPADM

## 2022-12-16 RX ORDER — ATROPINE SULFATE 0.1 MG/ML
0.5 INJECTION INTRAVENOUS EVERY 5 MIN PRN
Status: DISCONTINUED | OUTPATIENT
Start: 2022-12-16 | End: 2022-12-16 | Stop reason: SDUPTHER

## 2022-12-16 RX ORDER — METOPROLOL TARTRATE 5 MG/5ML
5 INJECTION INTRAVENOUS EVERY 5 MIN PRN
Status: DISCONTINUED | OUTPATIENT
Start: 2022-12-16 | End: 2022-12-16 | Stop reason: SDUPTHER

## 2022-12-16 RX ORDER — AMINOPHYLLINE DIHYDRATE 25 MG/ML
50 INJECTION, SOLUTION INTRAVENOUS PRN
Status: DISCONTINUED | OUTPATIENT
Start: 2022-12-16 | End: 2022-12-16 | Stop reason: SDUPTHER

## 2022-12-16 RX ORDER — GABAPENTIN 300 MG/1
300 CAPSULE ORAL 2 TIMES DAILY
Status: DISCONTINUED | OUTPATIENT
Start: 2022-12-16 | End: 2022-12-17 | Stop reason: HOSPADM

## 2022-12-16 RX ORDER — METOPROLOL TARTRATE 5 MG/5ML
5 INJECTION INTRAVENOUS EVERY 5 MIN PRN
Status: ACTIVE | OUTPATIENT
Start: 2022-12-16 | End: 2022-12-16

## 2022-12-16 RX ORDER — ATROPINE SULFATE 0.1 MG/ML
0.5 INJECTION INTRAVENOUS EVERY 5 MIN PRN
Status: ACTIVE | OUTPATIENT
Start: 2022-12-16 | End: 2022-12-16

## 2022-12-16 RX ORDER — ALBUTEROL SULFATE 90 UG/1
2 AEROSOL, METERED RESPIRATORY (INHALATION) PRN
Status: ACTIVE | OUTPATIENT
Start: 2022-12-16 | End: 2022-12-16

## 2022-12-16 RX ORDER — SODIUM CHLORIDE 0.9 % (FLUSH) 0.9 %
5-40 SYRINGE (ML) INJECTION PRN
Status: ACTIVE | OUTPATIENT
Start: 2022-12-16 | End: 2022-12-16

## 2022-12-16 RX ORDER — NITROGLYCERIN 0.4 MG/1
0.4 TABLET SUBLINGUAL EVERY 5 MIN PRN
Status: ACTIVE | OUTPATIENT
Start: 2022-12-16 | End: 2022-12-16

## 2022-12-16 RX ORDER — ALBUTEROL SULFATE 90 UG/1
2 AEROSOL, METERED RESPIRATORY (INHALATION) PRN
Status: DISCONTINUED | OUTPATIENT
Start: 2022-12-16 | End: 2022-12-16 | Stop reason: SDUPTHER

## 2022-12-16 RX ORDER — TECHNETIUM TC-99M SESTAMIBI 1 MG/10ML
35 INJECTION INTRAVENOUS
Status: COMPLETED | OUTPATIENT
Start: 2022-12-16 | End: 2022-12-16

## 2022-12-16 RX ORDER — NITROGLYCERIN 0.4 MG/1
0.4 TABLET SUBLINGUAL EVERY 5 MIN PRN
Status: DISCONTINUED | OUTPATIENT
Start: 2022-12-16 | End: 2022-12-16 | Stop reason: SDUPTHER

## 2022-12-16 RX ORDER — SODIUM CHLORIDE 9 MG/ML
500 INJECTION, SOLUTION INTRAVENOUS CONTINUOUS PRN
Status: DISCONTINUED | OUTPATIENT
Start: 2022-12-16 | End: 2022-12-16 | Stop reason: SDUPTHER

## 2022-12-16 RX ORDER — SODIUM CHLORIDE 9 MG/ML
500 INJECTION, SOLUTION INTRAVENOUS CONTINUOUS PRN
Status: ACTIVE | OUTPATIENT
Start: 2022-12-16 | End: 2022-12-16

## 2022-12-16 RX ORDER — LANOLIN ALCOHOL/MO/W.PET/CERES
3 CREAM (GRAM) TOPICAL NIGHTLY
Status: DISCONTINUED | OUTPATIENT
Start: 2022-12-16 | End: 2022-12-17 | Stop reason: HOSPADM

## 2022-12-16 RX ORDER — MORPHINE SULFATE 15 MG/1
15 TABLET, FILM COATED, EXTENDED RELEASE ORAL 2 TIMES DAILY
COMMUNITY

## 2022-12-16 RX ORDER — OXYCODONE HYDROCHLORIDE AND ACETAMINOPHEN 5; 325 MG/1; MG/1
1 TABLET ORAL EVERY 6 HOURS PRN
COMMUNITY

## 2022-12-16 RX ORDER — AMINOPHYLLINE DIHYDRATE 25 MG/ML
50 INJECTION, SOLUTION INTRAVENOUS PRN
Status: ACTIVE | OUTPATIENT
Start: 2022-12-16 | End: 2022-12-16

## 2022-12-16 RX ORDER — DOCUSATE SODIUM 100 MG/1
200 CAPSULE, LIQUID FILLED ORAL DAILY
COMMUNITY

## 2022-12-16 RX ORDER — MELOXICAM 7.5 MG/1
7.5 TABLET ORAL DAILY PRN
Status: DISCONTINUED | OUTPATIENT
Start: 2022-12-16 | End: 2022-12-17 | Stop reason: HOSPADM

## 2022-12-16 RX ORDER — MORPHINE SULFATE 15 MG/1
15 TABLET ORAL 2 TIMES DAILY
Status: DISCONTINUED | OUTPATIENT
Start: 2022-12-16 | End: 2022-12-16

## 2022-12-16 RX ORDER — ATENOLOL 25 MG/1
25 TABLET ORAL DAILY
Status: DISCONTINUED | OUTPATIENT
Start: 2022-12-16 | End: 2022-12-17 | Stop reason: HOSPADM

## 2022-12-16 RX ORDER — LEVOTHYROXINE SODIUM 0.1 MG/1
100 TABLET ORAL DAILY
Status: DISCONTINUED | OUTPATIENT
Start: 2022-12-16 | End: 2022-12-17 | Stop reason: HOSPADM

## 2022-12-16 RX ORDER — SODIUM CHLORIDE 0.9 % (FLUSH) 0.9 %
5-40 SYRINGE (ML) INJECTION PRN
Status: DISCONTINUED | OUTPATIENT
Start: 2022-12-16 | End: 2022-12-16 | Stop reason: SDUPTHER

## 2022-12-16 RX ORDER — LIDOCAINE 4 G/G
1 PATCH TOPICAL DAILY
Status: DISCONTINUED | OUTPATIENT
Start: 2022-12-16 | End: 2022-12-17 | Stop reason: HOSPADM

## 2022-12-16 RX ORDER — BUSPIRONE HYDROCHLORIDE 10 MG/1
10 TABLET ORAL 3 TIMES DAILY PRN
Status: DISCONTINUED | OUTPATIENT
Start: 2022-12-16 | End: 2022-12-17 | Stop reason: HOSPADM

## 2022-12-16 RX ORDER — ERGOCALCIFEROL 1.25 MG/1
50000 CAPSULE ORAL WEEKLY
Status: DISCONTINUED | OUTPATIENT
Start: 2022-12-16 | End: 2022-12-17 | Stop reason: HOSPADM

## 2022-12-16 RX ORDER — TECHNETIUM TC-99M SESTAMIBI 1 MG/10ML
15 INJECTION INTRAVENOUS
Status: COMPLETED | OUTPATIENT
Start: 2022-12-16 | End: 2022-12-16

## 2022-12-16 RX ORDER — SODIUM CHLORIDE 0.9 % (FLUSH) 0.9 %
10 SYRINGE (ML) INJECTION PRN
Status: DISCONTINUED | OUTPATIENT
Start: 2022-12-16 | End: 2022-12-17 | Stop reason: HOSPADM

## 2022-12-16 RX ORDER — OXYCODONE HYDROCHLORIDE AND ACETAMINOPHEN 5; 325 MG/1; MG/1
1 TABLET ORAL EVERY 6 HOURS PRN
Status: DISCONTINUED | OUTPATIENT
Start: 2022-12-16 | End: 2022-12-17 | Stop reason: HOSPADM

## 2022-12-16 RX ADMIN — Medication 15 MILLICURIE: at 11:04

## 2022-12-16 RX ADMIN — SODIUM CHLORIDE, PRESERVATIVE FREE 10 ML: 5 INJECTION INTRAVENOUS at 23:08

## 2022-12-16 RX ADMIN — ENOXAPARIN SODIUM 40 MG: 100 INJECTION SUBCUTANEOUS at 08:55

## 2022-12-16 RX ADMIN — Medication 10 ML: at 14:10

## 2022-12-16 RX ADMIN — GABAPENTIN 300 MG: 300 CAPSULE ORAL at 08:55

## 2022-12-16 RX ADMIN — BUSPIRONE HYDROCHLORIDE 10 MG: 10 TABLET ORAL at 09:39

## 2022-12-16 RX ADMIN — LEVOTHYROXINE SODIUM 100 MCG: 0.1 TABLET ORAL at 08:55

## 2022-12-16 RX ADMIN — Medication 3 MG: at 23:06

## 2022-12-16 RX ADMIN — ATENOLOL 25 MG: 25 TABLET ORAL at 08:55

## 2022-12-16 RX ADMIN — OXYCODONE HYDROCHLORIDE AND ACETAMINOPHEN 1 TABLET: 5; 325 TABLET ORAL at 10:31

## 2022-12-16 RX ADMIN — SODIUM CHLORIDE, PRESERVATIVE FREE 10 ML: 5 INJECTION INTRAVENOUS at 10:33

## 2022-12-16 RX ADMIN — SERTRALINE HYDROCHLORIDE 25 MG: 50 TABLET ORAL at 09:39

## 2022-12-16 RX ADMIN — OXYCODONE HYDROCHLORIDE AND ACETAMINOPHEN 1 TABLET: 5; 325 TABLET ORAL at 23:06

## 2022-12-16 RX ADMIN — GABAPENTIN 300 MG: 300 CAPSULE ORAL at 23:06

## 2022-12-16 RX ADMIN — Medication 35 MILLICURIE: at 14:10

## 2022-12-16 RX ADMIN — REGADENOSON 0.4 MG: 0.08 INJECTION, SOLUTION INTRAVENOUS at 11:02

## 2022-12-16 RX ADMIN — MELOXICAM 7.5 MG: 7.5 TABLET ORAL at 09:39

## 2022-12-16 RX ADMIN — BUSPIRONE HYDROCHLORIDE 10 MG: 10 TABLET ORAL at 23:06

## 2022-12-16 RX ADMIN — OXYCODONE HYDROCHLORIDE AND ACETAMINOPHEN 1 TABLET: 5; 325 TABLET ORAL at 16:25

## 2022-12-16 ASSESSMENT — PAIN SCALES - GENERAL
PAINLEVEL_OUTOF10: 4
PAINLEVEL_OUTOF10: 6
PAINLEVEL_OUTOF10: 7
PAINLEVEL_OUTOF10: 6
PAINLEVEL_OUTOF10: 10
PAINLEVEL_OUTOF10: 6
PAINLEVEL_OUTOF10: 6

## 2022-12-16 ASSESSMENT — PAIN DESCRIPTION - DESCRIPTORS
DESCRIPTORS: CRAMPING;DULL
DESCRIPTORS: ACHING;CRAMPING
DESCRIPTORS: ACHING

## 2022-12-16 ASSESSMENT — PAIN DESCRIPTION - ORIENTATION
ORIENTATION: LEFT;RIGHT
ORIENTATION: LEFT
ORIENTATION: RIGHT;LEFT

## 2022-12-16 ASSESSMENT — PAIN DESCRIPTION - LOCATION
LOCATION: BACK
LOCATION: RIB CAGE
LOCATION: LEG

## 2022-12-16 NOTE — PROGRESS NOTES
Medication History completed:    New medications: none    Medications discontinued: none    Medications flagged fore review:  Melatonin - not taking    Changes to dosing:   Docusate changed to 200 mg daily    Stated allergies: NKDA    Other pertinent information: Medications confirmed with [facility]. The patient reports she has not been taking the Morphine while on the Percocet.      Thank you,  Esthela Smith, PharmD, [facility]  [phone]

## 2022-12-16 NOTE — PLAN OF CARE
Stress this shift d/t chest pain. Rib cage pain this shift, dr green.  Percocet given    Problem: Discharge Planning  Goal: Discharge to home or other facility with appropriate resources  Outcome: Progressing   Tbd home  Problem: Safety - Adult  Goal: Free from fall injury  Outcome: Progressing   met  Problem: ABCDS Injury Assessment  Goal: Absence of physical injury  Outcome: Progressing   met

## 2022-12-16 NOTE — ED NOTES
Pt assisted to commode chair for urine , safely returned to bed  And attached to monitor        Suraj Kent RN  12/16/22 3800

## 2022-12-16 NOTE — ED PROVIDER NOTES
EMERGENCY DEPARTMENT ENCOUNTER   ATTENDING ATTESTATION     Pt Name: Elie Person  MRN: [id number]  [place] 1955  Date of evaluation: 12/15/22       Elie Person is a 74 y.o. female who presents with Chest Pain (Chest pain  on and off since 2 weeks  )    68-year-old history of hypertension and smoker presents with chest pain    Patient was just seen at the emergency department had extensive work-up including negative troponin CT pulmonary embolism and was admitted for observation but she requested to leave and left AGAINST MEDICAL ADVICE    Has having persistent chest pain so came back now    Plan is to repeat a troponin and likely admit for cardiology evaluation    MDM:     Troponin negative we will admit for heart score of 4 for cardiac restratification    Vitals:   Vitals:    12/15/22 2000 12/15/22 2005 12/15/22 2030 12/15/22 2042   BP: (!) 120/58  122/74    Pulse:  69 72 69   Resp: 20      Temp: 98.6 °F (37 °C)      TempSrc: Oral      SpO2: 98%  100%          I personally saw and examined the patient. I have reviewed and agree with the resident's findings, including all diagnostic interpretations and treatment plan as written. I was present for the key portions of any procedures performed and the inclusive time noted for any critical care statement. The care is provided during an unprecedented national emergency due to the novel coronavirus, COVID 19.   Estefania Chu MD  Attending Emergency Physician           Estefania Chu MD  12/15/22 8569

## 2022-12-16 NOTE — CONSULTS
[facility] Cardiology Cardiology    Consult                        Today's Date: 12/16/2022  Patient Name: Mandeep Angeles  Date of admission: 12/15/2022  7:56 PM  Patient's age: 74 y.o., 1955  Admission Dx: Chest pain [R07.9]    Reason for Consult:  Cardiac evaluation    Requesting Physician: Huy Pearl MD    CHIEF COMPLAINT:  Chest pain    History Obtained From:  patient, electronic medical record    HISTORY OF PRESENT ILLNESS:      The patient is a 74 y.o.  female who is admitted to the hospital for chest pain. She had left knee replacement 12/7/22. She reports epigastric pain radiating to mid sternum and squeezing in nature. She denies any pain at present. HS trop 12 and 13    CT chest- negative for PE    Past Medical History:   has a past medical history of Chronic back pain, Hypertension, and Hypothyroidism. Past Surgical History:   has a past surgical history that includes Spine surgery (2005); Hysterectomy (2005); Cholecystectomy; and Hip fracture surgery (Right, 06/03/2016). Home Medications:    Prior to Admission medications    Medication Sig Start Date End Date Taking? Authorizing Provider   busPIRone (BUSPAR) 10 MG tablet TAKE 1 TO 2 TABLETS BY MOUTH THREE TIMES DAILY AS NEEDED 12/13/22   MARIA M Iqbal CNP   sertraline (ZOLOFT) 25 MG tablet Take 1 tablet by mouth daily 12/12/22   MARIA M Iqbal CNP   Vitamin D, Ergocalciferol, 54510 units CAPS Take 50,000 Units by mouth once a week 11/22/22   MARIA M Iqbal CNP   levothyroxine (SYNTHROID) 100 MCG tablet Take 1 tablet by mouth daily 9/16/22   MARIA M Iqbal CNP   Melatonin 10 MG TABS Take 1 tablet by mouth nightly 8/19/22   MARIA M Iqbal CNP   gabapentin (NEURONTIN) 300 MG capsule Take 300 mg by mouth in the morning and 300 mg before bedtime. Historical Provider, MD   morphine (MSIR) 15 MG tablet Take 15 mg by mouth in the morning and 15 mg before bedtime.     Historical Provider, MD   meloxicam (MOBIC) 7.5 MG tablet Take 1 tablet by mouth daily as needed for Pain 7/19/22   Rachael Barnhart, APRN - CNP   atenolol (TENORMIN) 25 MG tablet Take 25 mg by mouth daily    Historical Provider, MD   Docusate Calcium (STOOL SOFTENER PO) Take 2 capsules by mouth as needed. Historical Provider, MD       Allergies:  Patient has no known allergies. Social History:   reports that she has never smoked. She has never used smokeless tobacco. She reports that she does not drink alcohol and does not use drugs. Family History: family history includes Breast Cancer in her paternal aunt; Cancer in her paternal aunt and sister; Heart Attack in her father; High Blood Pressure in her mother; Lung Cancer in her father; Other in her mother; Pacemaker in her mother. No h/o sudden cardiac death. No for premature CAD    REVIEW OF SYSTEMS:    Constitutional: there has been no unanticipated weight loss. There's been No change in energy level, No change in activity level. Eyes: No visual changes or diplopia. No scleral icterus. ENT: No Headaches, hearing loss or vertigo. No mouth sores or sore throat. Cardiovascular: see above  Respiratory: see above  Gastrointestinal: No abdominal pain, appetite loss, blood in stools. Genitourinary: No dysuria, trouble voiding, or hematuria. Musculoskeletal:  see above  Integumentary: No rash or pruritis. Neurological: No headache or diplopia. No tingling  Psychiatric: No anxiety, or depression. Endocrine: No temperature intolerance. Hematologic/Lymphatic: No abnormal bruising or bleeding, blood clots or swollen lymph nodes. Allergic/Immunologic: No nasal congestion or hives. PHYSICAL EXAM:      /78   Pulse 72   Temp 98.2 °F (36.8 °C) (Oral)   Resp 15   SpO2 99%    Constitutional and General Appearance: alert, cooperative, no distress and appears stated age  HEENT: PERRL, no cervical lymphadenopathy. No masses palpable.  Normal oral mucosa  Respiratory:  Normal excursion and expansion without use of accessory muscles  Resp Auscultation: Good respiratory effort. No for increased work of breathing. On auscultation: clear to auscultation bilaterally  Cardiovascular:  Heart tones are crisp and normal. regular S1 and S2.  Jugular venous pulsation Normal  The carotid upstroke is normal in amplitude and contour without delay or bruit   Abdomen:   soft  Bowel sounds present  Extremities:   No edema  Neurological:  Alert and oriented. DATA:    Diagnostics:    EKG: SR, PAC      Labs:     CBC:   Recent Labs     12/15/22  1254   WBC 5.9   HGB 11.4*   HCT 37.4        BMP:   Recent Labs     12/15/22  1254      K 3.3*   CO2 29   BUN 8   CREATININE 0.96*   LABGLOM >60   GLUCOSE 99     BNP: No results for input(s): BNP in the last 72 hours. PT/INR: No results for input(s): PROTIME, INR in the last 72 hours. APTT:No results for input(s): APTT in the last 72 hours. CARDIAC ENZYMES:No results for input(s): CKTOTAL, CKMB, CKMBINDEX, TROPONINI in the last 72 hours. FASTING LIPID PANEL:  Lab Results   Component Value Date/Time    HDL 31 08/12/2022 12:50 PM    TRIG 102 08/12/2022 12:50 PM     LIVER PROFILE:  Recent Labs     12/15/22  1254   AST 32*   ALT 8   LABALBU 3.7       IMPRESSION:    Patient Active Problem List   Diagnosis    Chronic back pain    Varicose veins of legs    Renal insufficiency    Acquired hypothyroidism    Status post cholecystectomy    History of bilateral hip replacements    Shortness of breath on exertion    Vitamin D deficiency    Pulmonary nodule    Chest pain     Chest pain  Borderline dilated heart on CT chest  HTN  Hypokalemia  Hypothyroidism  Recent left knee replacement  No PE on CT chest.    RECOMMENDATIONS:  Obtain lexiscan stress test for risk stratification. Obtain TTE for evaluation of LV size and function along with valve function. Replace K      Discussed with patient and Nurse.     Rupa Manzano MD, MD  [facility] Consult [phone]

## 2022-12-16 NOTE — PROGRESS NOTES
Attempt to page dr Beatriz Verduzco via perfect serve line. 10 min on hold no one answered.      436 pm - page out for dr Ellen Guevara (on call) via perfect serve    549 pm - 2nd attempt to page dr Ellen Guevara

## 2022-12-16 NOTE — ED NOTES
Report given to FRANKO Aiken from PCU room 2085. Report method by phone   The following was reviewed with receiving RN:   Current vital signs:  /62   Pulse 70   Temp 97.4 °F (36.3 °C) (Oral)   Resp 17   SpO2 97%                MEWS Score: 1     Any medication or safety alerts were reviewed. Any pending diagnostics and notifications were also reviewed, as well as any safety concerns or issues, abnormal labs, abnormal imaging, and abnormal assessment findings. Questions were answered.             Angela Mane RN  12/16/22 1112

## 2022-12-16 NOTE — ED NOTES
Pt  Provided with hospital bed , attached to monitor and  External  Urinary cathter      Abena Alonso RN  12/16/22 3750

## 2022-12-16 NOTE — PROGRESS NOTES
Danyelle Doshi. Stress Tech performs patient preparation of physical comfort, review test procedures, pre-stress EKG. Lung Sounds clear in all fields. Consent verified. Educated patient on test procedure and possible side effects of Lexiscan. Cardiologist reviewed pre-test EKG and is present for test. Patient tolerated test well with minor urge for BM which resolved to baseline after test with caffeine. EKG portion of test complete, Nuc Med portion pending.   Pretest VS: /63 HR 66  Post test VS: /71 HR 81

## 2022-12-16 NOTE — PROCEDURES
[address] [facility]                    [address]                              CARDIAC STRESS TEST    PATIENT NAME: Stephanie Hdez                     :        1955  MED REC NO:   [id number]                              ROOM:       08  ACCOUNT NO:   [de-identified]                           ADMIT DATE: 12/15/2022  PROVIDER:     Claudette Rod MD    DATE OF STUDY:  2022    TEST TYPE: LEXISCAN CARDIOLYTE STRESS TEST  INDICATION: CHEST PAIN  REFERRING PHYSICIAN: SATHISH VALENZUELA    RESTING HEART RATE: 66 BEATS PER MINUTE  RESTING BLOOD PRESSURE: 139/63    MEDICATION(S) GIVEN: 0.4MG IV LEXISCAN  REASON FOR TERMINATION: MEDICATION INFUSION COMPLETE    RESTING EKG: NORMAL  STRESS HEART RESPONSE: NORMAL RESPONSE  BLOOD PRESSURE RESPONSE: APPROPRIATE  STRESS EKGs: NO CHANGES SEEN, PACs, PVCs NOTED  ISCHEMIC EKG CHANGES: NONE    EKG IMPRESSION: ELECTROCARDIOGRAPHICALLY NEGATIVE LEXISCAN STRESS TEST. RADIOISOTOPE RESULTS TO FOLLOW FROM THE DEPARTMENT OF NUCLEAR MEDICINE.       Nick High MD    D: 2022 13:29:00       T: 2022 13:30:27     /MORENITA  Job#: [id number]     Doc#: Unknown    CC:    (Retain this field even if not dictated or not decipherable)

## 2022-12-16 NOTE — ED PROVIDER NOTES
[address] ED  Emergency Department Encounter  EmergencyMedicine Resident     Pt Rob Chacon  MRN: [id number]  Birthdate 1955  Date of evaluation: 12/15/22  PCP:  MARIA M Solis [id number]       Chief Complaint   Patient presents with    Chest Pain     Chest pain  on and off since 2 weeks         HISTORY OF PRESENT ILLNESS  (Location/Symptom, Timing/Onset, Context/Setting, Quality, Duration, Modifying Factors, Severity.)      Alireza Whitley is a 74 y.o. female who presents with chest pressure. Just seen at [place] today which a cardiac work-up was obtained, urinalysis CMP and lipase which showed no abnormalities. See prior labs from [place]. [facility] he also obtain CT PE scan to evaluate for pulmonary embolism which was negative it was recommended that she be admitted to observation unit for cardiology evaluation. However patient wanted to go home. She states when she went home the pain returned. No new symptoms, denying nausea vomiting or diaphoresis no abdominal pain. Per chart review no recent previous cardiac work-up. There is a stress test ordered but not performed. PAST MEDICAL / SURGICAL / SOCIAL / FAMILY HISTORY      has a past medical history of Chronic back pain, Hypertension, and Hypothyroidism. has a past surgical history that includes Spine surgery (2005); Hysterectomy (2005); Cholecystectomy; and Hip fracture surgery (Right, 06/03/2016). Social History     Socioeconomic History    Marital status:       Spouse name: Not on file    Number of children: Not on file    Years of education: Not on file    Highest education level: Not on file   Occupational History    Not on file   Tobacco Use    Smoking status: Never    Smokeless tobacco: Never   Vaping Use    Vaping Use: Never used   Substance and Sexual Activity    Alcohol use: No    Drug use: No    Sexual activity: Never   Other Topics Concern    Not on file   Social History Narrative    Not on file     Social Determinants of Health     Financial Resource Strain: Low Risk     Difficulty of Paying Living Expenses: Not hard at all   Food Insecurity: No Food Insecurity    Worried About Running Out of Food in the Last Year: Never true    Ran Out of Food in the Last Year: Never true   Transportation Needs: Not on file   Physical Activity: Not on file   Stress: Not on file   Social Connections: Not on file   Intimate Partner Violence: Not on file   Housing Stability: Not on file       Family History   Problem Relation Age of Onset    High Blood Pressure Mother     Other Mother         low heart rate     Pacemaker Mother     Heart Attack Father     Lung Cancer Father     Cancer Sister         throat cancer x2    Breast Cancer Paternal Aunt     Cancer Paternal Aunt         multiple other cancers        Allergies:  Patient has no known allergies. Home Medications:  Prior to Admission medications    Medication Sig Start Date End Date Taking? Authorizing Provider   busPIRone (BUSPAR) 10 MG tablet TAKE 1 TO 2 TABLETS BY MOUTH THREE TIMES DAILY AS NEEDED 12/13/22   MARIA M Andrews CNP   sertraline (ZOLOFT) 25 MG tablet Take 1 tablet by mouth daily 12/12/22   MARIA M Andrews CNP   Vitamin D, Ergocalciferol, 71631 units CAPS Take 50,000 Units by mouth once a week 11/22/22   MARIA M Andrews CNP   levothyroxine (SYNTHROID) 100 MCG tablet Take 1 tablet by mouth daily 9/16/22   MARIA M Andrews CNP   Melatonin 10 MG TABS Take 1 tablet by mouth nightly 8/19/22   MARIA M Andrews CNP   gabapentin (NEURONTIN) 300 MG capsule Take 300 mg by mouth in the morning and 300 mg before bedtime. Historical Provider, MD   morphine (MSIR) 15 MG tablet Take 15 mg by mouth in the morning and 15 mg before bedtime.     Historical Provider, MD   meloxicam (MOBIC) 7.5 MG tablet Take 1 tablet by mouth daily as needed for Pain 7/19/22   MARIA M Andrews CNP   atenolol (TENORMIN) 25 MG tablet Take 25 mg by mouth daily    Historical Provider, MD   Docusate Calcium (STOOL SOFTENER PO) Take 2 capsules by mouth as needed. Historical Provider, MD       REVIEW OF SYSTEMS    (2-9 systems for level 4, 10 or more for level 5)      Review of Systems   Constitutional:  Negative for activity change, chills, diaphoresis, fatigue and fever. HENT:  Negative for congestion, rhinorrhea, sinus pressure and sore throat. Eyes:  Negative for photophobia, pain and redness. Respiratory:  Positive for chest tightness. Negative for cough and shortness of breath. Cardiovascular:  Negative for chest pain and leg swelling. Gastrointestinal:  Negative for abdominal pain, constipation, diarrhea, nausea and vomiting. Genitourinary:  Negative for dysuria, flank pain, frequency and urgency. Musculoskeletal:  Negative for arthralgias, myalgias and neck stiffness. Skin:  Negative for color change, pallor and rash. Neurological:  Negative for dizziness, syncope, weakness, light-headedness, numbness and headaches. PHYSICAL EXAM   (up to 7 for level 4, 8 or more for level 5)      INITIAL VITALS:   BP (!) 144/73   Pulse 76   Temp 98.2 °F (36.8 °C) (Oral)   Resp 18   SpO2 98%     Physical Exam  Constitutional:  Well developed, Well nourished. HENT:  Normocephalic, Atraumatic, Bilateral external ears normal,  Nose normal.   Neck: Normal range of motion, No stridor. Eyes:   No discharge. Respiratory:   No respiratory distress, Normal breath sounds without any wheezing, rales or rhonchi. Cardiovascular: Normal S1, S2. No rubs, gallops or murmurs. Gastrointestinal:  No organomegaly, no tenderness, no rebound or guarding. Musculoskeletal:  No extremity deformity. Lymphatic: No lymphadenopathy noted  Skin:  Warm, Dry,  No rash. Neurologic:  Alert & oriented x 3, Normal motor function,  No focal deficits noted.    Psychiatric:  appears anxious             DIFFERENTIAL  DIAGNOSIS     PLAN (LABS / IMAGING / EKG):  Orders Placed This Encounter   Procedures    Troponin    Diet NPO    Vital signs per unit routine    Notify physician    Notify physician    Telemetry monitoring - 72 hour duration    Up with assistance    Full Code    Inpatient consult to [facility]    Inpatient consult to Cardiology    EKG 12 Lead    Place in Observation Service       MEDICATIONS ORDERED:  Orders Placed This Encounter   Medications    oxyCODONE (ROXICODONE) immediate release tablet 5 mg    sodium chloride flush 0.9 % injection 5-40 mL    sodium chloride flush 0.9 % injection 5-40 mL    0.9 % sodium chloride infusion    enoxaparin (LOVENOX) injection 40 mg     Order Specific Question:   Indication of Use     Answer:   Prophylaxis-DVT/PE    acetaminophen (TYLENOL) tablet 650 mg    OR Linked Order Group     ondansetron (ZOFRAN-ODT) disintegrating tablet 4 mg     ondansetron (ZOFRAN) injection 4 mg         DDX: ACS, musculoskeletal pain    DIAGNOSTIC RESULTS / EMERGENCY DEPARTMENT COURSE / MDM   LAB RESULTS:  Results for orders placed or performed during the hospital encounter of 12/15/22   Troponin   Result Value Ref Range    Troponin, High Sensitivity 13 0 - 14 ng/L         RADIOLOGY:  No orders to display          EKG  EKG Interpretation    Interpreted by emergency department physician    Rhythm: normal sinus   Rate: normal  Axis: normal  Ectopy: none  Conduction: normal  ST Segments: normal  T Waves: normal  Q Waves: none    Clinical Impression: no acute changes    Adam Shaw MD     All EKG's are interpreted by the Emergency Department Physician who either signs or Co-signs this chart in the absence of a cardiologist.    IMPRESSION: 29-year-old female with a history of hypertension coming in with a chest pressure sensation. Previously worked up at [facility] just prior to coming to [place]. Negative cardiac work-up. Also negative CT PE scan.     No change in the chest pain not associated with any diaphoresis no nausea or vomiting. Will obtain 1 troponin, repeat EKG. Patient wanting admission for cardiology eval in the morning    Heart score 4    EMERGENCY DEPARTMENT COURSE:  ED Course as of 12/16/22 0101   Thu Dec 15, 2022   2100 Troponin, High Sensitivity: 13  Will admit for heart score of 4. No recent cardiac work-up [QC]   2345 Spoke with Dr. Sulema Delatorre, admitting. Requesting that I consult cardiology, Dr. Joe Amos group for evaluation tomorrow [QC]   Fri Dec 16, 2022   0100 Dr. Erlin Barron in on a surgical case, will call back later [QC]      ED Course User Index  [QC] Niecy Houston MD               PROCEDURES:      CONSULTS:  IP CONSULT TO FAMILY MEDICINE  IP CONSULT TO CARDIOLOGY        FINAL IMPRESSION      1. Chest pain, unspecified type          DISPOSITION / [address] Admitted 12/15/2022 11:25:52 PM      PATIENT REFERRED TO:  No follow-up provider specified.     DISCHARGE MEDICATIONS:  New Prescriptions    No medications on file       Niecy Houston MD  Emergency Medicine Resident PGY2    (Please note that portions of thisnote were completed with a voice recognition program.  Efforts were made to edit the dictations but occasionally words are mis-transcribed.)         Niecy Houston MD  Resident  12/16/22 7383

## 2022-12-16 NOTE — PROGRESS NOTES
Pt brought to the stress lab from the ED by this RN. Pt 1 week post left knee replacement. Pt transferred from bed to wheel chair with assist, tolerated well, bore weight on knee. Pt tolerated stress test well, denied symptoms of lexiscan. This RN transported pt to [address] [place] [address], pt again demonstrated ability to bear wt on left knee. Pt accompanied by her sister at this time.

## 2022-12-16 NOTE — PROGRESS NOTES
Message out to dr Rosa Antunez via perfect serve regarding diet order and pain in rib cage. 556 pm - dr Parish Penaloza on call.  New order for cardiac diet and lidocaine patch x2, one for each side of rib cage

## 2022-12-17 VITALS
TEMPERATURE: 99.5 F | OXYGEN SATURATION: 100 % | HEART RATE: 63 BPM | RESPIRATION RATE: 16 BRPM | DIASTOLIC BLOOD PRESSURE: 65 MMHG | SYSTOLIC BLOOD PRESSURE: 123 MMHG

## 2022-12-17 LAB
ANION GAP SERPL CALCULATED.3IONS-SCNC: 13 MMOL/L (ref 9–17)
BUN BLDV-MCNC: 13 MG/DL (ref 8–23)
CALCIUM SERPL-MCNC: 8.7 MG/DL (ref 8.6–10.4)
CHLORIDE BLD-SCNC: 98 MMOL/L (ref 98–107)
CO2: 28 MMOL/L (ref 20–31)
CREAT SERPL-MCNC: 0.93 MG/DL (ref 0.5–0.9)
EKG ATRIAL RATE: 78 BPM
EKG P AXIS: 25 DEGREES
EKG P-R INTERVAL: 184 MS
EKG Q-T INTERVAL: 388 MS
EKG QRS DURATION: 76 MS
EKG QTC CALCULATION (BAZETT): 442 MS
EKG R AXIS: 29 DEGREES
EKG T AXIS: 6 DEGREES
EKG VENTRICULAR RATE: 78 BPM
GFR SERPL CREATININE-BSD FRML MDRD: >60 ML/MIN/1.73M2
GLUCOSE BLD-MCNC: 85 MG/DL (ref 70–99)
MAGNESIUM: 2 MG/DL (ref 1.6–2.6)
POTASSIUM SERPL-SCNC: 3.3 MMOL/L (ref 3.7–5.3)
SODIUM BLD-SCNC: 139 MMOL/L (ref 135–144)

## 2022-12-17 PROCEDURE — 36415 COLL VENOUS BLD VENIPUNCTURE: CPT

## 2022-12-17 PROCEDURE — G0378 HOSPITAL OBSERVATION PER HR: HCPCS

## 2022-12-17 PROCEDURE — 80048 BASIC METABOLIC PNL TOTAL CA: CPT

## 2022-12-17 PROCEDURE — 6370000000 HC RX 637 (ALT 250 FOR IP): Performed by: FAMILY MEDICINE

## 2022-12-17 PROCEDURE — 6360000002 HC RX W HCPCS: Performed by: FAMILY MEDICINE

## 2022-12-17 PROCEDURE — 93010 ELECTROCARDIOGRAM REPORT: CPT | Performed by: INTERNAL MEDICINE

## 2022-12-17 PROCEDURE — 96372 THER/PROPH/DIAG INJ SC/IM: CPT

## 2022-12-17 PROCEDURE — 83735 ASSAY OF MAGNESIUM: CPT

## 2022-12-17 PROCEDURE — 2580000003 HC RX 258: Performed by: FAMILY MEDICINE

## 2022-12-17 RX ORDER — POTASSIUM CHLORIDE 7.45 MG/ML
10 INJECTION INTRAVENOUS PRN
Status: DISCONTINUED | OUTPATIENT
Start: 2022-12-17 | End: 2022-12-17 | Stop reason: HOSPADM

## 2022-12-17 RX ORDER — POTASSIUM CHLORIDE 20 MEQ/1
40 TABLET, EXTENDED RELEASE ORAL PRN
Status: DISCONTINUED | OUTPATIENT
Start: 2022-12-17 | End: 2022-12-17 | Stop reason: HOSPADM

## 2022-12-17 RX ADMIN — LEVOTHYROXINE SODIUM 100 MCG: 0.1 TABLET ORAL at 05:16

## 2022-12-17 RX ADMIN — POTASSIUM CHLORIDE 40 MEQ: 1500 TABLET, EXTENDED RELEASE ORAL at 10:42

## 2022-12-17 RX ADMIN — SODIUM CHLORIDE, PRESERVATIVE FREE 10 ML: 5 INJECTION INTRAVENOUS at 10:41

## 2022-12-17 RX ADMIN — SERTRALINE HYDROCHLORIDE 25 MG: 50 TABLET ORAL at 10:39

## 2022-12-17 RX ADMIN — ENOXAPARIN SODIUM 40 MG: 100 INJECTION SUBCUTANEOUS at 10:40

## 2022-12-17 RX ADMIN — GABAPENTIN 300 MG: 300 CAPSULE ORAL at 10:39

## 2022-12-17 RX ADMIN — ATENOLOL 25 MG: 25 TABLET ORAL at 10:38

## 2022-12-17 RX ADMIN — OXYCODONE HYDROCHLORIDE AND ACETAMINOPHEN 1 TABLET: 5; 325 TABLET ORAL at 05:15

## 2022-12-17 ASSESSMENT — PAIN SCALES - GENERAL: PAINLEVEL_OUTOF10: 9

## 2022-12-17 ASSESSMENT — PAIN DESCRIPTION - DESCRIPTORS: DESCRIPTORS: CRAMPING

## 2022-12-17 ASSESSMENT — PAIN DESCRIPTION - LOCATION: LOCATION: LEG

## 2022-12-17 ASSESSMENT — PAIN DESCRIPTION - ORIENTATION: ORIENTATION: RIGHT

## 2022-12-17 NOTE — PROGRESS NOTES
Pt arrived to room 2068. VSS. Pt complaining of muscle cramps and pain. Telemetry applied. Standard safety measures in place.  Assessment complete

## 2022-12-17 NOTE — PROGRESS NOTES
Writer spoke with Dr. Shayy Choe, he had removed patient from list as her stress was negative, and he did not realize patient had discharged and returned with chest pain. Patient denies chest pain, chest cramping that occurs bilaterally on right and left rib cage infrequently. Lidoderm patches applied, pain improved. Dr. Tong Villar notified, patient asking to discharge. Patient cleared for discharge.

## 2022-12-17 NOTE — DISCHARGE INSTRUCTIONS
Your information:  Name: Karen Hale  : 1955    Your instructions:    ***    What to do after you leave the hospital:    Recommended diet: {diet:05243}    Recommended activity: activity as tolerated        The following personal items were collected during your admission and were returned to you:    Belongings  Dental Appliances: None  Vision - Corrective Lenses: Eyeglasses  Hearing Aid: None  Clothing: Socks, Slippers, Shirt, Pants, Undergarments  Jewelry: None  Body Piercings Removed: N/A  Electronic Devices: Cell Phone,   Weapons (Notify Protective Services/Security): None  Other Valuables: Purse, Wallet, Keys  Home Medications: None  Valuables Given To: Patient  Provide Name(s) of Who Valuable(s) Were Given To: self  Responsible person(s) in the waiting room: self  Patient approves for provider to speak to responsible person post operatively: Yes    Information obtained by:  By signing below, I understand that if any problems occur once I leave the hospital I am to contact Ele Puentes. I understand and acknowledge receipt of the instructions indicated above.

## 2022-12-17 NOTE — H&P
Family Medicine Admit Note    PCP: MARIA M Alvarez CNP    Date of Admission: 12/15/2022    Date of Service: Pt seen/examined on 12/16/22 and Admitted to Inpatient. Chief Complaint:  Chest Pain      History Of Present Illness: The patient is a 74 y.o. female who presents to [facility] with complaints of chest pressure. She was just seen in [facility] yesterday and there was a cardiac work up, labs and urinalysis obtained with no abnormalities seen. It was recommended that she stay at [facility] for further cardiac evaluation. She wanted to go home and so she did. However, the pain returned once she was back at home. She denies any fevers, chills, cough, congestion, abdominal pain, N/V/D, dysuria, headaches, dizziness or confusion. Past Medical History:        Diagnosis Date    Chronic back pain     S/P L4/L5 OR    Hypertension     Hypothyroidism        Past Surgical History:        Procedure Laterality Date    CHOLECYSTECTOMY      HIP FRACTURE SURGERY Right 06/03/2016    closed reduction percutaneous pinning    HYSTERECTOMY (CERVIX STATUS UNKNOWN)  2005    SPINE SURGERY  2005    L4/L5       Medications Prior to Admission:    Prior to Admission medications    Medication Sig Start Date End Date Taking? Authorizing Provider   morphine (MS CONTIN) 15 MG extended release tablet Take 15 mg by mouth 2 times daily. Yes Historical Provider, MD   oxyCODONE-acetaminophen (PERCOCET) 5-325 MG per tablet Take 1 tablet by mouth every 6 hours as needed for Pain.    Yes Historical Provider, MD   docusate sodium (COLACE) 100 MG capsule Take 200 mg by mouth daily   Yes Historical Provider, MD   busPIRone (BUSPAR) 10 MG tablet TAKE 1 TO 2 TABLETS BY MOUTH THREE TIMES DAILY AS NEEDED 12/13/22   MARIA M Alvarez CNP   sertraline (ZOLOFT) 25 MG tablet Take 1 tablet by mouth daily 12/12/22   MARIA M Alvarez CNP   Vitamin D, Ergocalciferol, 64046 units CAPS Take 50,000 Units by mouth once a week 11/22/22   Isidoro Landeros SIDDHARTHA ArnettN - CNP   levothyroxine (SYNTHROID) 100 MCG tablet Take 1 tablet by mouth daily 9/16/22   Roosevelt Al APRN - CNP   Melatonin 10 MG TABS Take 1 tablet by mouth nightly  Patient not taking: Reported on 12/16/2022 8/19/22   Roosevelt Al, APRN - CNP   gabapentin (NEURONTIN) 300 MG capsule Take 300 mg by mouth in the morning and 300 mg before bedtime. Historical Provider, MD   meloxicam (MOBIC) 7.5 MG tablet Take 1 tablet by mouth daily as needed for Pain 7/19/22   Roosevelt Al, APRN - CNP   atenolol (TENORMIN) 25 MG tablet Take 25 mg by mouth daily    Historical Provider, MD       Allergies:  Patient has no known allergies. Social History:  The patient currently lives at home    TOBACCO:   reports that she has never smoked. She has never used smokeless tobacco.  ETOH:   reports no history of alcohol use. Review of Systems - Endocrine ROS: positive for - hypothyroid  Respiratory ROS: positive for - shortness of breath  Cardiovascular ROS: positive for - chest pain  Musculoskeletal ROS: positive for - muscle pain      Family History:          Problem Relation Age of Onset    High Blood Pressure Mother     Other Mother         low heart rate     Pacemaker Mother     Heart Attack Father     Lung Cancer Father     Cancer Sister         throat cancer x2    Breast Cancer Paternal Aunt     Cancer Paternal Aunt         multiple other cancers        PHYSICAL EXAM:    BP (!) 110/54   Pulse 55   Temp 98.1 °F (36.7 °C)   Resp 18   SpO2 90%     General appearance: No apparent distress appears stated age and cooperative. HEENT Normal cephalic, atraumatic without obvious deformity. Pupils equal, round, and reactive to light. Extra ocular muscles intact. Conjunctivae/corneas clear. Neck: Supple, No jugular venous distention/bruits. Trachea midline without thyromegaly or adenopathy with full range of motion.   Lungs: Clear to auscultation, bilaterally without Rales/Wheezes/Rhonchi with good respiratory effort. Heart: Regular rate and rhythm with Normal S1/S2 without murmurs, rubs or gallops, point of maximum impulse non-displaced  Abdomen: Soft, non-tender or non-distended without rigidity or guarding and positive bowel sounds all four quadrants. Extremities: No clubbing, cyanosis, or edema bilaterally. Full range of motion without deformity and normal gait intact. Skin: Skin color, texture, turgor normal.  No rashes or lesions. Neurologic: Alert and oriented X 3, neurovascularly intact with sensory/motor intact upper extremities/lower extremities, bilaterally. Cranial nerves: II-XII intact, grossly non-focal.  Mental status: Alert, oriented, thought content appropriate. CXR:  I have reviewed the CXR with the following interpretation: not done  EKG:  I have reviewed the EKG with the following interpretation: NSR    CBC   Recent Labs     12/15/22  1254   WBC 5.9   HGB 11.4*   HCT 37.4         RENAL  Recent Labs     12/15/22  1254      K 3.3*   CL 99   CO2 29   BUN 8   CREATININE 0.96*     LFT'S  Recent Labs     12/15/22  1254   AST 32*   ALT 8   BILITOT 1.5*   ALKPHOS 88     COAG  No results for input(s): INR in the last 72 hours. CARDIAC ENZYMES  No results for input(s): CKTOTAL, CKMB, CKMBINDEX, TROPONINI in the last 72 hours.     U/A:    Lab Results   Component Value Date/Time    COLORU Yellow 12/15/2022 03:32 PM    WBCUA 2 TO 5 09/01/2022 03:21 PM    RBCUA None 09/01/2022 03:21 PM    MUCUS NOT REPORTED 06/02/2016 09:11 PM    BACTERIA None 07/06/2022 12:45 PM    SPECGRAV 1.005 12/15/2022 03:32 PM    LEUKOCYTESUR NEGATIVE 12/15/2022 03:32 PM    GLUCOSEU NEGATIVE 12/15/2022 03:32 PM    AMORPHOUS NOT REPORTED 06/02/2016 09:11 PM       ABG  No results found for: Mariah Mckeon        Active Hospital Problems    Diagnosis Date Noted    Chest pain [R07.9] 12/15/2022     Priority: Medium    Hypokalemia [E87.6] 08/12/2022     Priority: Medium Acquired hypothyroidism [E03.9]     Chronic back pain [M54.9, G89.29]          ASSESSMENT/PLAN:  Patient admitted with Atypical Chest Pain. Co-morbidities as above. -Chest Pain - Cardiology consulted, stress test to be obtained. -Hypokalemia - being replaced.  -Chronic back pain - Percocet from home meds restarted.  -Home medications reviewed & reconciled.  -Complete orders per chart. DVT Prophylaxis:   Diet: ADULT DIET;  Regular; Low Fat/Low Chol/High Fiber/ARANZA  Code Status: Full Code      Dispo - admitted to [facility]       @[facility]@

## 2022-12-17 NOTE — PROGRESS NOTES
Progress Note  Date:2022       Room:[facility]  Patient Jason Lorenzana     YOB: 1955     Age:67 y.o. Subjective    Subjective:  Symptoms:  Resolved. Diet:  Adequate intake. Activity level: Returning to normal.    Pain:  She complains of pain that is mild (bilateral rib pain - lidocaine patch started). Review of Systems  Objective         Vitals Last 24 Hours:  TEMPERATURE:  Temp  Av.8 °F (36.6 °C)  Min: 97.4 °F (36.3 °C)  Max: 98.3 °F (36.8 °C)  RESPIRATIONS RANGE: Resp  Av.6  Min: 15  Max: 20  PULSE OXIMETRY RANGE: SpO2  Av.6 %  Min: 90 %  Max: 100 %  PULSE RANGE: Pulse  Av.9  Min: 55  Max: 70  BLOOD PRESSURE RANGE: Systolic (60VXH), FOS:125 , Min:110 , NLJ:762   ; Diastolic (01CKV), MRK:79, Min:54, Max:66    I/O (24Hr): Intake/Output Summary (Last 24 hours) at 2022 1117  Last data filed at 2022 1708  Gross per 24 hour   Intake --   Output 200 ml   Net -200 ml     Objective:  General Appearance:  Comfortable. Vital signs: (most recent): Blood pressure 111/66, pulse 64, temperature 98.3 °F (36.8 °C), temperature source Oral, resp. rate 18, SpO2 92 %. Vital signs are normal.    Lungs:  Normal effort and normal respiratory rate. Breath sounds clear to auscultation. Heart: Normal rate. Regular rhythm. S1 normal and S2 normal.    Labs/Imaging/Diagnostics    Labs:  CBC:  Recent Labs     12/15/22  1254   WBC 5.9   RBC 3.69*   HGB 11.4*   HCT 37.4   .4   RDW 15.2*        CHEMISTRIES:  Recent Labs     12/15/22  1254 22  0536    139   K 3.3* 3.3*   CL 99 98   CO2 29 28   BUN 8 13   CREATININE 0.96* 0.93*   GLUCOSE 99 85   MG  --  2.0     PT/INR:No results for input(s): PROTIME, INR in the last 72 hours. APTT:No results for input(s): APTT in the last 72 hours.   LIVER PROFILE:  Recent Labs     12/15/22  1254   AST 32*   ALT 8   BILITOT 1.5*   ALKPHOS 88       Imaging Last 24 Hours:  CT CHEST PULMONARY EMBOLISM W CONTRAST    Result Date: 12/15/2022  EXAMINATION: CTA OF THE CHEST 12/15/2022 2:06 pm TECHNIQUE: CTA of the chest was performed after the administration of 75 mL Isovue 370 intravenous contrast.  Multiplanar reformatted images are provided for review. MIP images are provided for review. Automated exposure control, iterative reconstruction, and/or weight based adjustment of the mA/kV was utilized to reduce the radiation dose to as low as reasonably achievable. COMPARISON: CT on 07/16/2022 HISTORY: Acute chest pain. Recent knee surgery. FINDINGS: Image quality degraded by motion artifact. Pulmonary Arteries: Pulmonary arteries are adequately opacified for evaluation. No intraluminal filling defect to suggest pulmonary embolism. Main pulmonary artery remains enlarged, measuring 3.5 cm in caliber. Mediastinum: Borderline cardiomegaly. No pericardial effusion. Thoracic aorta is normal caliber. No lymphadenopathy. Thyroid is not visualized. Esophagus is unremarkable. Lungs/pleura: Lungs are clear. No pleural effusion or pneumothorax. Upper Abdomen: Cholecystectomy. Pneumobilia. Fatty atrophy of the pancreas. Soft Tissues/Bones: Unremarkable. No pulmonary embolism or acute pulmonary abnormality. Enlarged main pulmonary artery could be due to pulmonary hypertension.      VL DUP LOWER EXTREMITY VENOUS RIGHT    Result Date: 12/16/2022    [facility]  Vascular Lower Extremities DVT Study Procedure   Patient Name [facility]       Date of Study         12/15/2022               Junior Nephwinnie   Date of      1955  Gender                Female  Birth   Age          74 year(s)  Race                     Room Number  37   Corporate ID [id number]  #   Patient Chuck [de-identified]  #   MR #         [id number]     Karyn Gill RVT   Accession #  [id number]  Interpreting          [facility]                           Physician   Referring                Referring Physician   Marek Braden [facility]  Practitioner  Procedure Type of Study:   Veins: Lower Extremities DVT Study, Venous Scan Lower Right. Patient Status:ER. Technical Quality:Limited visualization. Conclusions   Summary   No evidence of superficial or deep venous thrombosis in the right lower  extremity. Signature   ----------------------------------------------------------------  Electronically signed by Lena Waller RVT(Sonographer) on  12/15/2022 02:07 PM  ----------------------------------------------------------------   ----------------------------------------------------------------  Electronically signed by Ceil Hilding Reyes,Arthur(Interpreting  physician) on 12/16/2022 06:09 AM  ----------------------------------------------------------------  Findings:   Right Impression:                         Left Impression:  Cluster of varicose veins in the proximal The common femoral vein  calf that are noncompressible. demonstrate normal                                            compressibility and  The common femoral, femoral, popliteal    augmentation. and tibial veins demonstrate normal  compressibility and augmentation. Normal compressibility of the great  saphenous vein. Normal compressibility of the small  saphenous vein. Incidental finding of an avascular  structure in the popliteal fossa  measuring 0.75 cm x 3.34 cm. Velocities are measured in cm/s ; Diameters are measured in cm Right Lower Extremities DVT Study Measurements Right 2D Measurements +------------------------------------+----------+---------------+----------+ ! Location                            ! Visualized! Compressibility! Thrombosis! +------------------------------------+----------+---------------+----------+ ! Common Femoral                      !Yes       ! Yes            ! None      ! +------------------------------------+----------+---------------+----------+ ! Prox Femoral !Yes       !Yes            ! None      ! +------------------------------------+----------+---------------+----------+ ! Mid Femoral                         !Yes       ! Yes            ! None      ! +------------------------------------+----------+---------------+----------+ ! Dist Femoral                        !Yes       ! Yes            ! None      ! +------------------------------------+----------+---------------+----------+ ! Popliteal                           !Yes       ! Yes            ! None      ! +------------------------------------+----------+---------------+----------+ ! Sapheno Femoral Junction            ! Yes       ! Yes            ! None      ! +------------------------------------+----------+---------------+----------+ ! PTV                                 ! Partial   !Yes            ! None      ! +------------------------------------+----------+---------------+----------+ ! Peroneal                            !Partial   !Yes            ! None      ! +------------------------------------+----------+---------------+----------+ ! Gastroc                             ! Yes       ! Yes            ! None      ! +------------------------------------+----------+---------------+----------+ ! GSV Thigh                           ! Yes       ! Yes            ! None      ! +------------------------------------+----------+---------------+----------+ ! GSV Knee                            ! Yes       ! Yes            ! None      ! +------------------------------------+----------+---------------+----------+ ! GSV Ankle                           ! Yes       ! Yes            ! None      ! +------------------------------------+----------+---------------+----------+ ! SSV                                 ! Yes       ! Yes            ! None      ! +------------------------------------+----------+---------------+----------+ Right Doppler Measurements +---------------------------+------+------+--------------------------------+ ! Location !Signal!Reflux! Reflux (msec)                   ! +---------------------------+------+------+--------------------------------+ ! Common Femoral             !Phasic!      !                                ! +---------------------------+------+------+--------------------------------+ ! Prox Femoral               !Phasic!      !                                ! +---------------------------+------+------+--------------------------------+ ! Popliteal                  !Phasic!      !                                ! +---------------------------+------+------+--------------------------------+ Left Lower Extremities DVT Study Measurements Left 2D Measurements +------------------------------------+----------+---------------+----------+ ! Location                            ! Visualized! Compressibility! Thrombosis! +------------------------------------+----------+---------------+----------+ ! Common Femoral                      !Yes       ! Yes            ! None      ! +------------------------------------+----------+---------------+----------+ Left Doppler Measurements +----------------------------+------+------+-------------------------------+ ! Location                    ! Signal!Reflux! Reflux (msec)                  ! +----------------------------+------+------+-------------------------------+ ! Common Femoral              !Phasic!      !                               ! +----------------------------+------+------+-------------------------------+    NM Cardiac Stress Test Nuclear Imaging    Result Date: 12/16/2022  EXAMINATION: MYOCARDIAL PERFUSION IMAGING 12/16/2022 11:35 am TECHNIQUE: Rest dose:  32.2 mCi Tc-99m sestamibi intravenously Stress dose:  15 mCi Tc-99m sestamibi intravenously Under cardiology supervision, 0.4 mg Lexiscan was infused intravenously prior to injection of the stress dose. SPECT imaging was acquired following injection of the sestamibi. ECG gating was obtained following the stress acquisition.  COMPARISON: None Available. HISTORY: ORDERING SYSTEM PROVIDED HISTORY: Chest pain TECHNOLOGIST PROVIDED HISTORY: Reason for Exam: Chest pain Procedure Type->Rx Reason for Exam: CP 72-year-old female with chest pain FINDINGS: The patient achieved a maximum heart rate of 136 beats per minute, 87 % of the maximum age predicted heart rate of 155 beats per minute. Perfusion: Exam is limited in the absence of prone imaging. Patient was unable to perform prone imaging. There is no scintigraphic evidence for a reversible or fixed perfusion defect to suggest reversible ischemia or infarct. Function: The gated SPECT data demonstrates normal left ventricular size and normal wall motion. Left ventricular ejection fraction:  Greater than 70% TID score:  0.91 (threshold value of 1.39 is used for Lexiscan stress with Tc-99m). There is no stress-induced cavitary dilatation to suggest compensated triple vessel disease. End diastolic volume:  08CL Scores are visually adjusted to account for potential artifact. Summed stress score:  0 Summed rest score:  0 Summed reversibility score:  0     1. Please note exam is limited in the absence of prone imaging. No definitive scintigraphic evidence for reversible ischemia or infarct. 2. Left ventricular ejection fracture of greater than 70%. 3.  Please see report for EKG portion of the examination which will be performed separately by physician from cardiology. Risk stratification:  Low risk Note:  Risk stratification incorporates both clinical history and test results. Final risk determination is the responsibility of the ordering provider as history and other test results may increase or decrease the risk stratification reported for this examination. Risk stratification criteria are adapted from \"Noninvasive Risk Stratification\" criteria from [facility].   Al, ACC/AATS/AHA/ASE/ASNC/SCAI/SCCT/STS 2017 Appropriate Use Criteria For Coronary Revascularization in Patients With Stable Ischemic Heart Disease [facility] Volume 69, Issue 17, May 2017 High risk (>3% annual death or MI) 1. Severe resting LV dysfunction (LVEF >35%) not readily explained by non coronary causes 2. Resting perfusion abnormalities greater than 10% of the myocardium in patients without prior history or evidence of MI 3. Stress-induced perfusion abnormalities encumbering greater than or equal to 10% myocardium or stress segmental scores indicating multiple vascular territories with abnormalities 4. Stress-induced LV dilatation (TID ratio greater than 1.19 for exercise and greater than 1.39 for regadenoson) Intermediate risk (1% to 3% annual death or MI) 1. Mild/moderate resting LV dysfunction (LVEF 35% to 49%) not readily explained by non coronary causes. 2.  Resting perfusion abnormalities in 5%-9.9% of the myocardium in patients without a history or prior evidence of MI 3. Stress-induced perfusion abnormality encumbering 5%-9.9% of the myocardium or stress segmental scores indicating 1 vascular territory with abnormalities but without LV dilation 4. Small wall motion abnormality involving 1-2 segments and only 1 coronary bed. Low Risk (Less than 1% annual death or MI) 1. Normal or small myocardial perfusion defect at rest or with stress encumbering less than 5% of the myocardium. Assessment//Plan           Hospital Problems             Last Modified POA    * (Principal) Chest pain 12/15/2022 Yes    Hypokalemia 12/16/2022 Yes    Chronic back pain 12/16/2022 Yes    Acquired hypothyroidism 12/16/2022 Yes     Assessment & Plan    Low risk on stress imaging    D/c if cardiology agrees.        Electronically signed by Opal Hester MD on 12/17/22 at 11:17 AM EST

## 2022-12-17 NOTE — PROGRESS NOTES
Writer notified Dr. Inna Henry that pt is complaining of cramps and pain from her ribs down to her legs. Most recent labs on 12/15 at [facility]. V's showed a potassium of 3.3. Per the chart it does not seem to have been replaced and pt does not currently have labs ordered. Order received for potassium replacement protocol and BMP in the morning.

## 2022-12-19 ENCOUNTER — CARE COORDINATION (OUTPATIENT)
Dept: CASE MANAGEMENT | Age: 67
End: 2022-12-19

## 2022-12-19 DIAGNOSIS — R07.9 CHEST PAIN, UNSPECIFIED TYPE: Primary | ICD-10-CM

## 2022-12-19 LAB
EKG ATRIAL RATE: 73 BPM
EKG P AXIS: -12 DEGREES
EKG P-R INTERVAL: 182 MS
EKG Q-T INTERVAL: 386 MS
EKG QRS DURATION: 66 MS
EKG QTC CALCULATION (BAZETT): 425 MS
EKG VENTRICULAR RATE: 73 BPM

## 2022-12-19 PROCEDURE — 1111F DSCHRG MED/CURRENT MED MERGE: CPT | Performed by: NURSE PRACTITIONER

## 2022-12-19 NOTE — CARE COORDINATION
[facility] Care Transitions Initial Follow Up Call    Call within 2 business days of discharge: Yes    Care Transition Nurse contacted the patient by telephone to perform post hospital discharge assessment. Verified name and  with patient as identifiers. Provided introduction to self, and explanation of the Care Transition Nurse role. Patient: Kaitlin Barraza Patient : 1955   MRN: [id number]  Reason for Admission: Chest pain  Discharge Date: 22 RARS: Readmission Risk Score: 14.5      Last Discharge  [address]       Date Complaint Diagnosis Description Type Department Provider    12/15/22 Chest Pain Chest pain, unspecified type . .. ED to Hosp-Admission (Discharged) (ADMITTED) [address] Jamari Fernandez MD; Vibha Johnson. ..    12/15/22 Shortness of Breath; Chest Pain Chest pain, unspecified type ED (AMA) [facility] Guera Villasenor MD; [facility],... Was this an external facility discharge? No Discharge Facility: [address]  to be reviewed by the provider   Additional needs identified to be addressed with provider: Yes  7 day hospital follow up               Method of communication with provider: staff message. Was able to contact Tristen Guillen for initial transitional outreach. She stated that she was \"hanging in there\". She said that she continues with the chest wall muscle cramping. She said that she was not eating much, when reviewed high potassium foods. Also reviewed heat application and electrolyte drinks. She said that she just had a recent knee replacement. She stated that she had all her medications, and was holding the MS Contin until she is finished with the Percocet that she received post operatively. She is seeing pain management and ortho this Thursday. She will not be seeing PCP until January and was receptive to have writer send message to office for sooner appointment. She is aware of pending BMP tomorrow. No questions or concerns.     Care Transition Nurse reviewed discharge instructions with patient who verbalized understanding. The patient was given an opportunity to ask questions and does not have any further questions or concerns at this time. Were discharge instructions available to patient? Yes. Reviewed appropriate site of care based on symptoms and resources available to patient including: PCP  Specialist  When to call 911. The patient agrees to contact the PCP office for questions related to their healthcare. Advance Care Planning:   Does patient have an Advance Directive: patient declined education. Medication reconciliation was performed with patient, who verbalizes understanding of administration of home medications. Medications reviewed, 1111F entered: yes    Was patient discharged with a pulse oximeter? no    Non-face-to-face services provided:  Obtained and reviewed discharge summary and/or continuity of care documents  Reviewed and followed up on pending diagnostic tests and treatments-[facility] 12/20/22    Offered patient enrollment in the Remote Patient Monitoring (RPM) program for in-home monitoring: Patient is not eligible for RPM program.    Care Transitions 24 Hour Call    Do you have a copy of your discharge instructions?: Yes  Do you have all of your prescriptions and are they filled?: Yes  Have you been contacted by a [facility]?: No  Have you scheduled your follow up appointment?: No  Do you feel like you have everything you need to keep you well at home?: Yes  Care Transitions Interventions         Follow Up  Future Appointments   Date Time Provider Leona Phan   1/9/2023 11:30 AM MARIA M Nagy - ROBIN [facility]   2/24/2023 10:00 AM Yobany Osman, [facility] Transition Nurse provided contact information. Plan for follow-up call in 3-5 days based on severity of symptoms and risk factors.   Plan for next call: symptom management-follow up on chest wall muscle cramps, lab work PCP appointment if made sooner.     Karly Natarajan, RN

## 2022-12-20 ENCOUNTER — HOSPITAL ENCOUNTER (OUTPATIENT)
Age: 67
Setting detail: SPECIMEN
Discharge: HOME | End: 2022-12-20

## 2022-12-20 DIAGNOSIS — E87.6 HYPOKALEMIA: ICD-10-CM

## 2022-12-21 LAB
ANION GAP SERPL CALCULATED.3IONS-SCNC: 15 MMOL/L (ref 9–17)
BUN BLDV-MCNC: 12 MG/DL (ref 8–23)
CALCIUM SERPL-MCNC: 8.5 MG/DL (ref 8.6–10.4)
CHLORIDE BLD-SCNC: 102 MMOL/L (ref 98–107)
CO2: 27 MMOL/L (ref 20–31)
CREAT SERPL-MCNC: 0.9 MG/DL (ref 0.5–0.9)
GFR SERPL CREATININE-BSD FRML MDRD: >60 ML/MIN/1.73M2
GLUCOSE BLD-MCNC: 88 MG/DL (ref 70–99)
POTASSIUM SERPL-SCNC: 3.7 MMOL/L (ref 3.7–5.3)
SODIUM BLD-SCNC: 144 MMOL/L (ref 135–144)

## 2022-12-23 ENCOUNTER — CARE COORDINATION (OUTPATIENT)
Dept: CASE MANAGEMENT | Age: 67
End: 2022-12-23

## 2022-12-23 NOTE — CARE COORDINATION
[facility] Care Transitions Follow Up Call    Care Transition Nurse contacted the patient by telephone to follow up after admission on 12/15/22. Verified name and  with patient as identifiers. Patient: Thompson Kennedy  Patient : 1955   MRN: [id number]  Reason for Admission: Chest pain  Discharge Date: 22 RARS: Readmission Risk Score: 14.5      Needs to be reviewed by the provider   Additional needs identified to be addressed with provider: No  none             Method of communication with provider: none. Was able to contact [facility] for transitional outreach. She stated that she was her knee was throbbing. She did see her orthopedic yesterday and he told her that the pain and the redness at her incision was normal.  She said that she is taking the pain medication, but it does not help. Reviewed ortho noted of elevating and icing the area and she said that she does not like to ice it because it makes the pain worse. She said that she does have tingling in the toes, but no discoloration. She denied any chest pain or muscle cramping. She has a PCP appointment . No questions or concerns. Addressed changes since last contact:  none  Discussed follow-up appointments. If no appointment was previously scheduled, appointment scheduling offered: No.   Is follow up appointment scheduled within 7 days of discharge? Yes. Follow Up  Future Appointments   Date Time Provider Leona Phan   2022 11:00 AM [facility]   2023 11:30 [facility]   2023 10:00 AM Timmy Shutter, APRN - CNP [facility] follow up appointment(s):     Care Transition Nurse reviewed medical action plan with patient and discussed any barriers to care and/or understanding of plan of care after discharge.  Discussed appropriate site of care based on symptoms and resources available to patient including: PCP  Specialist  When to call 911. The patient agrees to contact the PCP office for questions related to their healthcare. Patients top risk factors for readmission: functional physical ability and medical condition-Total knee replacement/chest pain  Interventions to address risk factors: Obtained and reviewed discharge summary and/or continuity of care documents    Offered patient enrollment in the Remote Patient Monitoring (RPM) program for in-home monitoring: Patient is not eligible for RPM program.     Care Transitions Subsequent and Final Call    Subsequent and Final Calls  Do you have any ongoing symptoms?: Yes  Onset of Patient-reported symptoms: In the past 7 days  Patient-reported symptoms: Pain  Have your medications changed?: No  Do you have any questions related to your medications?: No  Do you currently have any active services?: No  Do you have any needs or concerns that I can assist you with?: No  Care Transitions Interventions  Other Interventions:             Care Transition Nurse provided contact information for future needs. Plan for follow-up call in 7-10 days based on severity of symptoms and risk factors. Plan for next call: symptom management-follow up on chest wall pain/cramping, pain   from Lt knee replacement.     George Natarajan RN

## 2022-12-29 ENCOUNTER — CARE COORDINATION (OUTPATIENT)
Dept: CASE MANAGEMENT | Age: 67
End: 2022-12-29

## 2022-12-29 NOTE — CARE COORDINATION
[facility] Care Transitions Follow Up Call    Care Transition Nurse contacted the patient by telephone to follow up after admission. Verified name and  with patient as identifiers. Patient: Alireza Whitley  Patient : 1955   MRN: [id number]  Reason for Admission:22- S/P Total Knee Replacement, 12/15/22-Chest Pain  Discharge Date: 22 RARS: Readmission Risk Score: 14.5      Needs to be reviewed by the provider   Additional needs identified to be addressed with provider: No  Going to see Ortho surgeon 22-S/S  post op infection. Method of communication with provider:  Patient called office, PCP sent Orthopedic surgeon's office pictures of knee . Doc Craig states she is not doing well. \"My knee is infected. \"  Reviewed PCP's 22 follow up office note prior to call and states that pictures of knee were sent to Dr. Hermelinda German office. The PCP gave her a 100 mg Vibratab. Purulent drainage is active. Addressed changes since last contact:   see note  Discussed follow-up appointments. If no appointment was previously scheduled, appointment scheduling offered: see note. Is follow up appointment scheduled within 7 days of discharge? Yes. Follow Up  Future Appointments   Date Time Provider Leona Phan   2023 11:30 AM Fleta Ginette, APRN - CNP [place] [facility]   2023 10:00 AM MARIA M Solis CNP [facility] follow up appointment(s): Georgi-22    Care Transition Nurse reviewed red flags with patient and discussed any barriers to care and/or understanding of plan of care after discharge. Discussed appropriate site of care based on symptoms and resources available to patient including: PCP  Specialist  When to call 911. The patient agrees to contact the PCP office for questions related to their healthcare. Advance Care Planning:   Did not address .      Patients top risk factors for readmission: medical condition-post op infection  Interventions to address risk factors:  Patient addressed-will see physician in office tomorrow. Offered patient enrollment in the Remote Patient Monitoring (RPM) program for in-home monitoring: Patient is not eligible for RPM program.    Care Transition Nurse provided contact information for future needs. Plan for follow-up call in 3-5 days based on severity of symptoms and risk factors. Plan for next call: self management-assess  follow-up appointment-orders? Plan?     Nolvia Browning RN

## 2023-01-04 ENCOUNTER — CARE COORDINATION (OUTPATIENT)
Dept: CASE MANAGEMENT | Age: 68
End: 2023-01-04

## 2023-01-04 NOTE — CARE COORDINATION
[address] Care Transitions Follow Up Call    Care Transition Nurse contacted the patient by telephone to follow up after admission on 12/15/22. Verified name and  with patient as identifiers. Patient: Scooby Carrera  Patient : 1955   MRN: [id number]  Reason for Admission: Chest pain  Discharge Date: 22 RARS: Readmission Risk Score: 14.5      Needs to be reviewed by the provider   Additional needs identified to be addressed with provider: No  none             Method of communication with provider: none. Was able to contact Linh Maldonado for transitional outreach. She stated that she was doing better. She said that on  her Lt knee incision opened up and she had drainage coming out. She did see the orthopedic and she had her incision irrigated and debrided. 23  She had a small vac applied and it  will be removed in a few days. She said that her Lt knee pain was better and her antibiotic was renewed and will be picking it up today. She will be following up with orthopedic on  and will see PCP on   She had no needs or questions at this time. Addressed changes since last contact:  none  Discussed follow-up appointments. If no appointment was previously scheduled, appointment scheduling offered: No.   Is follow up appointment scheduled within 7 days of discharge? Yes. Follow Up  Future Appointments   Date Time Provider Leona Phan   2023 11:30 AM Darcella Baldy, APRN - CNP [place] [facility]   2023 10:00 AM MARIA M Stanley CNP [facility] follow up appointment(s):  ortho    Care Transition Nurse reviewed medical action plan and red flags with patient and discussed any barriers to care and/or understanding of plan of care after discharge. Discussed appropriate site of care based on symptoms and resources available to patient including: PCP  Specialist  When to call 911.  The patient agrees to contact the PCP office for questions related to their healthcare. Patients top risk factors for readmission: medical condition-post op complications from Lt total knee  Interventions to address risk factors: Obtained and reviewed discharge summary and/or continuity of care documents    Offered patient enrollment in the Remote Patient Monitoring (RPM) program for in-home monitoring: Patient is not eligible for RPM program.     Care Transitions Subsequent and Final Call    Subsequent and Final Calls  Do you have any ongoing symptoms?: No  Have your medications changed?: No  Do you have any questions related to your medications?: No  Do you currently have any active services?: No  Do you have any needs or concerns that I can assist you with?: No  Care Transitions Interventions  Other Interventions:             Care Transition Nurse provided contact information for future needs. Plan for follow-up call in 7-10 days based on severity of symptoms and risk factors.   Plan for next call: symptom management-follow up on visit with ortho/PCP and possible final call    Daksha Salvador RN

## 2023-01-13 ENCOUNTER — CARE COORDINATION (OUTPATIENT)
Dept: CASE MANAGEMENT | Age: 68
End: 2023-01-13

## 2023-01-13 NOTE — CARE COORDINATION
[facility] Care Transitions Follow Up Call    Care Transition Nurse contacted the patient by telephone to follow up after admission on 12/15/22. Verified name and  with patient as identifiers. Patient: Mandeep Angeles  Patient : 1955   MRN: [id number]  Reason for Admission: Chest pain  Discharge Date: 22 RARS: Readmission Risk Score: 14.5      Needs to be reviewed by the provider   Additional needs identified to be addressed with provider: Yes  Call ortho to check if outpt therapy needed. Method of communication with provider: phone. Was able to contact Mao Marie for final outreach. She stated that she was doing \"fine\". She denied any chest pain and that her knee swelling was slowly resolving, said that the redness was slowly fading and she did no have any fevers. She did see the orthopedic physician and will no be following up for another month. She said that home PT/OT have ended and is not currently going to outpt therapy. She said that the surgeon did not say anything about it. Attempted to call the ortho office to see if pt needs to follow up with outpt therapy, but office out to lunch will attempt after lunch hours. Recalled ortho during business hours and spoke with Karla Jackson. Explained that pt is unclear if she is to continue with therapy in an outpt setting. Karla Jackson requested a call back on Monday to see if they want her to start outpt therapy. Called Mao Marie and explained what Karla Jackson said and that she is to call the office on Monday to inquire about outpt therapy  No further concerns or questions   Informed of final outreach. Addressed changes since last contact:  none  Discussed follow-up appointments. If no appointment was previously scheduled, appointment scheduling offered: No.   Is follow up appointment scheduled within 7 days of discharge?  No.    Follow Up  Future Appointments   Date Time Provider Leona Phan   2023 10:00  [facility]     [facility] follow up appointment(s):     Care Transition Nurse reviewed medical action plan and red flags with patient and discussed any barriers to care and/or understanding of plan of care after discharge. Discussed appropriate site of care based on symptoms and resources available to patient including: PCP  Specialist  When to call 911. The patient agrees to contact the PCP office for questions related to their healthcare. Patients top risk factors for readmission: medical condition-post op complications of total knee  Interventions to address risk factors: Obtained and reviewed discharge summary and/or continuity of care documents    Offered patient enrollment in the Remote Patient Monitoring (RPM) program for in-home monitoring: Patient is not eligible for RPM program.     Care Transitions Subsequent and Final Call    Subsequent and Final Calls  Do you have any ongoing symptoms?: No  Have your medications changed?: No  Do you have any questions related to your medications?: No  Do you currently have any active services?: No  Do you have any needs or concerns that I can assist you with?: No  Care Transitions Interventions  Other Interventions:             Care Transition Nurse provided contact information for future needs. No further follow-up call indicated based on severity of symptoms and risk factors.   Plan for next call:  final call    Tal Carrera RN

## 2023-02-24 ENCOUNTER — HOSPITAL ENCOUNTER (OUTPATIENT)
Dept: GENERAL RADIOLOGY | Facility: CLINIC | Age: 68
End: 2023-02-24
Payer: MEDICARE

## 2023-02-24 ENCOUNTER — HOSPITAL ENCOUNTER (OUTPATIENT)
Facility: CLINIC | Age: 68
End: 2023-02-24
Payer: MEDICARE

## 2023-02-24 DIAGNOSIS — G89.29 CHRONIC PAIN OF LEFT KNEE: ICD-10-CM

## 2023-02-24 DIAGNOSIS — M25.562 CHRONIC PAIN OF LEFT KNEE: ICD-10-CM

## 2023-02-24 PROBLEM — T81.89XA DELAYED SURGICAL WOUND HEALING: Status: ACTIVE | Noted: 2022-12-29

## 2023-02-24 PROCEDURE — 73562 X-RAY EXAM OF KNEE 3: CPT

## 2023-02-28 ENCOUNTER — TELEPHONE (OUTPATIENT)
Dept: ORTHOPEDIC SURGERY | Age: 68
End: 2023-02-28

## 2023-03-06 ENCOUNTER — OFFICE VISIT (OUTPATIENT)
Dept: ORTHOPEDIC SURGERY | Age: 68
End: 2023-03-06

## 2023-03-06 ENCOUNTER — HOSPITAL ENCOUNTER (OUTPATIENT)
Age: 68
Setting detail: SPECIMEN
Discharge: HOME | End: 2023-03-06

## 2023-03-06 VITALS — WEIGHT: 208 LBS | HEIGHT: 70 IN | BODY MASS INDEX: 29.78 KG/M2

## 2023-03-06 DIAGNOSIS — M25.562 LEFT KNEE PAIN, UNSPECIFIED CHRONICITY: Primary | ICD-10-CM

## 2023-03-06 DIAGNOSIS — M25.562 LEFT KNEE PAIN, UNSPECIFIED CHRONICITY: ICD-10-CM

## 2023-03-06 LAB
ABSOLUTE EOS #: <0.03 K/UL (ref 0–0.44)
ABSOLUTE IMMATURE GRANULOCYTE: 0.03 K/UL (ref 0–0.3)
ABSOLUTE LYMPH #: 1.11 K/UL (ref 1.1–3.7)
ABSOLUTE MONO #: 0.55 K/UL (ref 0.1–1.2)
ALBUMIN SERPL-MCNC: 3.5 G/DL (ref 3.5–5.2)
ALBUMIN/GLOBULIN RATIO: 0.7 (ref 1–2.5)
ALP SERPL-CCNC: 118 U/L (ref 35–104)
ALT SERPL-CCNC: 7 U/L (ref 5–33)
ANION GAP SERPL CALCULATED.3IONS-SCNC: 13 MMOL/L (ref 9–17)
AST SERPL-CCNC: 24 U/L
BASOPHILS # BLD: 1 % (ref 0–2)
BASOPHILS ABSOLUTE: 0.03 K/UL (ref 0–0.2)
BILIRUB SERPL-MCNC: 0.5 MG/DL (ref 0.3–1.2)
BUN SERPL-MCNC: 14 MG/DL (ref 8–23)
CALCIUM SERPL-MCNC: 9.1 MG/DL (ref 8.6–10.4)
CHLORIDE SERPL-SCNC: 98 MMOL/L (ref 98–107)
CO2 SERPL-SCNC: 29 MMOL/L (ref 20–31)
CREAT SERPL-MCNC: 0.88 MG/DL (ref 0.5–0.9)
CRP SERPL HS-MCNC: 6.5 MG/L (ref 0–5)
EOSINOPHILS RELATIVE PERCENT: 0 % (ref 1–4)
ERYTHROCYTE [SEDIMENTATION RATE] IN BLOOD BY WESTERGREN METHOD: 45 MM/HR (ref 0–30)
GFR SERPL CREATININE-BSD FRML MDRD: >60 ML/MIN/1.73M2
GLUCOSE SERPL-MCNC: 102 MG/DL (ref 70–99)
HCT VFR BLD AUTO: 44 % (ref 36.3–47.1)
HGB BLD-MCNC: 13 G/DL (ref 11.9–15.1)
IMMATURE GRANULOCYTES: 1 %
LYMPHOCYTES # BLD: 21 % (ref 24–43)
MCH RBC QN AUTO: 28.5 PG (ref 25.2–33.5)
MCHC RBC AUTO-ENTMCNC: 29.5 G/DL (ref 28.4–34.8)
MCV RBC AUTO: 96.5 FL (ref 82.6–102.9)
MONOCYTES # BLD: 10 % (ref 3–12)
NRBC AUTOMATED: 0 PER 100 WBC
PDW BLD-RTO: 15.3 % (ref 11.8–14.4)
PLATELET # BLD AUTO: 241 K/UL (ref 138–453)
PMV BLD AUTO: 11.7 FL (ref 8.1–13.5)
POTASSIUM SERPL-SCNC: 4.5 MMOL/L (ref 3.7–5.3)
PROT SERPL-MCNC: 8.4 G/DL (ref 6.4–8.3)
RBC # BLD: 4.56 M/UL (ref 3.95–5.11)
RBC # BLD: ABNORMAL 10*6/UL
SEG NEUTROPHILS: 67 % (ref 36–65)
SEGMENTED NEUTROPHILS ABSOLUTE COUNT: 3.67 K/UL (ref 1.5–8.1)
SODIUM SERPL-SCNC: 140 MMOL/L (ref 135–144)
WBC # BLD AUTO: 5.4 K/UL (ref 3.5–11.3)

## 2023-03-06 RX ORDER — DOXYCYCLINE HYCLATE 100 MG
100 TABLET ORAL 2 TIMES DAILY
Qty: 14 TABLET | Refills: 0 | Status: SHIPPED | OUTPATIENT
Start: 2023-03-06 | End: 2023-03-13

## 2023-03-06 ASSESSMENT — ENCOUNTER SYMPTOMS
ROS SKIN COMMENTS: NEGATIVE FOR RASH
SHORTNESS OF BREATH: 0
ABDOMINAL PAIN: 0
EYE DISCHARGE: 0

## 2023-03-06 NOTE — LETTER
Dr. Tanika Rodriguez, [address] [place] [address] AND SPORTS MEDICINE  [place] [address]  Dept: [phone]  Dept Fax: [phone]        3/6/23    Patient: Tatianna Banda  YOB: 1955    Dear MARIA M Bales CNP,    I had the pleasure of seeing one of your patients, Bryson Lubin today in the office. Below are the relevant portions of my assessment and plan of care. IMPRESSION:  1. Left knee pain, unspecified chronicity      PLAN:  Patient apparently per notes and her recollection had a superficial abscess to the knee which was treated with incision irrigation and debridement on 1/2/2023 after a total knee arthroplasty on 12/7/2022. She continues to have swelling, erythema and concerns for infection although she clinically does not appear to be ill and she does not feel ill. I been asked to be involved as a second opinion and for evaluation and treatment. Appropriate laboratory studies have been ordered. We are going to continue the doxycycline that the patient was started on 9 days ago by her PCP and, by the patient's own recollection, has seen some improvement with that. We are going to get infectious disease consultation. I plan to see her back in 10 days to 2 weeks to see how she is doing clinically and consideration for knee aspiration could be done at some point if necessary. Thank you for allowing me to participate in the care of this patient. I will keep you updated on this patient's follow up and I look forward to serving you and your patients again in the future. Please don't hesitate to contact me at my mobile number [phone].         Dave Mahajan

## 2023-03-06 NOTE — PROGRESS NOTES
[address]  AND SPORTS MEDICINE  [address]  [facility] [address]  Dept: [phone]    Ambulatory Orthopedic Consult      CHIEF COMPLAINT:    Chief Complaint   Patient presents with    Knee Pain     Left  S/p tka 12/7/22       HISTORY OF PRESENT ILLNESS:      The patient is a 79 y.o. female who is being seen at the request of  MARIA M Arrieta CNP for consultation and evaluation of left knee pain, swelling, erythema. The patient underwent left total knee arthroplasty by Dr. Dennie Cookey on 12/7/2022. On 1/2/2023 the patient underwent incision and debridement of the superficial suture abscess and at that time no evidence of a deep tracking infection was visualized. She had a wound vacuum at the time reportedly. She was treated with antibiotics doxycycline for 10 days. Since that time she has continued concerns for infection with erythema and swelling to the anterior aspect of her knee. She denies feeling ill or sick. She denies fevers, chills, nausea, vomiting, diarrhea. She was seen by her orthopedic surgeon 1 week ago and was told that the incision \"looks good\". She was recently seen by her primary care physician who referred the patient here for second opinion. Of note, the patient does have a family member in the past to an infected knee replacement who had an above-the-knee amputation.     Past Medical History:    Past Medical History:   Diagnosis Date    Chronic back pain     S/P L4/L5 OR    Hypertension     Hypothyroidism        Past Surgical History:    Past Surgical History:   Procedure Laterality Date    CHOLECYSTECTOMY      HIP FRACTURE SURGERY Right 06/03/2016    closed reduction percutaneous pinning    HYSTERECTOMY (CERVIX STATUS UNKNOWN)  2005    SPINE SURGERY  2005    L4/L5       Current Medications:   Current Outpatient Medications   Medication Sig Dispense Refill    Potassium 99 MG TABS Take by mouth      doxycycline hyclate (VIBRA-TABS) 100 MG tablet Take 1 tablet by mouth 2 times daily for 7 days 14 tablet 0    doxycycline hyclate (VIBRA-TABS) 100 MG tablet Take 1 tablet by mouth 2 times daily for 10 days 20 tablet 0    busPIRone (BUSPAR) 10 MG tablet TAKE 1 TO 2 TABLETS BY MOUTH THREE TIMES DAILY AS NEEDED 180 tablet 0    sertraline (ZOLOFT) 25 MG tablet Take 1 tablet by mouth once daily 30 tablet 5    morphine (MS CONTIN) 15 MG extended release tablet Take 15 mg by mouth 2 times daily. docusate sodium (COLACE) 100 MG capsule Take 200 mg by mouth daily      Vitamin D, Ergocalciferol, 03364 units CAPS Take 50,000 Units by mouth once a week 4 capsule 5    levothyroxine (SYNTHROID) 100 MCG tablet Take 1 tablet by mouth daily 90 tablet 1    gabapentin (NEURONTIN) 300 MG capsule Take 300 mg by mouth in the morning and 300 mg before bedtime. meloxicam (MOBIC) 7.5 MG tablet Take 1 tablet by mouth daily as needed for Pain 30 tablet 0    atenolol (TENORMIN) 25 MG tablet Take 25 mg by mouth daily      oxyCODONE-acetaminophen (PERCOCET) 5-325 MG per tablet Take 1 tablet by mouth every 6 hours as needed for Pain. (Patient not taking: Reported on 3/6/2023)       No current facility-administered medications for this visit. Allergies:    Patient has no known allergies. Social History:   Social History     Socioeconomic History    Marital status:       Spouse name: Not on file    Number of children: Not on file    Years of education: Not on file    Highest education level: Not on file   Occupational History    Not on file   Tobacco Use    Smoking status: Never    Smokeless tobacco: Never   Vaping Use    Vaping Use: Never used   Substance and Sexual Activity    Alcohol use: No    Drug use: No    Sexual activity: Never   Other Topics Concern    Not on file   Social History Narrative    Not on file     Social Determinants of Health     Financial Resource Strain: Low Risk     Difficulty of Paying Living Expenses: Not hard at all   Food Insecurity: No Food Insecurity    Worried About Running Out of Food in the Last Year: Never true    Ran Out of Food in the Last Year: Never true   Transportation Needs: Not on file   Physical Activity: Not on file   Stress: Not on file   Social Connections: Not on file   Intimate Partner Violence: Not on file   Housing Stability: Not on file       Family History:  Family History   Problem Relation Age of Onset    High Blood Pressure Mother     Other Mother         low heart rate     Pacemaker Mother     Heart Attack Father     Lung Cancer Father     Cancer Sister         throat cancer x2    Breast Cancer Paternal Aunt     Cancer Paternal Aunt         multiple other cancers          REVIEW OF SYSTEMS:  Review of Systems   Constitutional:  Positive for activity change. Negative for fever. HENT:  Negative for dental problem. Eyes:  Negative for discharge. Respiratory:  Negative for shortness of breath. Cardiovascular:  Negative for chest pain. Gastrointestinal:  Negative for abdominal pain. Genitourinary: Negative. Musculoskeletal:  Positive for arthralgias. Skin:         Negative for rash   Neurological:  Positive for weakness. Psychiatric/Behavioral:  Negative for confusion. I have reviewed the CC, HPI, ROS, PMH, FHX, Social History, and if not present in this note, I have reviewed in the patient's chart. I agree with the documentation provided by other staff and have reviewed their documentation prior to providing my signature indicating agreement. PHYSICAL EXAM:  Ht 5' 10\" (1.778 m)   Wt 208 lb (94.3 kg)   BMI 29.84 kg/m²  Body mass index is 29.84 kg/m². Physical Exam  Gen: alert and oriented to person and place. Psych:  Appropriate affect; Appropriate knowledge base; Appropriate mood; No hallucinations; Head: normocephalic, atraumatic   Chest: symmetric chest excursion; nonlabored respiratory effort.   Pelvis: stable; no obvious pelvis deformity  Ortho Exam  Extremity: Moderate erythema anterior aspect of the left knee is appreciated. Range of motion left knee is 5-110. Patient ambulates a mild short weightbearing phase of the left lower extremity. No gross instability of the left knee is appreciated. Motor, sensory, vascular examination left lower extremity is grossly intact without focal deficits. Media Information  Document Information    Wound Care Image:  Wound      03/06/2023 11:18   Attached To: Office Visit on 3/6/23 with Bertin Sunshine DO     Source Information    Bertin Sunshine DO  [facility]       Radiology: XR KNEE LEFT (3 VIEWS)    Result Date: 2/24/2023  EXAMINATION: THREE X-RAY VIEWS OF THE LEFT KNEE 2/24/2023 11:22 am COMPARISON: 10/04/2022. HISTORY: ORDERING SYSTEM PROVIDED HISTORY:  Chronic pain of left knee. TECHNOLOGIST PROVIDED HISTORY: Left knee pain and swelling s/p total knee replacement. Reason for Exam:  Pt states left knee pain x2 weeks after surgery pt states debrided due to infection area is warm and red. FINDINGS: Diffuse bone demineralization. Interval left total knee arthroplasty with patellar resurfacing. Orthopedic hardware appears intact. No evidence for hardware complication. No acute fractures or dislocations. Well-corticated ossification adjacent to the lateral aspect of the lateral femoral condyle may be on the basis of prior remote injury. No suspicious focal bony lesions. No bony erosions or bony destructive changes. There may be a small joint effusion. Mild diffuse nonspecific subcutaneous edema. No radiopaque foreign bodies or soft tissue gas. Interval left total knee arthroplasty with patellar resurfacing. Intact orthopedic hardware without evidence for hardware complication. No acute bony abnormality. No x-ray evidence for osteomyelitis. There may be a small knee joint effusion. Diffuse nonspecific subcutaneous edema. No soft tissue gas. ASSESSMENT:     1. Left knee pain, unspecified chronicity           PLAN:       Patient apparently per notes and her recollection had a superficial abscess to the knee which was treated with incision irrigation and debridement on 1/2/2023 after a total knee arthroplasty on 12/7/2022. She continues to have swelling, erythema and concerns for infection although she clinically does not appear to be ill and she does not feel ill. I been asked to be involved as a second opinion and for evaluation and treatment. Appropriate laboratory studies have been ordered. We are going to continue the doxycycline that the patient was started on 9 days ago by her PCP and, by the patient's own recollection, has seen some improvement with that. We are going to get infectious disease consultation. I plan to see her back in 10 days to 2 weeks to see how she is doing clinically and consideration for knee aspiration could be done at some point if necessary. No follow-ups on file.     Orders Placed This Encounter   Medications    doxycycline hyclate (VIBRA-TABS) 100 MG tablet     Sig: Take 1 tablet by mouth 2 times daily for 7 days     Dispense:  14 tablet     Refill:  0       Orders Placed This Encounter   Procedures    CBC with Auto Differential     Standing Status:   Future     Standing Expiration Date:   3/6/2024    Comprehensive Metabolic Panel     Standing Status:   Future     Standing Expiration Date:   3/6/2024    Sedimentation Rate     Standing Status:   Future     Standing Expiration Date:   3/6/2024    C-Reactive Protein     Standing Status:   Future     Standing Expiration Date:   3/6/2024    External Referral To Infectious Disease     Referral Priority:   Routine     Referral Type:   Eval and Treat     Referral Reason:   Specialty Services Required     Requested Specialty:   Infectious Diseases     Number of Visits Requested:   1       This note is created with the assistance of a speech recognition program. While intending to generate a document that actually reflects the content of the visit, the document can still have some errors including those of syntax and sound a like substitutions which may escape proof reading.   In such instances, actual meaning can be extrapolated by contextual diversion      Electronically signed by Ventura Flanagan DO, Gregoria Fam on 3/6/2023 at 12:41 PM

## 2023-03-14 ENCOUNTER — OFFICE VISIT (OUTPATIENT)
Dept: ORTHOPEDIC SURGERY | Age: 68
End: 2023-03-14
Payer: MEDICARE

## 2023-03-14 VITALS — BODY MASS INDEX: 29.78 KG/M2 | WEIGHT: 208 LBS | RESPIRATION RATE: 16 BRPM | HEIGHT: 70 IN

## 2023-03-14 DIAGNOSIS — Z96.652 HISTORY OF TOTAL KNEE ARTHROPLASTY, LEFT: Primary | ICD-10-CM

## 2023-03-14 PROCEDURE — 99214 OFFICE O/P EST MOD 30 MIN: CPT | Performed by: PHYSICIAN ASSISTANT

## 2023-03-14 PROCEDURE — 1123F ACP DISCUSS/DSCN MKR DOCD: CPT | Performed by: PHYSICIAN ASSISTANT

## 2023-03-14 RX ORDER — DOXYCYCLINE HYCLATE 100 MG
100 TABLET ORAL 2 TIMES DAILY
Qty: 14 TABLET | Refills: 0 | Status: SHIPPED | OUTPATIENT
Start: 2023-03-14 | End: 2023-03-21

## 2023-03-14 NOTE — PROGRESS NOTES
[address] [place] [address] AND SPORTS MEDICINE  [address] [address]  Dept: [phone]  Dept Fax: [phone]        Ambulatory Follow Up      Subjective:   Olu Logan is a 79y.o. year old female who presents to our office today for routine followup regarding her   1. History of total knee arthroplasty, left        Chief Complaint   Patient presents with    Knee Pain     F/u left knee         HPI Olu Logan  is a 79 y.o.  female who presents today in follow for left knee pain, swelling and skin erythema. Khadar French had a left total knee arthroplasty on 12/7/2022 with Dr Juan Andino at [facility] and on 1/2/2023 underwent an incision and debridement of a superficial suture abscess that at that time had no evidence of a deep tracking infection. The patient was last seen on 3/6/2023 with Dr Dalia Cobb, DO for a second opinion at the request of her PCP and underwent treatment in the form of referral to infectious disease and bloodwork to help determine if she has an infection. She was placed on Doxycycline 100mg BID x 7 days by her PCP prior to the appointment with Dr Sheyla Chan - which was extended for another week after her appointment with Dr Sheyla Chan. The patient notes she was not aware that she needed to call and schedule an appointment with infectious disease, therefore she has not been evaluated yet. She does note improvement in the skin redness and swelling since her last appointment. She denies feeling ill, having a fever, nausea, vomiting or diarrhea since the initial onset of skin redness and swelling. She denies numbness or tingling in the left lower extremity. She denies limitations in range of motion within the left knee. Review of Systems   Constitutional:  Negative for activity change and fever. HENT:  Negative for sneezing. Respiratory:  Negative for cough and shortness of breath. Cardiovascular:  Negative for chest pain. Gastrointestinal:  Negative for vomiting. Musculoskeletal:  Negative for arthralgias, joint swelling and myalgias. Skin:  Negative for color change. Neurological:  Negative for weakness and numbness. Psychiatric/Behavioral:  Negative for sleep disturbance. Objective :   Resp 16   Ht 5' 10\" (1.778 m)   Wt 208 lb (94.3 kg)   BMI 29.84 kg/m²  Body mass index is 29.84 kg/m². General: Olu Logan is a 79 y.o. female who is alert and oriented and sitting comfortably in our office. Ortho Exam  MS: Patient ambulates a mild short weightbearing phase of the left lower extremity. Moderate erythema anterior aspect of the left knee is appreciated, improved when compared to prior evaluation (Pictures below). Varicose veins noted diffusely to the left lower extremity. Range of motion left knee is 5-110. No gross instability of the left knee is appreciated. Motor, sensory, vascular examination left lower extremity is grossly intact without focal deficits. Neuro: alert and oriented to person and place. Eyes: Extra-ocular muscles intact  Mouth: Oral mucosa moist. No perioral lesions  Pulm: Respirations unlabored and regular. Symmetric chest excursion without outward deformity is noted. Skin: warm, well perfused  Psych:   Patient has good fund of knowledge and displays understanging of exam, diagnosis, and plan. Left knee 3/14/2023      Left knee 3/6/2023        Radiology: No new imaging obtained today in office. XR KNEE LEFT (3 VIEWS)     Result Date: 2/24/2023  EXAMINATION: THREE X-RAY VIEWS OF THE LEFT KNEE 2/24/2023 11:22 am COMPARISON: 10/04/2022. HISTORY: ORDERING SYSTEM PROVIDED HISTORY:  Chronic pain of left knee. TECHNOLOGIST PROVIDED HISTORY: Left knee pain and swelling s/p total knee replacement. Reason for Exam:  Pt states left knee pain x2 weeks after surgery pt states debrided due to infection area is warm and red.  FINDINGS: Diffuse bone demineralization. Interval left total knee arthroplasty with patellar resurfacing. Orthopedic hardware appears intact. No evidence for hardware complication. No acute fractures or dislocations. Well-corticated ossification adjacent to the lateral aspect of the lateral femoral condyle may be on the basis of prior remote injury. No suspicious focal bony lesions. No bony erosions or bony destructive changes. There may be a small joint effusion. Mild diffuse nonspecific subcutaneous edema. No radiopaque foreign bodies or soft tissue gas. Interval left total knee arthroplasty with patellar resurfacing. Intact orthopedic hardware without evidence for hardware complication. No acute bony abnormality. No x-ray evidence for osteomyelitis. There may be a small knee joint effusion. Diffuse nonspecific subcutaneous edema. No soft tissue gas. Bloodwork results:     Sed Rate   Date/Time Value Ref Range Status   03/06/2023 11:40 AM 45 (H) 0 - 30 mm/Hr Final     CRP   Date/Time Value Ref Range Status   03/06/2023 11:40 AM 6.5 (H) 0.0 - 5.0 mg/L Final         Procedure: None    Assessment:      1. History of total knee arthroplasty, left       Plan:      Yany Dias is a 79 y.o. female here today for left knee swelling and skin erythema after a left total knee arthroplasty completed on 12/7/2022 by Dr. Asher Rocha at [facility]. Was last evaluated on 3/6/2023 by Dr. Bevin Ahumada and was recommended to be evaluated by infectious disease. The referral was placed at the last appointment but the patient never called to make an appointment therefore a new referral was placed for the patient to be evaluated by Infectious Disease at [facility]. Patient was given an extension on her oral antibiotic, doxycycline 100 mg twice daily x7 days. I discussed with the patient that if she is unable to get into see infectious disease quickly that we will call the office to facilitate a quicker appointment.   She noted her understanding. She was instructed to follow-up with Dr. Uli Barakat after being evaluated by infectious disease to discuss next steps in her treatment. We discussed that the patient should call us with any questions or concerns. The patient voiced her understanding. Follow up:Return for after ID evaluation - follow up with Dr Uli Barakat. Total Time: 35 min      Orders Placed This Encounter   Medications    doxycycline hyclate (VIBRA-TABS) 100 MG tablet     Sig: Take 1 tablet by mouth 2 times daily for 7 days     Dispense:  14 tablet     Refill:  0       Orders Placed This Encounter   Procedures    Arlet Moreira MD, Infectious Disease, [facility]     Referral Priority:   Routine     Referral Type:   Eval and Treat     Referral Reason:   Specialty Services Required     Referred to Provider:   Valentino Henson MD     Requested Specialty:   Infectious Diseases     Number of Visits Requested:   1       This note is created with the assistance of a speech recognition program.  While intending to generate a document that actually reflects the content of the visit, the document can still have some errors including those of syntax and sound a like substitutions which may escape proof reading. In such instances, actual meaning can be extrapolated by contextual diversion.      Electronically signed by Rama Garcia PA-C on 3/15/2023 at 9:16 PM

## 2023-03-15 ASSESSMENT — ENCOUNTER SYMPTOMS
COLOR CHANGE: 0
VOMITING: 0
SHORTNESS OF BREATH: 0
COUGH: 0

## 2023-03-21 DIAGNOSIS — Z96.652 HISTORY OF TOTAL KNEE ARTHROPLASTY, LEFT: ICD-10-CM

## 2023-03-21 RX ORDER — DOXYCYCLINE HYCLATE 100 MG
TABLET ORAL
Qty: 14 TABLET | Refills: 0 | OUTPATIENT
Start: 2023-03-21

## 2023-03-22 DIAGNOSIS — Z96.652 HISTORY OF TOTAL KNEE ARTHROPLASTY, LEFT: ICD-10-CM

## 2023-03-22 RX ORDER — DOXYCYCLINE HYCLATE 100 MG
100 TABLET ORAL 2 TIMES DAILY
Qty: 14 TABLET | Refills: 0 | Status: SHIPPED | OUTPATIENT
Start: 2023-03-22 | End: 2023-03-29

## 2023-03-22 RX ORDER — DOXYCYCLINE HYCLATE 100 MG
TABLET ORAL
Qty: 14 TABLET | Refills: 0 | OUTPATIENT
Start: 2023-03-22

## 2023-03-29 ENCOUNTER — OFFICE VISIT (OUTPATIENT)
Dept: INFECTIOUS DISEASES | Age: 68
End: 2023-03-29
Payer: MEDICARE

## 2023-03-29 VITALS
WEIGHT: 208 LBS | HEART RATE: 68 BPM | DIASTOLIC BLOOD PRESSURE: 70 MMHG | BODY MASS INDEX: 29.78 KG/M2 | HEIGHT: 70 IN | TEMPERATURE: 97.3 F | SYSTOLIC BLOOD PRESSURE: 130 MMHG

## 2023-03-29 DIAGNOSIS — Z96.652 HISTORY OF TOTAL KNEE ARTHROPLASTY, LEFT: ICD-10-CM

## 2023-03-29 DIAGNOSIS — T84.59XA INFECTION OF PROSTHETIC KNEE JOINT, INITIAL ENCOUNTER (HCC): Primary | ICD-10-CM

## 2023-03-29 DIAGNOSIS — Z96.659 INFECTION OF PROSTHETIC KNEE JOINT, INITIAL ENCOUNTER (HCC): Primary | ICD-10-CM

## 2023-03-29 PROCEDURE — 99204 OFFICE O/P NEW MOD 45 MIN: CPT | Performed by: INTERNAL MEDICINE

## 2023-03-29 PROCEDURE — 1123F ACP DISCUSS/DSCN MKR DOCD: CPT | Performed by: INTERNAL MEDICINE

## 2023-03-29 RX ORDER — DOXYCYCLINE HYCLATE 100 MG
TABLET ORAL
Qty: 14 TABLET | Refills: 0 | OUTPATIENT
Start: 2023-03-29

## 2023-03-29 RX ORDER — DOXYCYCLINE HYCLATE 100 MG/1
CAPSULE ORAL
COMMUNITY
Start: 2023-01-20 | End: 2023-03-29 | Stop reason: SDUPTHER

## 2023-03-29 ASSESSMENT — ENCOUNTER SYMPTOMS
GASTROINTESTINAL NEGATIVE: 1
RESPIRATORY NEGATIVE: 1

## 2023-03-29 NOTE — PROGRESS NOTES
Georgi and the patient reports that the Thursday before Christmas she had some drainage along the stitches and there was concern that it was infected. The patient then reports that she was taken back to the operating room 1/2/2023 for a superficial wound infection due to delayed wound healing and the patient underwent incision and drainage with irrigation and debridement of the skin and subcutaneous tissue and the wound was 10 x 2 x 1 cm with primary incisional closure and application of an incisional wound VAC. The patient was started on antimicrobial therapy with doxycycline and even after this drainage procedure there remain concerns that an infection was present. The patient was subsequently sent to see Dr. Danilo Hsu for a second opinion due to continued concerns for infection with erythema and swelling in the anterior aspect of the knee. The patient herself did not have any subjective fevers or chills, no nausea vomiting or diarrhea. The patient was noted to have ongoing erythema and soft tissue swelling in the left anterior knee and due to these changes the patient was sent to see me for an infectious disease consultation and has follow-up with plans to consider knee aspiration    I have personally reviewed the past medical history,past surgical history, medications, social history, and family history, and I have updated the database accordingly.   PastMedical History:     Past Medical History:   Diagnosis Date    Chronic back pain     S/P L4/L5 OR    Hypertension     Hypothyroidism      Past Surgical  History:     Past Surgical History:   Procedure Laterality Date    CHOLECYSTECTOMY      HIP FRACTURE SURGERY Right 06/03/2016    closed reduction percutaneous pinning    HYSTERECTOMY (CERVIX STATUS UNKNOWN)  2005    SPINE SURGERY  2005    L4/L5     Medications:     Current Outpatient Medications   Medication Sig Dispense Refill    vitamin D 25 MCG (1000 UT) CAPS Take 1,000 Units by mouth 2 times

## 2023-04-03 ENCOUNTER — TELEPHONE (OUTPATIENT)
Dept: PULMONOLOGY | Age: 68
End: 2023-04-03

## 2023-04-03 DIAGNOSIS — Z96.652 HISTORY OF TOTAL KNEE ARTHROPLASTY, LEFT: ICD-10-CM

## 2023-04-03 RX ORDER — DOXYCYCLINE HYCLATE 100 MG
TABLET ORAL
Qty: 14 TABLET | Refills: 0 | OUTPATIENT
Start: 2023-04-03

## 2023-04-03 RX ORDER — DOXYCYCLINE HYCLATE 100 MG
100 TABLET ORAL 2 TIMES DAILY
Qty: 60 TABLET | Refills: 0 | Status: ON HOLD | OUTPATIENT
Start: 2023-04-03 | End: 2023-04-25

## 2023-04-03 NOTE — TELEPHONE ENCOUNTER
Patient says there is no plan that she is aware of. She thought you were going to talk with Dr. Jerome Rios when she talked with you last week. She does not see Dr. Jerome Rios again until 4/13.

## 2023-04-03 NOTE — TELEPHONE ENCOUNTER
Dr. Noel Evans patient. Patient calling, she went to the  infectious dx appointment that Dr. Noel Evans ordered, she is almost out of doxycyline. Does she need to continue? [facility] on [address] is a refill is needed.  Call back [phone]

## 2023-04-03 NOTE — TELEPHONE ENCOUNTER
I guess the decision whether to continue doxycycline depends on what the plan is orthopedically.   If the plan is for the patient to undergo a joint aspiration or even potential surgery I would definitely hold off antibiotics for at least 2 weeks prior to the procedure  Otherwise if there is no plan for intervention I would recommend continuing the doxycycline

## 2023-04-03 NOTE — TELEPHONE ENCOUNTER
Germán Stephenson gave her enough of this antibiotic until she seen infectious disease. She needs to contact them if this is something they want to continue for her. Can you please contact patient, and express that. Thank you.

## 2023-04-03 NOTE — TELEPHONE ENCOUNTER
Patient was seen 3/29. She states she talked with Dr. Pat Mcdonald and they said she should discuss with you about continuing the doxycycline. She says if she should continue taking it, she will need you to send in the refill to [address] [place] [address] on [facility]. Or let her know if she should stop taking it. Please advise.

## 2023-04-06 NOTE — TELEPHONE ENCOUNTER
Spoken with Dr. Fatuma Miguel. He says he sent the antibiotic. LM letting patient know it was sent and to let us know if there are any other concerns.

## 2023-04-13 ENCOUNTER — OFFICE VISIT (OUTPATIENT)
Dept: ORTHOPEDIC SURGERY | Age: 68
End: 2023-04-13
Payer: MEDICARE

## 2023-04-13 VITALS — HEIGHT: 70 IN | RESPIRATION RATE: 16 BRPM | WEIGHT: 208 LBS | BODY MASS INDEX: 29.78 KG/M2

## 2023-04-13 DIAGNOSIS — T84.50XS PROSTHETIC JOINT INFECTION, SEQUELA: Primary | ICD-10-CM

## 2023-04-13 PROCEDURE — 1123F ACP DISCUSS/DSCN MKR DOCD: CPT | Performed by: ORTHOPAEDIC SURGERY

## 2023-04-13 PROCEDURE — 99214 OFFICE O/P EST MOD 30 MIN: CPT | Performed by: ORTHOPAEDIC SURGERY

## 2023-04-17 ASSESSMENT — ENCOUNTER SYMPTOMS
EYE DISCHARGE: 0
ABDOMINAL PAIN: 0
SHORTNESS OF BREATH: 0
ROS SKIN COMMENTS: NEGATIVE FOR RASH

## 2023-04-17 NOTE — PROGRESS NOTES
significant acute and/or chronic pain, possible need for amputation, permanent loss of motion, and permanent loss of function. As well as the general complications of anesthesia, which include but are not limited to: myocardial infarction and/or heart attack, stroke, multi organ system failure or even possible death, prolonged hospital stay, blood clots, pulmonary embolism, abnormal reaction to medication, visual and neurological disturbances, constipation, ischemic bowel, bowel obstruction, bowel perforation, ileus and mental status changes. No guarantees were made. Follow up:No follow-ups on file. No orders of the defined types were placed in this encounter. No orders of the defined types were placed in this encounter. This note is created with the assistance of a speech recognition program.  While intending to generate a document that actually reflects the content of the visit, the document can still have some errors including those of syntax and sound a like substitutions which may escape proof reading.   In such instances, actual meaning can be extrapolated by contextual diversion      Electronically signed by Collin Randhawa DO, Ree Lis on 4/17/2023 at 7:47 AM

## 2023-04-24 ENCOUNTER — ANESTHESIA EVENT (OUTPATIENT)
Dept: OPERATING ROOM | Age: 68
End: 2023-04-24
Payer: MEDICARE

## 2023-04-25 ENCOUNTER — APPOINTMENT (OUTPATIENT)
Dept: GENERAL RADIOLOGY | Age: 68
End: 2023-04-25
Attending: ORTHOPAEDIC SURGERY
Payer: MEDICARE

## 2023-04-25 ENCOUNTER — HOSPITAL ENCOUNTER (OUTPATIENT)
Age: 68
Setting detail: OBSERVATION
Discharge: HOME | End: 2023-04-27
Attending: ORTHOPAEDIC SURGERY | Admitting: ORTHOPAEDIC SURGERY
Payer: MEDICARE

## 2023-04-25 ENCOUNTER — ANESTHESIA (OUTPATIENT)
Dept: OPERATING ROOM | Age: 68
End: 2023-04-25
Payer: MEDICARE

## 2023-04-25 DIAGNOSIS — Z96.652 S/P REVISION OF TOTAL KNEE, LEFT: ICD-10-CM

## 2023-04-25 DIAGNOSIS — G89.18 POST-OP PAIN: Primary | ICD-10-CM

## 2023-04-25 DIAGNOSIS — T84.50XS JOINT PROSTHESIS INFECTION OR INFLAMMATION, SEQUELA: ICD-10-CM

## 2023-04-25 PROBLEM — T84.59XA INFECTION OF PROSTHETIC KNEE JOINT (HCC): Status: ACTIVE | Noted: 2023-04-25

## 2023-04-25 PROBLEM — I10 PRIMARY HYPERTENSION: Status: ACTIVE | Noted: 2023-04-25

## 2023-04-25 PROBLEM — Z96.659 INFECTION OF PROSTHETIC KNEE JOINT (HCC): Status: ACTIVE | Noted: 2023-04-25

## 2023-04-25 PROCEDURE — 73562 X-RAY EXAM OF KNEE 3: CPT

## 2023-04-25 PROCEDURE — G0378 HOSPITAL OBSERVATION PER HR: HCPCS

## 2023-04-25 PROCEDURE — 6360000002 HC RX W HCPCS: Performed by: STUDENT IN AN ORGANIZED HEALTH CARE EDUCATION/TRAINING PROGRAM

## 2023-04-25 PROCEDURE — 7100000001 HC PACU RECOVERY - ADDTL 15 MIN: Performed by: ORTHOPAEDIC SURGERY

## 2023-04-25 PROCEDURE — 3700000001 HC ADD 15 MINUTES (ANESTHESIA): Performed by: ORTHOPAEDIC SURGERY

## 2023-04-25 PROCEDURE — 3700000000 HC ANESTHESIA ATTENDED CARE: Performed by: ORTHOPAEDIC SURGERY

## 2023-04-25 PROCEDURE — 87205 SMEAR GRAM STAIN: CPT

## 2023-04-25 PROCEDURE — 2580000003 HC RX 258: Performed by: STUDENT IN AN ORGANIZED HEALTH CARE EDUCATION/TRAINING PROGRAM

## 2023-04-25 PROCEDURE — 2709999900 HC NON-CHARGEABLE SUPPLY: Performed by: ORTHOPAEDIC SURGERY

## 2023-04-25 PROCEDURE — 87070 CULTURE OTHR SPECIMN AEROBIC: CPT

## 2023-04-25 PROCEDURE — C1776 JOINT DEVICE (IMPLANTABLE): HCPCS | Performed by: ORTHOPAEDIC SURGERY

## 2023-04-25 PROCEDURE — 3600000015 HC SURGERY LEVEL 5 ADDTL 15MIN: Performed by: ORTHOPAEDIC SURGERY

## 2023-04-25 PROCEDURE — 2580000003 HC RX 258

## 2023-04-25 PROCEDURE — 3600000005 HC SURGERY LEVEL 5 BASE: Performed by: ORTHOPAEDIC SURGERY

## 2023-04-25 PROCEDURE — 64447 NJX AA&/STRD FEMORAL NRV IMG: CPT | Performed by: STUDENT IN AN ORGANIZED HEALTH CARE EDUCATION/TRAINING PROGRAM

## 2023-04-25 PROCEDURE — 6360000002 HC RX W HCPCS: Performed by: NURSE ANESTHETIST, CERTIFIED REGISTERED

## 2023-04-25 PROCEDURE — 7100000000 HC PACU RECOVERY - FIRST 15 MIN: Performed by: ORTHOPAEDIC SURGERY

## 2023-04-25 PROCEDURE — 2720000010 HC SURG SUPPLY STERILE: Performed by: ORTHOPAEDIC SURGERY

## 2023-04-25 PROCEDURE — C1713 ANCHOR/SCREW BN/BN,TIS/BN: HCPCS | Performed by: ORTHOPAEDIC SURGERY

## 2023-04-25 PROCEDURE — 6370000000 HC RX 637 (ALT 250 FOR IP)

## 2023-04-25 PROCEDURE — 6360000002 HC RX W HCPCS: Performed by: ORTHOPAEDIC SURGERY

## 2023-04-25 PROCEDURE — 99222 1ST HOSP IP/OBS MODERATE 55: CPT | Performed by: INTERNAL MEDICINE

## 2023-04-25 PROCEDURE — 87075 CULTR BACTERIA EXCEPT BLOOD: CPT

## 2023-04-25 PROCEDURE — 87176 TISSUE HOMOGENIZATION CULTR: CPT

## 2023-04-25 PROCEDURE — 2500000003 HC RX 250 WO HCPCS: Performed by: NURSE ANESTHETIST, CERTIFIED REGISTERED

## 2023-04-25 PROCEDURE — 2580000003 HC RX 258: Performed by: NURSE ANESTHETIST, CERTIFIED REGISTERED

## 2023-04-25 DEVICE — COMPONENT FEM STEM LG KNEE REMEDY: Type: IMPLANTABLE DEVICE | Site: KNEE | Status: FUNCTIONAL

## 2023-04-25 DEVICE — CEMENT BNE 40GM HI VISC RADPQ FOR REV SURG: Type: IMPLANTABLE DEVICE | Site: KNEE | Status: FUNCTIONAL

## 2023-04-25 DEVICE — CEMENT BNE 40GM W/ GENT HI VISC RADPQ FOR REV SURG: Type: IMPLANTABLE DEVICE | Site: KNEE | Status: FUNCTIONAL

## 2023-04-25 DEVICE — COMPONENT TIB LNG KNEE STEM REMEDY: Type: IMPLANTABLE DEVICE | Site: KNEE | Status: FUNCTIONAL

## 2023-04-25 DEVICE — EXTENSION STEM 100 MM KNEE REMEDY: Type: IMPLANTABLE DEVICE | Site: KNEE | Status: FUNCTIONAL

## 2023-04-25 RX ORDER — FENTANYL CITRATE 50 UG/ML
INJECTION, SOLUTION INTRAMUSCULAR; INTRAVENOUS PRN
Status: DISCONTINUED | OUTPATIENT
Start: 2023-04-25 | End: 2023-04-25 | Stop reason: SDUPTHER

## 2023-04-25 RX ORDER — ATENOLOL 25 MG/1
25 TABLET ORAL DAILY
Status: DISCONTINUED | OUTPATIENT
Start: 2023-04-25 | End: 2023-04-27 | Stop reason: HOSPADM

## 2023-04-25 RX ORDER — SODIUM CHLORIDE 0.9 % (FLUSH) 0.9 %
5-40 SYRINGE (ML) INJECTION PRN
Status: DISCONTINUED | OUTPATIENT
Start: 2023-04-25 | End: 2023-04-25 | Stop reason: HOSPADM

## 2023-04-25 RX ORDER — CELECOXIB 200 MG/1
200 CAPSULE ORAL 2 TIMES DAILY
Status: DISCONTINUED | OUTPATIENT
Start: 2023-04-25 | End: 2023-04-27 | Stop reason: HOSPADM

## 2023-04-25 RX ORDER — TRANEXAMIC ACID 100 MG/ML
INJECTION, SOLUTION INTRAVENOUS
Status: COMPLETED
Start: 2023-04-25 | End: 2023-04-25

## 2023-04-25 RX ORDER — ROCURONIUM BROMIDE 10 MG/ML
INJECTION, SOLUTION INTRAVENOUS PRN
Status: DISCONTINUED | OUTPATIENT
Start: 2023-04-25 | End: 2023-04-25 | Stop reason: SDUPTHER

## 2023-04-25 RX ORDER — SODIUM CHLORIDE, SODIUM LACTATE, POTASSIUM CHLORIDE, CALCIUM CHLORIDE 600; 310; 30; 20 MG/100ML; MG/100ML; MG/100ML; MG/100ML
INJECTION, SOLUTION INTRAVENOUS CONTINUOUS
Status: DISCONTINUED | OUTPATIENT
Start: 2023-04-25 | End: 2023-04-27 | Stop reason: HOSPADM

## 2023-04-25 RX ORDER — GABAPENTIN 300 MG/1
300 CAPSULE ORAL 2 TIMES DAILY
Status: DISCONTINUED | OUTPATIENT
Start: 2023-04-25 | End: 2023-04-27 | Stop reason: HOSPADM

## 2023-04-25 RX ORDER — KETOROLAC TROMETHAMINE 15 MG/ML
15 INJECTION, SOLUTION INTRAMUSCULAR; INTRAVENOUS EVERY 6 HOURS PRN
Status: DISCONTINUED | OUTPATIENT
Start: 2023-04-25 | End: 2023-04-27 | Stop reason: HOSPADM

## 2023-04-25 RX ORDER — ONDANSETRON 2 MG/ML
INJECTION INTRAMUSCULAR; INTRAVENOUS PRN
Status: DISCONTINUED | OUTPATIENT
Start: 2023-04-25 | End: 2023-04-25 | Stop reason: SDUPTHER

## 2023-04-25 RX ORDER — VITAMIN B COMPLEX
1000 TABLET ORAL 2 TIMES DAILY WITH MEALS
Status: DISCONTINUED | OUTPATIENT
Start: 2023-04-25 | End: 2023-04-27 | Stop reason: HOSPADM

## 2023-04-25 RX ORDER — FENTANYL CITRATE 50 UG/ML
25 INJECTION, SOLUTION INTRAMUSCULAR; INTRAVENOUS EVERY 5 MIN PRN
Status: COMPLETED | OUTPATIENT
Start: 2023-04-25 | End: 2023-04-25

## 2023-04-25 RX ORDER — SODIUM CHLORIDE 0.9 % (FLUSH) 0.9 %
5-40 SYRINGE (ML) INJECTION EVERY 12 HOURS SCHEDULED
Status: DISCONTINUED | OUTPATIENT
Start: 2023-04-25 | End: 2023-04-25 | Stop reason: HOSPADM

## 2023-04-25 RX ORDER — LABETALOL HYDROCHLORIDE 5 MG/ML
10 INJECTION, SOLUTION INTRAVENOUS
Status: DISCONTINUED | OUTPATIENT
Start: 2023-04-25 | End: 2023-04-25 | Stop reason: HOSPADM

## 2023-04-25 RX ORDER — SODIUM CHLORIDE 9 MG/ML
INJECTION, SOLUTION INTRAVENOUS PRN
Status: DISCONTINUED | OUTPATIENT
Start: 2023-04-25 | End: 2023-04-27 | Stop reason: HOSPADM

## 2023-04-25 RX ORDER — SODIUM CHLORIDE 0.9 % (FLUSH) 0.9 %
5-40 SYRINGE (ML) INJECTION EVERY 12 HOURS SCHEDULED
Status: DISCONTINUED | OUTPATIENT
Start: 2023-04-25 | End: 2023-04-27 | Stop reason: HOSPADM

## 2023-04-25 RX ORDER — OXYCODONE HYDROCHLORIDE 5 MG/1
10 TABLET ORAL EVERY 4 HOURS PRN
Status: DISCONTINUED | OUTPATIENT
Start: 2023-04-25 | End: 2023-04-27 | Stop reason: HOSPADM

## 2023-04-25 RX ORDER — SODIUM CHLORIDE 0.9 % (FLUSH) 0.9 %
5-40 SYRINGE (ML) INJECTION PRN
Status: DISCONTINUED | OUTPATIENT
Start: 2023-04-25 | End: 2023-04-27 | Stop reason: HOSPADM

## 2023-04-25 RX ORDER — DOCUSATE SODIUM 100 MG/1
200 CAPSULE, LIQUID FILLED ORAL DAILY
Status: DISCONTINUED | OUTPATIENT
Start: 2023-04-25 | End: 2023-04-27 | Stop reason: HOSPADM

## 2023-04-25 RX ORDER — ROPIVACAINE HYDROCHLORIDE 5 MG/ML
INJECTION, SOLUTION EPIDURAL; INFILTRATION; PERINEURAL
Status: COMPLETED | OUTPATIENT
Start: 2023-04-25 | End: 2023-04-25

## 2023-04-25 RX ORDER — HYDROMORPHONE HYDROCHLORIDE 1 MG/ML
0.5 INJECTION, SOLUTION INTRAMUSCULAR; INTRAVENOUS; SUBCUTANEOUS EVERY 5 MIN PRN
Status: COMPLETED | OUTPATIENT
Start: 2023-04-25 | End: 2023-04-25

## 2023-04-25 RX ORDER — VANCOMYCIN HYDROCHLORIDE 1 G/20ML
INJECTION, POWDER, LYOPHILIZED, FOR SOLUTION INTRAVENOUS
Status: DISPENSED
Start: 2023-04-25 | End: 2023-04-26

## 2023-04-25 RX ORDER — PROPOFOL 10 MG/ML
INJECTION, EMULSION INTRAVENOUS PRN
Status: DISCONTINUED | OUTPATIENT
Start: 2023-04-25 | End: 2023-04-25 | Stop reason: SDUPTHER

## 2023-04-25 RX ORDER — HYDROMORPHONE HCL 110MG/55ML
PATIENT CONTROLLED ANALGESIA SYRINGE INTRAVENOUS PRN
Status: DISCONTINUED | OUTPATIENT
Start: 2023-04-25 | End: 2023-04-25 | Stop reason: SDUPTHER

## 2023-04-25 RX ORDER — OXYCODONE HYDROCHLORIDE 5 MG/1
10 TABLET ORAL EVERY 4 HOURS PRN
Status: DISCONTINUED | OUTPATIENT
Start: 2023-04-25 | End: 2023-04-25 | Stop reason: SDUPTHER

## 2023-04-25 RX ORDER — POLYETHYLENE GLYCOL 3350 17 G/17G
17 POWDER, FOR SOLUTION ORAL DAILY PRN
Status: DISCONTINUED | OUTPATIENT
Start: 2023-04-25 | End: 2023-04-27 | Stop reason: HOSPADM

## 2023-04-25 RX ORDER — OXYCODONE HYDROCHLORIDE AND ACETAMINOPHEN 5; 325 MG/1; MG/1
1-2 TABLET ORAL EVERY 6 HOURS PRN
Qty: 48 TABLET | Refills: 0 | Status: SHIPPED | OUTPATIENT
Start: 2023-04-25 | End: 2023-05-01

## 2023-04-25 RX ORDER — TRANEXAMIC ACID 100 MG/ML
INJECTION, SOLUTION INTRAVENOUS PRN
Status: DISCONTINUED | OUTPATIENT
Start: 2023-04-25 | End: 2023-04-25 | Stop reason: SDUPTHER

## 2023-04-25 RX ORDER — PHENYLEPHRINE HCL IN 0.9% NACL 1 MG/10 ML
SYRINGE (ML) INTRAVENOUS PRN
Status: DISCONTINUED | OUTPATIENT
Start: 2023-04-25 | End: 2023-04-25 | Stop reason: SDUPTHER

## 2023-04-25 RX ORDER — SERTRALINE HYDROCHLORIDE 25 MG/1
25 TABLET, FILM COATED ORAL DAILY
Status: DISCONTINUED | OUTPATIENT
Start: 2023-04-25 | End: 2023-04-27 | Stop reason: HOSPADM

## 2023-04-25 RX ORDER — KETAMINE HCL IN NACL, ISO-OSM 100MG/10ML
SYRINGE (ML) INJECTION PRN
Status: DISCONTINUED | OUTPATIENT
Start: 2023-04-25 | End: 2023-04-25 | Stop reason: SDUPTHER

## 2023-04-25 RX ORDER — LIDOCAINE HYDROCHLORIDE 20 MG/ML
INJECTION, SOLUTION EPIDURAL; INFILTRATION; INTRACAUDAL; PERINEURAL PRN
Status: DISCONTINUED | OUTPATIENT
Start: 2023-04-25 | End: 2023-04-25 | Stop reason: SDUPTHER

## 2023-04-25 RX ORDER — SODIUM CHLORIDE 9 MG/ML
INJECTION, SOLUTION INTRAVENOUS CONTINUOUS PRN
Status: DISCONTINUED | OUTPATIENT
Start: 2023-04-25 | End: 2023-04-25 | Stop reason: SDUPTHER

## 2023-04-25 RX ORDER — LEVOTHYROXINE SODIUM 0.1 MG/1
100 TABLET ORAL DAILY
Status: DISCONTINUED | OUTPATIENT
Start: 2023-04-25 | End: 2023-04-27 | Stop reason: HOSPADM

## 2023-04-25 RX ORDER — OXYCODONE HYDROCHLORIDE 5 MG/1
5 TABLET ORAL EVERY 4 HOURS PRN
Status: DISCONTINUED | OUTPATIENT
Start: 2023-04-25 | End: 2023-04-25 | Stop reason: SDUPTHER

## 2023-04-25 RX ORDER — ONDANSETRON 2 MG/ML
4 INJECTION INTRAMUSCULAR; INTRAVENOUS EVERY 6 HOURS PRN
Status: DISCONTINUED | OUTPATIENT
Start: 2023-04-25 | End: 2023-04-27 | Stop reason: HOSPADM

## 2023-04-25 RX ORDER — PROCHLORPERAZINE EDISYLATE 5 MG/ML
5 INJECTION INTRAMUSCULAR; INTRAVENOUS
Status: DISCONTINUED | OUTPATIENT
Start: 2023-04-25 | End: 2023-04-25 | Stop reason: HOSPADM

## 2023-04-25 RX ORDER — SODIUM CHLORIDE 9 MG/ML
INJECTION, SOLUTION INTRAVENOUS PRN
Status: DISCONTINUED | OUTPATIENT
Start: 2023-04-25 | End: 2023-04-25 | Stop reason: HOSPADM

## 2023-04-25 RX ORDER — OXYCODONE HYDROCHLORIDE 5 MG/1
5 TABLET ORAL PRN
Status: DISCONTINUED | OUTPATIENT
Start: 2023-04-25 | End: 2023-04-25 | Stop reason: HOSPADM

## 2023-04-25 RX ORDER — OXYCODONE HYDROCHLORIDE 5 MG/1
10 TABLET ORAL PRN
Status: DISCONTINUED | OUTPATIENT
Start: 2023-04-25 | End: 2023-04-25 | Stop reason: HOSPADM

## 2023-04-25 RX ORDER — BUSPIRONE HYDROCHLORIDE 10 MG/1
10 TABLET ORAL 3 TIMES DAILY
Status: DISCONTINUED | OUTPATIENT
Start: 2023-04-25 | End: 2023-04-27 | Stop reason: HOSPADM

## 2023-04-25 RX ORDER — OXYCODONE HYDROCHLORIDE 5 MG/1
5 TABLET ORAL EVERY 4 HOURS PRN
Status: DISCONTINUED | OUTPATIENT
Start: 2023-04-25 | End: 2023-04-27 | Stop reason: HOSPADM

## 2023-04-25 RX ORDER — VANCOMYCIN HYDROCHLORIDE 1 G/20ML
INJECTION, POWDER, LYOPHILIZED, FOR SOLUTION INTRAVENOUS PRN
Status: DISCONTINUED | OUTPATIENT
Start: 2023-04-25 | End: 2023-04-25 | Stop reason: ALTCHOICE

## 2023-04-25 RX ORDER — ONDANSETRON 4 MG/1
4 TABLET, ORALLY DISINTEGRATING ORAL EVERY 8 HOURS PRN
Status: DISCONTINUED | OUTPATIENT
Start: 2023-04-25 | End: 2023-04-27 | Stop reason: HOSPADM

## 2023-04-25 RX ORDER — MIDAZOLAM HYDROCHLORIDE 1 MG/ML
2 INJECTION INTRAMUSCULAR; INTRAVENOUS ONCE
Status: DISCONTINUED | OUTPATIENT
Start: 2023-04-25 | End: 2023-04-27 | Stop reason: HOSPADM

## 2023-04-25 RX ORDER — ONDANSETRON 2 MG/ML
4 INJECTION INTRAMUSCULAR; INTRAVENOUS
Status: DISCONTINUED | OUTPATIENT
Start: 2023-04-25 | End: 2023-04-25 | Stop reason: HOSPADM

## 2023-04-25 RX ORDER — BUSPIRONE HYDROCHLORIDE 10 MG/1
10 TABLET ORAL 3 TIMES DAILY PRN
Status: DISCONTINUED | OUTPATIENT
Start: 2023-04-25 | End: 2023-04-25

## 2023-04-25 RX ORDER — ACETAMINOPHEN 500 MG
1000 TABLET ORAL EVERY 6 HOURS
Status: DISCONTINUED | OUTPATIENT
Start: 2023-04-25 | End: 2023-04-27 | Stop reason: HOSPADM

## 2023-04-25 RX ADMIN — FENTANYL CITRATE 50 MCG: 50 INJECTION INTRAMUSCULAR; INTRAVENOUS at 13:42

## 2023-04-25 RX ADMIN — GABAPENTIN 300 MG: 300 CAPSULE ORAL at 20:51

## 2023-04-25 RX ADMIN — FENTANYL CITRATE 50 MCG: 50 INJECTION INTRAMUSCULAR; INTRAVENOUS at 15:36

## 2023-04-25 RX ADMIN — FENTANYL CITRATE 50 MCG: 50 INJECTION INTRAMUSCULAR; INTRAVENOUS at 14:50

## 2023-04-25 RX ADMIN — HYDROMORPHONE HYDROCHLORIDE 0.5 MG: 2 INJECTION, SOLUTION INTRAMUSCULAR; INTRAVENOUS; SUBCUTANEOUS at 15:52

## 2023-04-25 RX ADMIN — BUSPIRONE HYDROCHLORIDE 10 MG: 10 TABLET ORAL at 20:50

## 2023-04-25 RX ADMIN — HYDROMORPHONE HYDROCHLORIDE 0.5 MG: 1 INJECTION, SOLUTION INTRAMUSCULAR; INTRAVENOUS; SUBCUTANEOUS at 16:22

## 2023-04-25 RX ADMIN — CELECOXIB 200 MG: 200 CAPSULE ORAL at 20:51

## 2023-04-25 RX ADMIN — Medication 200 MCG: at 15:13

## 2023-04-25 RX ADMIN — PROPOFOL 150 MG: 10 INJECTION, EMULSION INTRAVENOUS at 13:44

## 2023-04-25 RX ADMIN — ROPIVACAINE HYDROCHLORIDE 20 ML: 5 INJECTION EPIDURAL; INFILTRATION; PERINEURAL at 11:32

## 2023-04-25 RX ADMIN — ONDANSETRON 4 MG: 2 INJECTION INTRAMUSCULAR; INTRAVENOUS at 15:20

## 2023-04-25 RX ADMIN — FENTANYL CITRATE 25 MCG: 50 INJECTION, SOLUTION INTRAMUSCULAR; INTRAVENOUS at 16:43

## 2023-04-25 RX ADMIN — ACETAMINOPHEN 1000 MG: 500 TABLET ORAL at 20:50

## 2023-04-25 RX ADMIN — OXYCODONE 10 MG: 5 TABLET ORAL at 23:04

## 2023-04-25 RX ADMIN — HYDROMORPHONE HYDROCHLORIDE 0.5 MG: 1 INJECTION, SOLUTION INTRAMUSCULAR; INTRAVENOUS; SUBCUTANEOUS at 17:12

## 2023-04-25 RX ADMIN — FENTANYL CITRATE 25 MCG: 50 INJECTION, SOLUTION INTRAMUSCULAR; INTRAVENOUS at 16:36

## 2023-04-25 RX ADMIN — PROPOFOL 20 MG: 10 INJECTION, EMULSION INTRAVENOUS at 15:43

## 2023-04-25 RX ADMIN — Medication 1000 UNITS: at 20:51

## 2023-04-25 RX ADMIN — FENTANYL CITRATE 50 MCG: 50 INJECTION INTRAMUSCULAR; INTRAVENOUS at 14:12

## 2023-04-25 RX ADMIN — Medication 10 MG: at 15:50

## 2023-04-25 RX ADMIN — SODIUM CHLORIDE: 9 INJECTION, SOLUTION INTRAVENOUS at 15:17

## 2023-04-25 RX ADMIN — ROCURONIUM BROMIDE 10 MG: 10 INJECTION, SOLUTION INTRAVENOUS at 14:33

## 2023-04-25 RX ADMIN — Medication 25 MG: at 14:04

## 2023-04-25 RX ADMIN — SODIUM CHLORIDE, POTASSIUM CHLORIDE, SODIUM LACTATE AND CALCIUM CHLORIDE: 600; 310; 30; 20 INJECTION, SOLUTION INTRAVENOUS at 18:16

## 2023-04-25 RX ADMIN — TRANEXAMIC ACID 1000 MG: 100 INJECTION, SOLUTION INTRAVENOUS at 15:20

## 2023-04-25 RX ADMIN — Medication 2000 MG: at 13:53

## 2023-04-25 RX ADMIN — LIDOCAINE HYDROCHLORIDE 60 MG: 20 INJECTION, SOLUTION EPIDURAL; INFILTRATION; INTRACAUDAL; PERINEURAL at 13:44

## 2023-04-25 RX ADMIN — SODIUM CHLORIDE, POTASSIUM CHLORIDE, SODIUM LACTATE AND CALCIUM CHLORIDE: 600; 310; 30; 20 INJECTION, SOLUTION INTRAVENOUS at 10:35

## 2023-04-25 RX ADMIN — ROCURONIUM BROMIDE 40 MG: 10 INJECTION, SOLUTION INTRAVENOUS at 13:44

## 2023-04-25 RX ADMIN — PROPOFOL 30 MG: 10 INJECTION, EMULSION INTRAVENOUS at 15:19

## 2023-04-25 RX ADMIN — Medication 15 MG: at 15:19

## 2023-04-25 RX ADMIN — TRANEXAMIC ACID 1000 MG: 100 INJECTION, SOLUTION INTRAVENOUS at 13:59

## 2023-04-25 RX ADMIN — SUGAMMADEX 200 MG: 100 INJECTION, SOLUTION INTRAVENOUS at 15:40

## 2023-04-25 RX ADMIN — HYDROMORPHONE HYDROCHLORIDE 0.5 MG: 1 INJECTION, SOLUTION INTRAMUSCULAR; INTRAVENOUS; SUBCUTANEOUS at 16:51

## 2023-04-25 RX ADMIN — HYDROMORPHONE HYDROCHLORIDE 0.5 MG: 1 INJECTION, SOLUTION INTRAMUSCULAR; INTRAVENOUS; SUBCUTANEOUS at 16:13

## 2023-04-25 RX ADMIN — OXYCODONE 10 MG: 5 TABLET ORAL at 18:14

## 2023-04-25 ASSESSMENT — PAIN SCALES - GENERAL
PAINLEVEL_OUTOF10: 10
PAINLEVEL_OUTOF10: 6
PAINLEVEL_OUTOF10: 10
PAINLEVEL_OUTOF10: 8
PAINLEVEL_OUTOF10: 10
PAINLEVEL_OUTOF10: 10
PAINLEVEL_OUTOF10: 9
PAINLEVEL_OUTOF10: 10
PAINLEVEL_OUTOF10: 4

## 2023-04-25 ASSESSMENT — PAIN - FUNCTIONAL ASSESSMENT
PAIN_FUNCTIONAL_ASSESSMENT: PREVENTS OR INTERFERES SOME ACTIVE ACTIVITIES AND ADLS
PAIN_FUNCTIONAL_ASSESSMENT: 0-10
PAIN_FUNCTIONAL_ASSESSMENT: PREVENTS OR INTERFERES SOME ACTIVE ACTIVITIES AND ADLS

## 2023-04-25 ASSESSMENT — PAIN DESCRIPTION - DESCRIPTORS
DESCRIPTORS: THROBBING
DESCRIPTORS: THROBBING
DESCRIPTORS: THROBBING;ACHING
DESCRIPTORS: BURNING;STABBING

## 2023-04-25 ASSESSMENT — PAIN DESCRIPTION - ORIENTATION
ORIENTATION: LEFT

## 2023-04-25 ASSESSMENT — PAIN DESCRIPTION - LOCATION
LOCATION: KNEE

## 2023-04-25 ASSESSMENT — PAIN DESCRIPTION - FREQUENCY
FREQUENCY: INTERMITTENT
FREQUENCY: INTERMITTENT

## 2023-04-25 ASSESSMENT — PAIN DESCRIPTION - ONSET: ONSET: ON-GOING

## 2023-04-25 NOTE — CONSULTS
[facility]  Office: [address], DO, Ciro Tanner, DO, Mane Augustin, DO, Alvino Lainez Blood, DO, Nancy Chow MD, Kirstie Moreno MD, Liam Thomas MD, Kenisha Garcia MD,  Yusra Osorio MD, Isak Herrera MD, Wily Blackwell, DO, Patricia Pringle MD,  Nick Jaime MD, Arlin Raya MD, Cherrie Tao DO, Boom Mc MD, Chandler Lombard, MD, Bruna Moon DO, Elle Delgado MD, Chiqui Macias MD, Carolyn Awan MD, Davidson Mendoza MD,  Alirio Jeter DO, Tonya Lopez MD,  Evi Rod CNP,  Dina Bess, CNP, Carmelo Escoto, CNP, Elba Lion, CNP,  Michoacano Hanson, [facility], Ryan Curtis, CNP, Paula Ames, CNP, Jose Wilson, CNP, Jani Ahumada, CNP, Gautam David, CNP, Sulaiman Pozo PA-C, Carissa Gold, CNS, Pamela Navarro, CNP, Marichuy Rosario [facility] / HISTORY AND PHYSICAL EXAMINATION            Date:   4/25/2023  Patient name:  Mandeep Angeles  Date of admission:  4/25/2023  9:56 AM  MRN:   [id number]  Account:  [de-identified]  YOB: 1955  PCP:    MARIA M Iqbal CNP  Room:   2006/2006-02  Code Status:    Full Code    Physician Requesting Consult: Eric Deal DO    Reason for Consult: Postoperative medical management at request of Dr. Pat Mcdonald    Chief Complaint:     Infected knee arthroplasty    History Obtained From:     patient    History of Present Illness: This is a 17-year-old female that presents with swelling of her left knee with findings concerning for infected arthroplasty. She underwent knee replacement surgery with Dr. Slava Carson on December 7, 2022 which was initially uneventful. At her postop visit she had severe discoloration of her incision and was reassured that the findings were consistent with postoperative inflammation. By the end of December she had increased pain and swelling and dehiscence of her wound.   She

## 2023-04-25 NOTE — CONSULTS
[address] [facility] Pharmacokinetic Monitoring Service - Vancomycin     Timi Loving is a 79 y.o. female starting on vancomycin therapy for SSTI. Pharmacy consulted by Dr. Sybil Vicente for monitoring and adjustment. Target Concentration: Goal trough of 10-15 mg/L and AUC/FRANCISCO JAVIER <500 mg*hr/L    Additional Antimicrobials: Cefazolin times one dose. Pertinent Laboratory Values: Wt Readings from Last 1 Encounters:   04/25/23 208 lb (94.3 kg)     Temp Readings from Last 1 Encounters:   04/25/23 97.3 °F (36.3 °C) (Oral)     Estimated Creatinine Clearance: 78 mL/min (based on SCr of 0.88 mg/dL). No results for input(s): CREATININE, WBC in the last 72 hours. Invalid input(s):  BUN  Procalcitonin: --    Pertinent Cultures:  Culture Date Source Results   4/25/23 Tissue - knee joint Pending   MRSA Nasal Swab: N/A. Non-respiratory infection. Plan:  Dosing recommendations based on Bayesian software  -  Noted:   A 1000 mg dose was given in OR 4/25/23 13:30  Start vancomycin 2000 mg times one dose, followed by 750 mg q12h.      Anticipated AUC of 409 and trough concentration of 13.6 at steady state  Renal labs as indicated   Vancomycin concentration ordered for 4/26 @ 1900   Pharmacy will continue to monitor patient and adjust therapy as indicated    Thank you for the consult,  Vikki Mckeon, [address] [place] [address]  4/25/2023 6:50 PM

## 2023-04-25 NOTE — ANESTHESIA PROCEDURE NOTES
Peripheral Block    Patient location during procedure: pre-op  Reason for block: post-op pain management and at surgeon's request  Start time: 4/25/2023 11:32 AM  End time: 4/25/2023 11:35 AM  Staffing  Performed: anesthesiologist   Anesthesiologist: Florina Chau MD  Preanesthetic Checklist  Completed: patient identified, IV checked, site marked, risks and benefits discussed, surgical/procedural consents, equipment checked, pre-op evaluation, timeout performed, anesthesia consent given, oxygen available and monitors applied/VS acknowledged  Peripheral Block   Patient position: supine  Prep: ChloraPrep  Provider prep: mask and sterile gloves  Patient monitoring: cardiac monitor, continuous pulse ox, frequent blood pressure checks and IV access  Block type: Femoral  Adductor canal  Laterality: left  Injection technique: single-shot  Guidance: ultrasound guided  Local infiltration: lidocaine  Infiltration strength: 1 %  Local infiltration: lidocaine  Dose: 3 mL    Needle   Needle type: insulated echogenic nerve stimulator needle   Needle gauge: 21 G  Needle localization: ultrasound guidance  Needle length: 10 cm  Assessment   Injection assessment: negative aspiration for heme, no paresthesia on injection, local visualized surrounding nerve on ultrasound and no intravascular symptoms  Paresthesia pain: none  Slow fractionated injection: yes  Hemodynamics: stable  Real-time US image taken/store: yes  Outcomes: uncomplicated and patient tolerated procedure well    Additional Notes  Diagnosis: Acute post-operative pain of the anterior/medial lower extremity (Left)  Medications Administered  dexamethasone (DECADRON) injection 4 mg/mL - Perineural   4 mg - 4/25/2023 11:32:00 AM  ropivacaine (NAROPIN) injection 0.5% - Perineural   20 mL - 4/25/2023 11:32:00 AM

## 2023-04-25 NOTE — DISCHARGE INSTR - COC
Continuity of Care Form    Patient Name: Fior Ragsdale   :  1955  MRN:  [id number]    Admit date:  (Not on file)  Discharge date:  2023     Code Status Order: Prior   Advance Directives:     Admitting Physician:  Daphnie Tapia DO  PCP: MARIA M Carrasquillo CNP    Discharging Nurse: [facility] Unit/Room#: No information available for this encounter. Discharging Unit Phone Number: [phone]    Emergency Contact:   Extended Emergency Contact Information  Primary Emergency Contact: [address] Phone: [phone]  Relation: Child    Past Surgical History:  Past Surgical History:   Procedure Laterality Date    CHOLECYSTECTOMY      HIP FRACTURE SURGERY Right 2016    closed reduction percutaneous pinning    HYSTERECTOMY (CERVIX STATUS UNKNOWN)      SPINE SURGERY      L4/L5       Immunization History: There is no immunization history on file for this patient. Active Problems:  Patient Active Problem List   Diagnosis Code    Chronic back pain M54.9, G89.29    Varicose veins of legs I83.93    Renal insufficiency N28.9    Acquired hypothyroidism E03.9    Status post cholecystectomy Z90.49    History of bilateral hip replacements Z96.643    Shortness of breath on exertion R06.02    Vitamin D deficiency E55.9    Pulmonary nodule R91.1    Hypokalemia E87.6    Chest pain R07.9    Delayed surgical wound healing T81.89XA       Isolation/Infection:   Isolation            No Isolation          Patient Infection Status       None to display            Nurse Assessment:  Last Vital Signs: There were no vitals taken for this visit.     Last documented pain score (0-10 scale):    Last Weight:   Wt Readings from Last 1 Encounters:   23 208 lb (94.3 kg)     Mental Status:  oriented and alert    IV Access:  - PICC - site  R Upper Arm, insertion date:     Nursing Mobility/ADLs:  Walking   Assisted  Transfer  Assisted  Bathing  Assisted  Dressing

## 2023-04-25 NOTE — H&P
Interval H&P Note    Pt Name: Tamia Wright  MRN: [id number]  YOB: 1955  Date of evaluation: 4/25/2023      [x] I have reviewed in Epic the orthopedic progress note by Dr. Noel Evans dated 04/17/2023, attached below for an Interval History and Physical note. [x] I have examined  Tamia Wright  There are no changes to the patient who is scheduled for LEFT KNEE   REMOVAL OF INFECTED PROSTHESIS AND PLACEMENT OF SPACER- ORTHO REMEDIES, ED by Carli Amos, DO for Joint prosthesis infection or inflammation, sequela. The patient denies new health changes, fever, chills, wheezing, cough, increased SOB, chest pain, open sores or wounds. Denies hx of diabetes. Last Mobic and Vitamin D 04/24/2023. Vital signs: BP (!) 130/99   Pulse 81   Temp 97.1 °F (36.2 °C) (Temporal)   Resp 20   Ht 5' 10\" (1.778 m)   Wt 208 lb (94.3 kg)   SpO2 99%   BMI 29.84 kg/m²     Allergies:  Patient has no known allergies. Medications:    Prior to Admission medications    Medication Sig Start Date End Date Taking? Authorizing Provider   busPIRone (BUSPAR) 10 MG tablet TAKE 1 TO 2 TABLETS BY MOUTH THREE TIMES DAILY AS NEEDED 4/17/23   MARIA M Macario CNP   doxycycline hyclate (VIBRA-TABS) 100 MG tablet Take 1 tablet by mouth 2 times daily 4/3/23 5/3/23  Kalin Sheets MD   vitamin D 25 MCG (1000 UT) CAPS Take 1 capsule by mouth 2 times daily (with meals) 11/28/22   Historical Provider, MD   levothyroxine (SYNTHROID) 100 MCG tablet Take 1 tablet by mouth once daily 3/20/23   MARIA M Macario CNP   Potassium 99 MG TABS Take by mouth    Historical Provider, MD   sertraline (ZOLOFT) 25 MG tablet Take 1 tablet by mouth once daily 1/12/23   MARIA M Macario CNP   morphine (MS CONTIN) 15 MG extended release tablet Take 1 tablet by mouth 2 times daily.     Historical Provider, MD   docusate sodium (COLACE) 100 MG capsule Take 2 capsules by mouth daily    Historical Provider, MD   gabapentin (NEURONTIN)

## 2023-04-25 NOTE — DISCHARGE INSTRUCTIONS
Orthopaedic Instructions:  -Weight bearing status: 30 lbs flat foot weight bearing using a walker to the left lower extremity  -Do not remove dressings or wound vac until your post-operative follow up visit. If prevena dies then transition to dressings provided.   -Always look for signs of compartment syndrome: pain out of proportion to the injury, pain not controlled with pain medication, numbness in digits, changing of color of digits (paleness). If these signs occur return to ED immediately for reassessment.  -Ice (20 minutes on and off 1 hour) and elevate above the level of the heart to reduce swelling and throbbing pain.  -Call the office or come to Emergency Room if signs of infection appear (hot, swollen, red, draining pus, fever)  -Take medications as prescribed.  -Wean off narcotics (percocet/norco) as soon as possible. Do not take tylenol if still taking narcotics.  -Follow up with Dr. Lesley Ervin in his office 10-14 days after surgery. Call [phone] to schedule/confirm. Keep it Clean - Post-Operative Home instructions    These instructions are to help you have the best possible recovery after your surgical procedure. Pavan Garcia is here to support you. If you have questions, call [phone] Monday through Friday from 7:30AM to 8:30PM to speak to a nurse. If you need to speak to someone outside of these hours, call your physician. Incision Dos and Donts  Do wash hands before and after dressing changes or when you have had any contact with your incision. Use hand  or antibacterial soap. Do keep your incision clean and dry. Its OK to wash the skin around your incision with mild soap and water. Do change your dressing as you were told. Do notify your doctor if the dressing becomes wet or dirty. Do use a clean washcloth every time when cleaning your incision. Do sleep on clean linens. Do keep pets away from incision site.   Dont sit in a bathtub, pool, or hot tub until your incision

## 2023-04-25 NOTE — ANESTHESIA PRE PROCEDURE
Department of Anesthesiology  Preprocedure Note       Name:  Thompson Kennedy   Age:  79 y.o.  :  1955                                          MRN:  [id number]         Date:  2023      Surgeon: Noemy Guallpa):  Norma Figueredo DO    Procedure: Procedure(s):  LEFT KNEE   REMOVAL OF INFECTED PROSTHESIS AND PLACEMENT OF SPACER- ORTHO REMEDIES, ED    Medications prior to admission:   Prior to Admission medications    Medication Sig Start Date End Date Taking? Authorizing Provider   busPIRone (BUSPAR) 10 MG tablet TAKE 1 TO 2 TABLETS BY MOUTH THREE TIMES DAILY AS NEEDED 23   MARIA M Deluca CNP   doxycycline hyclate (VIBRA-TABS) 100 MG tablet Take 1 tablet by mouth 2 times daily 4/3/23 5/3/23  Laura Smith MD   vitamin D 25 MCG (1000 UT) CAPS Take 1 capsule by mouth 2 times daily (with meals) 22   Historical Provider, MD   levothyroxine (SYNTHROID) 100 MCG tablet Take 1 tablet by mouth once daily 3/20/23   MARIA M Deluca CNP   Potassium 99 MG TABS Take by mouth    Historical Provider, MD   sertraline (ZOLOFT) 25 MG tablet Take 1 tablet by mouth once daily 23   MARIA M Deluca CNP   morphine (MS CONTIN) 15 MG extended release tablet Take 1 tablet by mouth 2 times daily. Historical Provider, MD   docusate sodium (COLACE) 100 MG capsule Take 2 capsules by mouth daily    Historical Provider, MD   gabapentin (NEURONTIN) 300 MG capsule Take 1 capsule by mouth 2 times daily.     Historical Provider, MD   meloxicam (MOBIC) 7.5 MG tablet Take 1 tablet by mouth daily as needed for Pain 22   MARIA M Deluca CNP   atenolol (TENORMIN) 25 MG tablet Take 1 tablet by mouth daily    Historical Provider, MD       Current medications:    Current Facility-Administered Medications   Medication Dose Route Frequency Provider Last Rate Last Admin    lidocaine 1% (buffered) injection 1 mL  1 mL Infiltration Once Louise Waterman MD        lactated ringers IV soln infusion

## 2023-04-25 NOTE — BRIEF OP NOTE
Brief Postoperative Note      Patient: Fior Ragsdale  YOB: 1955  MRN: [id number]    Date of Procedure: 4/25/2023    Pre-Op Diagnosis Codes:  Left knee periprosthetic joint infection    Post-Op Diagnosis: Same       Procedure(s):  Left knee removal of deep implant of infected prosthesis with antibiotic spacer placement  Left knee sharp irrigation and debridement using, knife, electrocautery, rongeur, and curettes of skin, subcutaneous tissue, fascia, muscle, and bone. Left knee application of wound vac    Surgeon(s):  Daphnie Tapia DO    Assistant:  Surgical Assistant: Elizabeth Mckeon  Resident: Debbie Pierre DO    Anesthesia: General    Estimated Blood Loss (mL): 250 cc's    Fluids: 1,400 mL crystalloids    Complications: None    Specimens:   ID Type Source Tests Collected by Time Destination   1 : LEFT KNEE #1 Tissue Joint, Knee CULTURE, ANAEROBIC AND AEROBIC Tennis Regina, DO 4/25/2023 1416    2 : LEFT KNEE #2 Tissue Joint, Knee CULTURE, ANAEROBIC AND AEROBIC Tennis Regina, DO 4/25/2023 1417    3 : LEFT KNEE #3 Tissue Joint, Knee CULTURE, ANAEROBIC AND AEROBIC Tennis Regina, DO 4/25/2023 1417    4 : LEFT KNEE #4 Tissue Joint, Knee CULTURE, ANAEROBIC AND AEROBIC Tennis Regina, DO 4/25/2023 1418        Implants:  Implant Name Type Inv.  Item Serial No.  Lot No. LRB No. Used Action   CEMENT BNE 40GM W/ GENT HI VISC RADPQ FOR REV SURG - [id number]  CEMENT BNE 40GM W/ GENT HI VISC RADPQ FOR REV SURG  ED [employer]-WD [id number] Left 2 Implanted   EXTENSION STEM 100 MM KNEE REMEDY - [id number]  EXTENSION STEM 100 MM KNEE REMEDY  OSTEOREMEDIES-WD [id number] Left 1 Implanted   COMPONENT FEM STEM LG KNEE REMEDY - [id number]  COMPONENT FEM STEM LG KNEE REMEDY  OSTEOREMEDIES-WD [id number] Left 1 Implanted   CEMENT BNE 40GM HI VISC RADPQ FOR REV SURG - [id number]  CEMENT BNE 40GM HI VISC RADPQ FOR REV SURG  ED BIOMET ORTHOPED[employer]-WD [id number] Left 1

## 2023-04-25 NOTE — PLAN OF CARE
PROTOCOLS  NURSING IMPLEMENTED    TOTAL JOINT DVT/PE  VENOUS THROMBOEMBOLISM PROPHYLAXIS  (Nursing Automatically Implement)    Kylie Molina  [id number]  [unfilled]  4/24/23    YES DVT RISK FACTOR SCORE YES MAJOR BLEEDING RISK FACTORS SCORE     [x] 48years old or greater (1)   [] Hx. Easy Bleeding (1)      [] Heart failure (2)   [] NSAID Use in Last 5 Days (2)      [x] Varicose veins - Hx. (1)   [] Gastrointestinal or Genitourinary bleeding in Last 14 Days (2)      [] Myocardial Infarction - Hx. (1)         [] Cancer - Hx. (2)         [] Atrial fibrillation - Hx. (1)         [] Ischemic Stroke - Hx. (1)         [] Diabetes Mellitus - Hx. (1)         [] Previous DVT/PE - Hx.  (2)         [] Hormone Replacement Therapy (1)         [] Obesity (1)         [] Paralysis (1)         [] Pregnancy (1)         [] Smoking (1)                   [] Thromophilia (1)   []   Mild to Moderate Bleeding (2)      [] Total Hip Arthroplasty (1)   [] Active Bleeding (4)      [] Family history of PE or DVT? (4) (Consider the following labs to test for presence of inhibitor deficiency state:) Factor V Leiden, Prothrombin Gene Mutation, Protein S Deficiency, Protien C Deficiency, Antithrombin Deficiency   [] Malignant Hypertension (2)        [] Thrombocytopenia 20k to 100k (2)        [] Thrombocytopenia less than 20k (4)        [] Bleeding Diathesis (4)        [] \"Bloody Stick\" Epidural or Spinal (2)     TOTAL DVT SCORE   TOTAL BLEEDING SCORE      [x] CLASS A   Standard Risk DVT (0-3)    [] CLASS X Standard Risk Bleeding (0-4)      [] CLASS B Elevated Risk DVT (greater than 3)    [] CLASS Y High Risk Bleeding (greater than 4)     FINAL MATRIX (e.g. AY)       *If allergic to ASA use Warfarin  *BY patient consider no treatment  **Consider venous filter with high risk PE  **If on Coumadin pre-op, then restart night of surgery      []  DVT Prophylaxis: Class AX, AY    Ecotrin 81 mg by mouth BID starting day of surgery for 6 weeks for all total

## 2023-04-26 ENCOUNTER — APPOINTMENT (OUTPATIENT)
Dept: INTERVENTIONAL RADIOLOGY/VASCULAR | Age: 68
End: 2023-04-26
Attending: ORTHOPAEDIC SURGERY
Payer: MEDICARE

## 2023-04-26 PROBLEM — T84.50XS: Status: ACTIVE | Noted: 2023-04-26

## 2023-04-26 LAB
BUN SERPL-MCNC: 17 MG/DL (ref 8–23)
CREAT SERPL-MCNC: 0.87 MG/DL (ref 0.5–0.9)
GFR SERPL CREATININE-BSD FRML MDRD: >60 ML/MIN/1.73M2
VANCOMYCIN RANDOM: 18.1 UG/ML

## 2023-04-26 PROCEDURE — C1751 CATH, INF, PER/CENT/MIDLINE: HCPCS

## 2023-04-26 PROCEDURE — 97530 THERAPEUTIC ACTIVITIES: CPT

## 2023-04-26 PROCEDURE — 99232 SBSQ HOSP IP/OBS MODERATE 35: CPT | Performed by: INTERNAL MEDICINE

## 2023-04-26 PROCEDURE — G0378 HOSPITAL OBSERVATION PER HR: HCPCS

## 2023-04-26 PROCEDURE — 80202 ASSAY OF VANCOMYCIN: CPT

## 2023-04-26 PROCEDURE — 27488 REMOVAL OF KNEE PROSTHESIS: CPT | Performed by: ORTHOPAEDIC SURGERY

## 2023-04-26 PROCEDURE — 36573 INSJ PICC RS&I 5 YR+: CPT

## 2023-04-26 PROCEDURE — 97116 GAIT TRAINING THERAPY: CPT

## 2023-04-26 PROCEDURE — 36415 COLL VENOUS BLD VENIPUNCTURE: CPT

## 2023-04-26 PROCEDURE — 97162 PT EVAL MOD COMPLEX 30 MIN: CPT

## 2023-04-26 PROCEDURE — 96375 TX/PRO/DX INJ NEW DRUG ADDON: CPT

## 2023-04-26 PROCEDURE — 6360000002 HC RX W HCPCS

## 2023-04-26 PROCEDURE — 99222 1ST HOSP IP/OBS MODERATE 55: CPT | Performed by: NURSE PRACTITIONER

## 2023-04-26 PROCEDURE — 6370000000 HC RX 637 (ALT 250 FOR IP)

## 2023-04-26 PROCEDURE — 97166 OT EVAL MOD COMPLEX 45 MIN: CPT

## 2023-04-26 PROCEDURE — 6360000002 HC RX W HCPCS: Performed by: INTERNAL MEDICINE

## 2023-04-26 PROCEDURE — 84520 ASSAY OF UREA NITROGEN: CPT

## 2023-04-26 PROCEDURE — 2580000003 HC RX 258: Performed by: NURSE PRACTITIONER

## 2023-04-26 PROCEDURE — 82565 ASSAY OF CREATININE: CPT

## 2023-04-26 PROCEDURE — 2580000003 HC RX 258

## 2023-04-26 PROCEDURE — 6360000002 HC RX W HCPCS: Performed by: NURSE PRACTITIONER

## 2023-04-26 PROCEDURE — 97535 SELF CARE MNGMENT TRAINING: CPT

## 2023-04-26 PROCEDURE — 2580000003 HC RX 258: Performed by: INTERNAL MEDICINE

## 2023-04-26 RX ORDER — DOXYCYCLINE HYCLATE 100 MG
100 TABLET ORAL 2 TIMES DAILY
Status: ON HOLD | COMMUNITY
End: 2023-04-27 | Stop reason: HOSPADM

## 2023-04-26 RX ADMIN — CELECOXIB 200 MG: 200 CAPSULE ORAL at 20:22

## 2023-04-26 RX ADMIN — ATENOLOL 25 MG: 25 TABLET ORAL at 08:45

## 2023-04-26 RX ADMIN — KETOROLAC TROMETHAMINE 15 MG: 15 INJECTION, SOLUTION INTRAMUSCULAR; INTRAVENOUS at 21:27

## 2023-04-26 RX ADMIN — BUSPIRONE HYDROCHLORIDE 10 MG: 10 TABLET ORAL at 08:45

## 2023-04-26 RX ADMIN — APIXABAN 2.5 MG: 2.5 TABLET, FILM COATED ORAL at 20:23

## 2023-04-26 RX ADMIN — VANCOMYCIN HYDROCHLORIDE 750 MG: 750 INJECTION, POWDER, LYOPHILIZED, FOR SOLUTION INTRAVENOUS at 14:07

## 2023-04-26 RX ADMIN — OXYCODONE 10 MG: 5 TABLET ORAL at 20:22

## 2023-04-26 RX ADMIN — GABAPENTIN 300 MG: 300 CAPSULE ORAL at 20:23

## 2023-04-26 RX ADMIN — ACETAMINOPHEN 1000 MG: 500 TABLET ORAL at 01:57

## 2023-04-26 RX ADMIN — Medication 1000 UNITS: at 17:13

## 2023-04-26 RX ADMIN — OXYCODONE 10 MG: 5 TABLET ORAL at 04:56

## 2023-04-26 RX ADMIN — SODIUM CHLORIDE, PRESERVATIVE FREE 10 ML: 5 INJECTION INTRAVENOUS at 08:45

## 2023-04-26 RX ADMIN — DOCUSATE SODIUM 200 MG: 100 CAPSULE, LIQUID FILLED ORAL at 08:44

## 2023-04-26 RX ADMIN — GABAPENTIN 300 MG: 300 CAPSULE ORAL at 08:45

## 2023-04-26 RX ADMIN — CEFTRIAXONE SODIUM 2000 MG: 2 INJECTION, POWDER, FOR SOLUTION INTRAMUSCULAR; INTRAVENOUS at 13:02

## 2023-04-26 RX ADMIN — ACETAMINOPHEN 1000 MG: 500 TABLET ORAL at 13:02

## 2023-04-26 RX ADMIN — OXYCODONE 10 MG: 5 TABLET ORAL at 08:48

## 2023-04-26 RX ADMIN — VANCOMYCIN HYDROCHLORIDE 2000 MG: 1 INJECTION, POWDER, LYOPHILIZED, FOR SOLUTION INTRAVENOUS at 01:59

## 2023-04-26 RX ADMIN — SODIUM CHLORIDE, POTASSIUM CHLORIDE, SODIUM LACTATE AND CALCIUM CHLORIDE: 600; 310; 30; 20 INJECTION, SOLUTION INTRAVENOUS at 04:57

## 2023-04-26 RX ADMIN — APIXABAN 2.5 MG: 2.5 TABLET, FILM COATED ORAL at 08:45

## 2023-04-26 RX ADMIN — Medication 1000 UNITS: at 08:45

## 2023-04-26 RX ADMIN — CELECOXIB 200 MG: 200 CAPSULE ORAL at 08:45

## 2023-04-26 RX ADMIN — ACETAMINOPHEN 1000 MG: 500 TABLET ORAL at 06:59

## 2023-04-26 RX ADMIN — BUSPIRONE HYDROCHLORIDE 10 MG: 10 TABLET ORAL at 14:07

## 2023-04-26 RX ADMIN — SODIUM CHLORIDE, PRESERVATIVE FREE 10 ML: 5 INJECTION INTRAVENOUS at 20:24

## 2023-04-26 RX ADMIN — SERTRALINE 25 MG: 25 TABLET, FILM COATED ORAL at 08:44

## 2023-04-26 RX ADMIN — BUSPIRONE HYDROCHLORIDE 10 MG: 10 TABLET ORAL at 20:22

## 2023-04-26 RX ADMIN — LEVOTHYROXINE SODIUM 100 MCG: 100 TABLET ORAL at 06:59

## 2023-04-26 RX ADMIN — OXYCODONE 10 MG: 5 TABLET ORAL at 16:10

## 2023-04-26 ASSESSMENT — PAIN SCALES - GENERAL
PAINLEVEL_OUTOF10: 8
PAINLEVEL_OUTOF10: 6
PAINLEVEL_OUTOF10: 6
PAINLEVEL_OUTOF10: 10

## 2023-04-26 ASSESSMENT — PAIN DESCRIPTION - ORIENTATION
ORIENTATION: LEFT
ORIENTATION: LEFT

## 2023-04-26 ASSESSMENT — PAIN DESCRIPTION - PAIN TYPE: TYPE: ACUTE PAIN

## 2023-04-26 ASSESSMENT — PAIN DESCRIPTION - FREQUENCY
FREQUENCY: INTERMITTENT
FREQUENCY: INTERMITTENT

## 2023-04-26 ASSESSMENT — PAIN DESCRIPTION - DESCRIPTORS
DESCRIPTORS: THROBBING
DESCRIPTORS: ACHING

## 2023-04-26 ASSESSMENT — PAIN DESCRIPTION - ONSET
ONSET: ON-GOING
ONSET: ON-GOING

## 2023-04-26 ASSESSMENT — PAIN DESCRIPTION - LOCATION
LOCATION: KNEE
LOCATION: LEG

## 2023-04-26 NOTE — ANESTHESIA POSTPROCEDURE EVALUATION
Department of Anesthesiology  Postprocedure Note    Patient: Tamia Wright  MRN: [id number]  YOB: 1955  Date of evaluation: 4/25/2023      Procedure Summary     Date: 04/25/23 Room / Location: [facility] OR [address] [place] [address] - INPATIENT    Anesthesia Start: 2472 Anesthesia Stop: 1558    Procedure: LEFT KNEE   REMOVAL OF INFECTED PROSTHESIS AND PLACEMENT OF SPACER- Elspeth Loach (Left: Knee) Diagnosis:       Joint prosthesis infection or inflammation, sequela      (Joint prosthesis infection or inflammation, sequela [T84.50XS])    Surgeons: Carli Amos DO Responsible Provider: Nallely Larry MD    Anesthesia Type: general ASA Status: 2          Anesthesia Type: No value filed.     Nghia Phase I: Nghia Score: 9    Nghia Phase II:        Anesthesia Post Evaluation    Patient location during evaluation: PACU  Patient participation: complete - patient participated  Level of consciousness: awake  Airway patency: patent  Nausea & Vomiting: no nausea and no vomiting  Complications: no  Cardiovascular status: hemodynamically stable  Respiratory status: acceptable  Hydration status: stable  Multimodal analgesia pain management approach

## 2023-04-26 NOTE — PLAN OF CARE
Problem: Discharge Planning  Goal: Discharge to home or other facility with appropriate resources  4/26/2023 1141 by Redd Alves RN  Outcome: Progressing  Flowsheets (Taken 4/26/2023 0800)  Discharge to home or other facility with appropriate resources:   Identify barriers to discharge with patient and caregiver   Arrange for needed discharge resources and transportation as appropriate   Identify discharge learning needs (meds, wound care, etc)   Refer to discharge planning if patient needs post-hospital services based on physician order or complex needs related to functional status, cognitive ability or social support system  4/26/2023 0418 by Tristian Brar RN  Outcome: Progressing  Flowsheets (Taken 4/25/2023 1800 by Stephen Cao, RN)  Discharge to home or other facility with appropriate resources:   Identify barriers to discharge with patient and caregiver   Arrange for needed discharge resources and transportation as appropriate   Identify discharge learning needs (meds, wound care, etc)   Refer to discharge planning if patient needs post-hospital services based on physician order or complex needs related to functional status, cognitive ability or social support system     Problem: Pain  Goal: Verbalizes/displays adequate comfort level or baseline comfort level  4/26/2023 1141 by Redd Alves RN  Outcome: Progressing  4/26/2023 0418 by Tristian Brar RN  Outcome: Progressing  Flowsheets (Taken 4/25/2023 1814 by Stephen Cao RN)  Verbalizes/displays adequate comfort level or baseline comfort level:   Encourage patient to monitor pain and request assistance   Assess pain using appropriate pain scale   Administer analgesics based on type and severity of pain and evaluate response   Implement non-pharmacological measures as appropriate and evaluate response   Notify Licensed Independent Practitioner if interventions unsuccessful or patient reports new pain     Problem: Safety - Adult  Goal: Free

## 2023-04-26 NOTE — CARE COORDINATION
ID rounded and pt will need home IV ATB. Awaiting scripts. Emilie with OC informed of referral.  [facility] can accept.

## 2023-04-26 NOTE — OP NOTE
Operative Note      Patient: Thompson Eugene  YOB: 1955  MRN: [id number]    Date of Procedure: 4/25/2023    Pre-Op Diagnosis Codes:     * Joint prosthesis infection or inflammation, sequela [T84.50XS], left total knee arthroplasty infection    Post-Op Diagnosis: Same       Procedure(s):  LEFT KNEE   REMOVAL OF INFECTED PROSTHESIS AND PLACEMENT OF SPACER- ORTHO [place]any with excisional debridement of skin, subcutaneous tissue, muscle, fascia, bone    Surgeon(s):  Wayna Samples, DO    Assistant:   Surgical Assistant: Darwin Sewell  Resident: Timmy Hernandez DO    Anesthesia: General    Estimated Blood Loss (mL): 156    Complications: None  Operative note  Specimens:   ID Type Source Tests Collected by Time Destination   1 : LEFT KNEE #1 Tissue Joint, Knee CULTURE, ANAEROBIC AND AEROBIC Wayna Samples, DO 4/25/2023 1416    2 : LEFT KNEE #2 Tissue Joint, Knee CULTURE, ANAEROBIC AND AEROBIC Wayna Samples, DO 4/25/2023 1417    3 : LEFT KNEE #3 Tissue Joint, Knee CULTURE, ANAEROBIC AND AEROBIC Wayna Samples, DO 4/25/2023 1417    4 : LEFT KNEE #4 Tissue Joint, Knee CULTURE, ANAEROBIC AND AEROBIC Wayna Samples, DO 4/25/2023 1418        Implants:  Implant Name Type Inv.  Item Serial No.  Lot No. LRB No. Used Action   CEMENT BNE 40GM W/ GENT HI VISC RADPQ FOR REV SURG - [id number]  CEMENT BNE 40GM W/ GENT HI VISC RADPQ FOR REV SURG  ED [employer]ET ORTHOPED[employer]- [id number] Left 2 Implanted   EXTENSION STEM 100 MM KNEE REMEDY - [id number]  EXTENSION STEM 100 MM KNEE REMEDY  OSTE[employer]EMEDIES-WD [id number] Left 1 Implanted   COMPONENT FEM STEM LG KNEE REMEDY - [id number]  COMPONENT FEM STEM LG KNEE REMEDY  OSTEOREMEDIES-WD [id number] Left 1 Implanted   CEMENT BNE 40GM HI VISC RADPQ FOR REV SURG - [id number]  CEMENT BNE 40GM HI VISC RADPQ FOR REV SURG  ED BI[employer]ET ORTHOPED[employer]-WD [id number] Left 1 Implanted   EXTENSION STEM 100 MM KNEE REMEDY - [id number]  EXTENSION STEM

## 2023-04-26 NOTE — CONSULTS
[facility]  Initial Consult Note  Date: 4/26/2023    Hospital day :0     Impression:   Left prosthetic knee joint infection  Status post removal of the infected prosthesis with spacer placement, along with excisional debridement of the skin, soft tissue, muscle, fascia and bone 4/25/2023  Left total knee arthroplasty 62/9/6098 complicated by superficial wound infection/abscess  S/p debridement 1/2/2023  Osteoarthritis  Hypertension    Recommendations   The patient is currently on IV vancomycin, I will add ceftriaxone  Culture data is pending, thus far with gram-positive cocci in pairs  I have given her the option to remain in the hospital follow-up on culture data versus going home on current regimen of IV vancomycin and ceftriaxone following up in ID clinic Dr. Enrique Pena in 1 to 2 weeks. She has opted to be discharged and follow-up in the office to follow-up on cultures. The patient is to continue on 6 weeks of IV antimicrobial therapy  PICC line has been ordered  She will need to follow-up in the ID clinic with Dr. Enrique Pena in 1-2 weeks to follow up on culture data  We will continue to follow surgical cultures and adjust therapy accordingly    Chief complaint/reason for consultation:   Left prosthetic knee joint infection    History of Present Illness:   Elsa Ramirez is a 79y.o.-year-old female who was initially admitted on 4/25/2023. Patient has seen Dr. Enrique Pena in the office earlier this month for left prosthetic knee joint infection. She is normal history of osteoarthritis, hypertension, hypothyroidism, varicose veins. On December 7, 2022 she underwent a left total knee joint replacement. The report was that she started having drainage along with stitches and she was concerned about infection.   She was taken to the operating room January 2, 2023 due to delayed wound healing and underwent incision and drainage with irrigation and debridement of the skin and subcutaneous tissue

## 2023-04-26 NOTE — CARE COORDINATION
Per Jaja Stanley with OC pt cannot D/C home Thursday until comfirmed with Emilie from [facility]. Pt will likely have to stay until after 2pm dose Thursday.

## 2023-04-26 NOTE — CARE COORDINATION
Case Management Initial Discharge Plan  Lino Fraire,             Met with:patient to discuss discharge plans. Information verified: address, contacts, phone number, , insurance Yes  PCP: MARIA M Jones CNP  Date of last visit: 23    Insurance Provider: Moi Howard, Medicaid    Discharge Planning    Living Arrangements:  Family Members   Support Systems:  Children, Family Members    Home has 2 stories  5-6 stairs to climb to get into front door and has ramp at back door , 15 stairs to climb to reach second floor  Location of bedroom/bathroom in home  - staying on main follow in recliner    Patient able to perform ADL's:Independent    Current Services (outpatient & in home) none  DME equipment: walker, shower chair, commode  DME provider: N/A    Pharmacy: [id number] Needed:  Home Care vs OPPT     Patient agreeable to home care: Yes if needed  Freedom of choice provided:  yes    Prior SNF/Rehab Placement and Facility: No  Agreeable to SNF/Rehab: No  [place] of choice provided: n/a   Evaluation: n/a    Expected Discharge date:     Patient expects to be discharged to:   home  Follow Up Appointment: Best Day/ Time:      Transportation provider: daughter  Transportation arrangements needed for discharge: No    Readmission Risk              Risk of Unplanned Readmission:  0             Does patient have a readmission risk score greater than 14?: No  If yes, follow-up appointment must be made within 7 days of discharge. Goal of Care:       Discharge Plan: Met with patient at bedside. Lives with niece, independent and drives. POD #1 removal of infected prosthesis lt knee and placement of spacer per Dr. Uli Barakat. Has walker at home. Therapy currently in room and home PT vs OPPT  pending. Post op appt with Lindsey RODGERS 5-8 at 11:15am.  Follow S/P therapy evaluation.             Electronically signed by Jon Rivero RN on 23 at 9:04 AM EDT

## 2023-04-26 NOTE — CARE COORDINATION
Call from [place] with [facility] and OOP cost for both IV ATB and supplies is $8.30/week. Pt informed and agreeable to cost.    Arina Koehler will start IV Rocephin and 2nd dose of Vanco Thursday between 2-3pm.  Nurse informed.

## 2023-04-26 NOTE — PLAN OF CARE
Problem: Safety - Adult  Goal: Free from fall injury  Outcome: Progressing     Problem: Neurosensory - Adult  Goal: Achieves stable or improved neurological status  Outcome: Progressing     Problem: Musculoskeletal - Adult  Goal: Return mobility to safest level of function  Outcome: Progressing     Problem: Infection - Adult  Goal: Absence of infection at discharge  Outcome: Progressing

## 2023-04-27 ENCOUNTER — TELEPHONE (OUTPATIENT)
Dept: ORTHOPEDIC SURGERY | Age: 68
End: 2023-04-27

## 2023-04-27 VITALS
BODY MASS INDEX: 29.78 KG/M2 | HEART RATE: 59 BPM | TEMPERATURE: 97.7 F | SYSTOLIC BLOOD PRESSURE: 103 MMHG | RESPIRATION RATE: 18 BRPM | WEIGHT: 208 LBS | OXYGEN SATURATION: 93 % | HEIGHT: 70 IN | DIASTOLIC BLOOD PRESSURE: 56 MMHG

## 2023-04-27 LAB
BUN SERPL-MCNC: 15 MG/DL (ref 8–23)
CREAT SERPL-MCNC: 0.99 MG/DL (ref 0.5–0.9)
GFR SERPL CREATININE-BSD FRML MDRD: >60 ML/MIN/1.73M2

## 2023-04-27 PROCEDURE — 97535 SELF CARE MNGMENT TRAINING: CPT

## 2023-04-27 PROCEDURE — 82565 ASSAY OF CREATININE: CPT

## 2023-04-27 PROCEDURE — 2580000003 HC RX 258

## 2023-04-27 PROCEDURE — G0378 HOSPITAL OBSERVATION PER HR: HCPCS

## 2023-04-27 PROCEDURE — 36415 COLL VENOUS BLD VENIPUNCTURE: CPT

## 2023-04-27 PROCEDURE — 2580000003 HC RX 258: Performed by: INTERNAL MEDICINE

## 2023-04-27 PROCEDURE — 84520 ASSAY OF UREA NITROGEN: CPT

## 2023-04-27 PROCEDURE — 99232 SBSQ HOSP IP/OBS MODERATE 35: CPT | Performed by: NURSE PRACTITIONER

## 2023-04-27 PROCEDURE — 6360000002 HC RX W HCPCS: Performed by: INTERNAL MEDICINE

## 2023-04-27 PROCEDURE — 97116 GAIT TRAINING THERAPY: CPT

## 2023-04-27 PROCEDURE — 96365 THER/PROPH/DIAG IV INF INIT: CPT

## 2023-04-27 PROCEDURE — 6370000000 HC RX 637 (ALT 250 FOR IP)

## 2023-04-27 RX ORDER — CELECOXIB 200 MG/1
200 CAPSULE ORAL 2 TIMES DAILY
Qty: 60 CAPSULE | Refills: 1 | Status: SHIPPED | OUTPATIENT
Start: 2023-04-27

## 2023-04-27 RX ORDER — PSEUDOEPHEDRINE HCL 30 MG
200 TABLET ORAL DAILY
COMMUNITY
Start: 2023-04-28

## 2023-04-27 RX ORDER — POLYETHYLENE GLYCOL 3350 17 G/17G
17 POWDER, FOR SOLUTION ORAL DAILY PRN
Qty: 527 G | Refills: 1 | Status: SHIPPED | OUTPATIENT
Start: 2023-04-27 | End: 2023-05-27

## 2023-04-27 RX ADMIN — SODIUM CHLORIDE, PRESERVATIVE FREE 10 ML: 5 INJECTION INTRAVENOUS at 08:36

## 2023-04-27 RX ADMIN — Medication 1000 UNITS: at 08:36

## 2023-04-27 RX ADMIN — OXYCODONE 10 MG: 5 TABLET ORAL at 06:06

## 2023-04-27 RX ADMIN — BUSPIRONE HYDROCHLORIDE 10 MG: 10 TABLET ORAL at 08:36

## 2023-04-27 RX ADMIN — APIXABAN 2.5 MG: 2.5 TABLET, FILM COATED ORAL at 08:36

## 2023-04-27 RX ADMIN — DOCUSATE SODIUM 200 MG: 100 CAPSULE, LIQUID FILLED ORAL at 08:36

## 2023-04-27 RX ADMIN — ATENOLOL 25 MG: 25 TABLET ORAL at 08:36

## 2023-04-27 RX ADMIN — CELECOXIB 200 MG: 200 CAPSULE ORAL at 08:36

## 2023-04-27 RX ADMIN — ACETAMINOPHEN 1000 MG: 500 TABLET ORAL at 08:36

## 2023-04-27 RX ADMIN — VANCOMYCIN HYDROCHLORIDE 1000 MG: 1 INJECTION, POWDER, LYOPHILIZED, FOR SOLUTION INTRAVENOUS at 01:58

## 2023-04-27 RX ADMIN — ACETAMINOPHEN 1000 MG: 500 TABLET ORAL at 01:54

## 2023-04-27 RX ADMIN — SERTRALINE 25 MG: 25 TABLET, FILM COATED ORAL at 08:35

## 2023-04-27 RX ADMIN — LEVOTHYROXINE SODIUM 100 MCG: 100 TABLET ORAL at 06:06

## 2023-04-27 RX ADMIN — GABAPENTIN 300 MG: 300 CAPSULE ORAL at 08:36

## 2023-04-27 ASSESSMENT — PAIN SCALES - GENERAL
PAINLEVEL_OUTOF10: 5
PAINLEVEL_OUTOF10: 4
PAINLEVEL_OUTOF10: 5
PAINLEVEL_OUTOF10: 3

## 2023-04-27 ASSESSMENT — PAIN DESCRIPTION - FREQUENCY: FREQUENCY: INTERMITTENT

## 2023-04-27 ASSESSMENT — PAIN DESCRIPTION - ORIENTATION: ORIENTATION: LEFT

## 2023-04-27 ASSESSMENT — PAIN DESCRIPTION - ONSET: ONSET: ON-GOING

## 2023-04-27 ASSESSMENT — PAIN DESCRIPTION - LOCATION: LOCATION: LEG

## 2023-04-27 ASSESSMENT — ENCOUNTER SYMPTOMS
RESPIRATORY NEGATIVE: 1
GASTROINTESTINAL NEGATIVE: 1

## 2023-04-27 ASSESSMENT — PAIN DESCRIPTION - DESCRIPTORS: DESCRIPTORS: ACHING

## 2023-04-27 NOTE — PLAN OF CARE
Problem: Discharge Planning  Goal: Discharge to home or other facility with appropriate resources  4/27/2023 1010 by Bao Bull RN  Outcome: Progressing  Flowsheets (Taken 4/27/2023 1007)  Discharge to home or other facility with appropriate resources:   Identify barriers to discharge with patient and caregiver   Arrange for needed discharge resources and transportation as appropriate   Identify discharge learning needs (meds, wound care, etc)   Refer to discharge planning if patient needs post-hospital services based on physician order or complex needs related to functional status, cognitive ability or social support system  4/27/2023 0453 by Bebo Khan RN  Outcome: Progressing     Problem: Pain  Goal: Verbalizes/displays adequate comfort level or baseline comfort level  4/27/2023 1010 by Bao Bull RN  Outcome: Progressing  4/27/2023 0453 by Bebo Khan RN  Outcome: Progressing     Problem: Safety - Adult  Goal: Free from fall injury  4/27/2023 1010 by Bao Bull RN  Outcome: Progressing  4/27/2023 0453 by Bebo Khan RN  Outcome: Progressing     Problem: Neurosensory - Adult  Goal: Achieves stable or improved neurological status  4/27/2023 1010 by Bao Bull RN  Outcome: Progressing  Flowsheets (Taken 4/27/2023 1007)  Achieves stable or improved neurological status: Assess for and report changes in neurological status  4/27/2023 0453 by Bebo Khan RN  Outcome: Progressing  Goal: Achieves maximal functionality and self care  4/27/2023 1010 by Bao Bull RN  Outcome: Progressing  Flowsheets (Taken 4/27/2023 1007)  Achieves maximal functionality and self care: Monitor swallowing and airway patency with patient fatigue and changes in neurological status  4/27/2023 0453 by Bebo Khan RN  Outcome: Progressing     Problem: Skin/Tissue Integrity - Adult  Goal: Incisions, wounds, or drain sites healing without S/S of infection  4/27/2023 1010 by Javier

## 2023-04-27 NOTE — PLAN OF CARE
Problem: Safety - Adult  Goal: Free from fall injury  Outcome: Progressing     Problem: Neurosensory - Adult  Goal: Achieves stable or improved neurological status  Outcome: Progressing     Problem: Skin/Tissue Integrity - Adult  Goal: Incisions, wounds, or drain sites healing without S/S of infection  Outcome: Progressing  Flowsheets (Taken 4/26/2023 2020)  Incisions, Wounds, or Drain Sites Healing Without Sign and Symptoms of Infection: TWICE DAILY: Assess and document skin integrity     Problem: Skin/Tissue Integrity - Adult  Goal: Skin integrity remains intact  Outcome: Progressing  Flowsheets (Taken 4/26/2023 2020)  Skin Integrity Remains Intact: Monitor for areas of redness and/or skin breakdown

## 2023-04-27 NOTE — PROGRESS NOTES
[address] [facility] Pharmacokinetic Monitoring Service - Vancomycin    Consulting Provider: Dr. Anna Hernandez   Indication: infected knee prothesis, total knee in December with subsequent infection of the joint   Target Concentration: Goal AUC/FRANCISCO JAVIER 400-600 mg*hr/L  Day of Therapy: 2  Additional Antimicrobials: Rocephin 2 Gm IV q 24 hours    Pertinent Laboratory Values: Wt Readings from Last 1 Encounters:   04/25/23 208 lb (94.3 kg)     Temp Readings from Last 1 Encounters:   04/26/23 97.3 °F (36.3 °C) (Oral)     Estimated Creatinine Clearance: 79 mL/min (based on SCr of 0.87 mg/dL). Recent Labs     04/26/23  0601   CREATININE 0.87         Pertinent Cultures:  Culture Date Source Results   4/25 Knee joint fluid Rare gram positive cocci in pairs    MRSA Nasal Swab: N/A. Non-respiratory infection.     Recent vancomycin administrations                     vancomycin (VANCOCIN) 750 mg in sodium chloride 0.9 % 250 mL IVPB (mg) 750 mg New Bag 04/26/23 1407    vancomycin (VANCOCIN) 2,000 mg in sodium chloride 0.9 % 500 mL IVPB (mg) 2,000 mg New Bag 04/26/23 0159                    Assessment:  Date/Time Current Dose Concentration Timing of Concentration (h) AUC   4/26 750mg IV q 12 hrs 18.1 ug/ml 20:09 400   Note: Serum concentrations collected for AUC dosing may appear elevated if collected in close proximity to the dose administered, this is not necessarily an indication of toxicity    Plan:  Current dosing regimen is sub-therapeutic  Increase dose to Vancomycin 1000mg IV q 12 hours   Repeat vancomycin concentration ordered for 4/28 @ 1800   Pharmacy will continue to monitor patient and adjust therapy as indicated    Thank you for the consult,  TEJAS Butcher BRIAN [facility]  4/26/2023 8:50 PM
[address] [facility] Pharmacokinetic Monitoring Service - Vancomycin    Consulting Provider: Dr. Ayla Pickard   Indication: infected knee prothesis, total knee in December with subsequent infection of the joint   Target Concentration: Goal AUC/FRANCISCO JAVIER 400-600 mg*hr/L  Day of Therapy: 3  Additional Antimicrobials: Rocephin 2 Gm IV q 24 hours    Pertinent Laboratory Values: Wt Readings from Last 1 Encounters:   04/25/23 208 lb (94.3 kg)     Temp Readings from Last 1 Encounters:   04/26/23 97.3 °F (36.3 °C) (Oral)     Estimated Creatinine Clearance: 79 mL/min (based on SCr of 0.87 mg/dL). Recent Labs     04/26/23  0601 04/27/23  0618   CREATININE 0.87 0.99*         Pertinent Cultures:  Culture Date Source Results   4/25 Knee joint fluid No growth   Gram pos cocci in pairs    MRSA Nasal Swab: N/A. Non-respiratory infection.     Recent vancomycin administrations                     vancomycin (VANCOCIN) 750 mg in sodium chloride 0.9 % 250 mL IVPB (mg) 750 mg New Bag 04/26/23 1407    vancomycin (VANCOCIN) 2,000 mg in sodium chloride 0.9 % 500 mL IVPB (mg) 2,000 mg New Bag 04/26/23 0159                    Assessment:  Date/Time Current Dose Concentration Timing of Concentration (h) AUC   4/26 750mg IV q 12 hrs 18.1 ug/ml 20:09 400   Note: Serum concentrations collected for AUC dosing may appear elevated if collected in close proximity to the dose administered, this is not necessarily an indication of toxicity    Plan:  Current dosing regimen is sub-therapeutic  Increase dose to Vancomycin 1000mg IV q 12 hours   Repeat vancomycin concentration ordered for 4/28 @ 1800   Pharmacy will continue to monitor patient and adjust therapy as indicated    Thank you for the consult,  TEJAS Piper [facility]  4/27/2023 9:49 AM
[facility]  Office: [address], DO, Fred Ards, DO, Ruchi Greenlee, DO, Chandrakant Foley Blood, DO, William Hurtado MD, Leah Garg MD, Mally Fonseca MD, Linda An MD,  Inessa Tong MD, Leonarda Santos MD, Krishna Colin, DO, Minal Cyr MD,  Jeannie Alejandra MD, Naomi Williamson MD, Tori Moritz, DO, Rachael Powers MD, Faustina Brown MD, Cecil Buerger, DO, Travis Ramirez MD, Deandre Durham MD, Cal Storey MD, Jasson Lagunas MD,  Ricky Christine DO, Pedro Ulrich MD,  Rumalda Snellen, CNP,  Keshav Silva, CNP, Jhony Zapien, CNP, Rolando Pettit, CNP,  Kitty Mackenzie, [facility], Margaret Rocha, CNP, Stephanie Pretty, CNP, Beverley Streeter, CNP, Leidy Oliveira, CNP, Yanna Rice, CNP, John Bills PA-C, Bruno Caldera, CNS, Lizbeth Borja, CNP, Chaya Gomez, [facility]    Progress Note    4/26/2023    8:29 AM    Name:   Ivana Kaminski  MRN:     [id number]     Acct:      [de-identified]   Room:   2006/2006-02  IP Day:  0  Admit Date:  4/25/2023  9:56 AM    PCP:   MARIA M Alvarez CNP  Code Status:  Full Code    Subjective:     C/C: Knee pain    Interval History Status: improved. Postop pain is well controlled. Patient has been getting up with therapy. Anxious for discharge. Denies any chest pain, shortness of breath, nausea vomiting, fever chills or acute complaints. Brief History: This is a 80-year-old female that presents with swelling of her left knee with findings concerning for infected arthroplasty. She underwent knee replacement surgery with Dr. Mary Jo William on December 7, 2022 which was initially uneventful. At her postop visit she had severe discoloration of her incision and was reassured that the findings were consistent with postoperative inflammation. By the end of December she had increased pain and swelling and dehiscence of her wound.   She underwent I&D and washout but
Comprehensive Nutrition Assessment    Type and Reason for Visit:  Initial    Nutrition Recommendations/Plan:   Provide Regular diet as ordered   Monitor labs as they become available   Monitor skin integrity/weights     Malnutrition Assessment:  Malnutrition Status:  No malnutrition (04/26/23 1325)    Context:  Acute Illness     Findings of the 6 clinical characteristics of malnutrition:  Energy Intake:  No significant decrease in energy intake  Weight Loss:  No significant weight loss     Body Fat Loss:  No significant body fat loss     Muscle Mass Loss:  No significant muscle mass loss    Fluid Accumulation:  No significant fluid accumulation     Strength:  Not Performed    Nutrition Assessment:    seen at bedside patient was sitting in chair, states she ate the turkey not the bun along with the chips and pudding, refuses ensure does note care for, consult was for weight loss patient states she eats what she wants. states she plans to discharge tomorrow. Follow up as LR as patient plans to leave tomorrow. Nutrition Related Findings:    has missing teeth, LLE +2 non-pitting edema noted, left knee incision noted, Wound Type: None       Current Nutrition Intake & Therapies:    Average Meal Intake: 51-75%  Average Supplements Intake: Refusing to take  ADULT DIET; Regular  ADULT ORAL NUTRITION SUPPLEMENT; Breakfast, Lunch, Dinner; Standard High Calorie/High Protein Oral Supplement    Anthropometric Measures:  Height: 5' 10\" (177.8 cm)  Ideal Body Weight (IBW): 150 lbs (68 kg)    Admission Body Weight: 208 lb (94.3 kg)  Current Body Weight: 150 lb (68 kg), 100 % IBW. Weight Source: Bed Scale  Current BMI (kg/m2): 21.5        Weight Adjustment For: No Adjustment                 BMI Categories: Overweight (BMI 25.0-29. 9)    Estimated Daily Nutrient Needs:  Energy Requirements Based On: Kcal/kg  Weight Used for Energy Requirements: Adjusted  Energy (kcal/day): 7873-4446 kcals per day using 25-30 g/kg of adjusted
Elsa Ramirez was evaluated today and a DME order was entered for a wheeled walker because she requires this to successfully complete daily living tasks of ambulating. A wheeled walker is necessary due to the patient's unsteady gait, upper body weakness, and inability to  an ambulation device; and she can ambulate only by pushing a walker instead of a lesser assistive device such as a cane, crutch, or standard walker. The need for this equipment was discussed with the patient and she understands and is in agreement.
[facility]  Progress Note    Elie Person  MRN: [id number]  Date: 4/27/2023  LOS: 0     Reason for F/U :   Left prosthetic knee joint infection    Impression :   Left prosthetic knee joint infection  Status post removal of the infected prosthesis with spacer placement, along with excisional debridement of skin, soft tissue, muscle, fascia, bone 4/25/2023  Left total knee arthroplasty 02/9/8376 complicated by superficial wound infection/abscess  Status post debridement 1/2/2023  Osteoarthritis  Hypertension    Recommendations: The patient continue IV vancomycin and ceftriaxone  Plans to continue IV antimicrobial therapy x6 weeks  The culture data remains pending, thus far with gram-positive cocci in pairs  I have given the patient the option to remain hospitalized until culture data is finalized versus being discharged on IV vancomycin and ceftriaxone and following up in [facility] in 1 week for follow-up on the final culture, she has opted to be discharged and follow-up in about 1 week with Dr. Trenton Mckeon  Right upper extremity PICC line has been placed  Weekly labs to Dr. Trenton Mckeon including: CBC, BMP, Vanco trough every Monday  Weekly labs to Dr. Trenton Mckeon including: Creatinine and Vanco trough every Thursday  She is okay for discharge from an infectious disease standpoint    Infection Control Recommendations:   Universal precautions    Discharge Planning:   Estimated Length of IV antimicrobials: 6 weeks  Patient will need Midline Catheter Insertion/ PICC line Insertion: No  Patient will need: Home IV , [place],  SNF,  LTAC: Undetermined  Patient willneed outpatient wound care: No    Medical Decision making / Summary of Stay:   Elie Person is a 79y.o.-year-old female who was initially admitted on 4/25/2023. Patient has seen Dr. Trenton Mckeon in the office earlier this month for left prosthetic knee joint infection.   She is normal history of osteoarthritis, hypertension,
Occupational Therapy  Facility/Department: [facility]  Rehabilitation Occupational Therapy Daily Treatment Note    Date: 23  Patient Name: Danish Yousif       Room:   MRN: [id number]  Account: [de-identified]   : 1955  (70 y.o.) Gender: female     Past Medical History:  has a past medical history of Chronic back pain, Hypertension, Hypothyroidism, and Osteoarthritis. Past Surgical History:   has a past surgical history that includes Spine surgery (2005); Hysterectomy (2005); Cholecystectomy; Hip fracture surgery (Right, 2016); eye surgery (Bilateral); joint replacement (Bilateral); Revision total knee arthroplasty (Left, 2023); and IR INSERT PICC VAD W SQ PORT >5 YEARS (2023). Restrictions  Restrictions/Precautions: General Precautions, Fall Risk, Weight Bearing  Other position/activity restrictions: Up w/ assist, LUE IV, L knee wound vac  Left Lower Extremity Weight Bearing: Partial Weight Bearing  Required Braces or Orthoses?: No    Subjective  Subjective: \"I don't want to take a shower I just want to get ready to go home\". Restrictions/Precautions: General Precautions; Fall Risk;Weight Bearing   Objective     Cognition  Overall Cognitive Status: WFL  Safety Judgement: Decreased awareness of need for safety;Decreased awareness of need for assistance  Orientation  Overall Orientation Status: Within Functional Limits     ADL  Grooming/Oral Hygiene  Assistance Level: Independent; Set-up  Skilled Clinical Factors: Independent with set up while seated in bedside chair. Upper Extremity Dressing  Assistance Level: Independent; Set-up  Skilled Clinical Factors: Pt. able to jonny shirt with set up while sitting upright in bedside chair. Lower Extremity Dressing  Assistance Level: Minimal assistance  Skilled Clinical Factors: Min assist with donning clothing over feet to pull to waist. Pt. did require assist with lacing wound vac through clothing.   Putting On/Taking Off
Orthopedic Coordinator Note    Patient s/p left total Knee replacement REMOVAL WITH ATB SPACER PLACEMENT 04/25/2023 WITH DR. Radames Hyatt. The following appointments are currently scheduled:    Post-op with surgeon 05/08/2023 A 1115 IN [place] WITH ANA MARIA LITTLE. Physical Therapy NONE      Wheeled walker order is not entered  Face to face documentation is N/A-PT HAS A FWW FROM [facility] 01/02/2023 WITH DR. Mykel Lindquist. DVT Prophylaxis: 81MG EC ASA BID X 6 WEEKS.       Any questions please contact Jenna Jarvis RN, MSN  [phone]      Electronically signed by: Jenna Jarvis RN on 4/25/2023 at 9:15 AM
PAOLAW ORDERED FROM JOCELYN AND WRITER WILL DELIVER TO PT'S ROOM TODAY. SPOKE WITH DEJA AT [facility] AND AVAILABILITY FOR SN AND PT/OT AT HOME.
Physical Therapy  Facility/Department: [facility] MED SURG  Physical Therapy Initial Assessment - POD #1    Name: Hermann Hightower  : 1955  MRN: [id number]  Date of Service: 2023  FRANKO Bryan reports patient is medically stable for therapy treatment this date. Chart reviewed prior to treatment and patient is agreeable for therapy. All lines intact and patient positioned comfortably at end of treatment. All patient needs addressed prior to ending therapy session. Discharge Recommendations:  Patient would benefit from continued therapy after discharge   Pt presenting with new musculoskeletal dysfunction and would benefit from additional therapy at time of discharge. Please refer to the AM-PAC score for current functional status. PT Equipment Recommendations  Equipment Needed: Yes  Mobility Devices: Mandie Schooling: Rolling    H&P / Procedure: 2023 - Dr. Aliyah Marino   Left knee removal of deep implant of infected prosthesis with antibiotic spacer placement  Left knee sharp irrigation and debridement using, knife, electrocautery, rongeur, and curettes of skin, subcutaneous tissue, fascia, muscle, and bone. Left knee application of wound vac    Patient Diagnosis(es): The primary encounter diagnosis was Post-op pain. Diagnoses of Joint prosthesis infection or inflammation, sequela and S/P revision of total knee, left were also pertinent to this visit. Past Medical History:  has a past medical history of Chronic back pain, Hypertension, Hypothyroidism, and Osteoarthritis. Past Surgical History:  has a past surgical history that includes Spine surgery (2005); Hysterectomy (2005); Cholecystectomy; Hip fracture surgery (Right, 2016); eye surgery (Bilateral); joint replacement (Bilateral); and Revision total knee arthroplasty (Left, 2023). Assessment   Body Structures, Functions, Activity Limitations Requiring Skilled Therapeutic Intervention: Decreased functional mobility ; Decreased
Physical Therapy  Facility/Department: [facility] SURG  Rehabilitation Physical Therapy Treatment Note    NAME: Onesimo Hammans  : 1955 (79 y.o.)  MRN: [id number]  CODE STATUS: Full Code    Date of Service: 23       Restrictions:  Restrictions/Precautions: General Precautions, Fall Risk, Weight Bearing  Lower Extremity Weight Bearing Restrictions  Left Lower Extremity Weight Bearing: Partial Weight Bearing  Partial Weight Bearing Percentage Or Pounds: 30lbs flat foot w/ RW  Position Activity Restriction  Other position/activity restrictions: Up w/ assist, LUE IV, L knee wound vac     SUBJECTIVE  Subjective  Subjective: pt up in chair agreeable to PT                 OBJECTIVE  Cognition  Overall Cognitive Status: [facility]  Safety Judgement: Decreased awareness of need for safety;Decreased awareness of need for assistance  Orientation  Overall Orientation Status: Within Functional Limits    Functional Mobility  Transfers  Surface: From chair with arms; To chair with arms  Additional Factors: Verbal cues; Hand placement cues  Device: Walker  Sit to Stand  Assistance Level: Stand by assist;Contact guard assist  Stand to Sit  Assistance Level: Stand by assist;Contact guard assist  Bed To/From Chair  Technique: Stand pivot; Sit pivot  Assistance Level: Contact guard assist;Stand by assist  Stand Pivot  Assistance Level: Stand by assist;Contact guard assist      Environmental Mobility  Ambulation  Surface: Level surface  Device: Rolling walker  Distance: 65 ft  Activity: Within Room  Additional Factors: Verbal cues; Hand placement cues  Assistance Level: Contact guard assist  Gait Deviations: Decreased step length bilateral           Pt declined practicing stairs, she has a ramp at home she plans on using        PT Exercises  Exercise Treatment: reviewed TKA ex per protocol      ASSESSMENT/PROGRESS TOWARDS GOALS       Assessment  Activity Tolerance: Patient tolerated treatment well;Patient limited by pain; Patient limited by
Pt admitted to room. Oriented to room, call light and bed mechanics. Side rails up x2. Call light within reach. Orders reviewed.
Pt discharged to home per wc with family. No distress noted. HHC will start tonight with iv atb.
[facility]  Office: [address], DO, Santa Ricks, DO, Cruzito Palacios, DO, Moody Liu, DO, Cecilia Laws MD, Danielle Dallas MD, Juan F Dejesus MD, Jocelyn Deng MD,  Sobia Pinedo MD, Ashley Gastelum MD, Louise Trevino, DO, Angella Holguin MD,  Jeni Rojas MD, Sandy Castro MD, Yolanda Bah, DO, Shabnam Barnhart MD, Jamie Medina MD, Radha Ingram, DO, Marbella March MD, Ellyn Holden MD, Joey Magaña MD, Shasta Shelley MD,  Teresa Massey, DO, Sylvie Curiel MD,  Ruth Ramirez, CNP,  Angelo Natarajan, CNP, Makayla Graham, CNP, Leidy Douglsa, CNP,  Khoa Langford, [facility], Natividad Mortensen, CNP, Juan Gibson, CNP, Da Mas, CNP, Santiago Hussein, CNP, Brian Travis, CNP, Casper Rdz PA-C, Andrei Gomez, CNS, Angie Jones, CNP, Randi Ibarra, CNP         [facility]    Progress Note    2023    11:20 AM    Name:   Shannan Villegas  MRN:     [id number]     Acct:      [de-identified]   Room:     IP Day:  0  Admit Date:  2023  9:56 AM    PCP:   MARIA M Khanna CNP  Code Status:  Full Code    Subjective:     C/C: Knee pain    Interval History Status: improved. Condition continues to improve. Patient has been working with therapy and is comfortable in going home. Infectious disease on board and recommends PICC line placement with long-term antibiotics. Cultures can be followed as an outpatient. Patient will follow with ID and orthopedics in the outpatient setting. Patient expressed that she has been stable on her chronic medications for years and this is followed closely by her primary care provider. Patient is stable for discharge from internal medicine standpoint. Brief History: This is a 61-year-old female that presents with swelling of her left knee with findings concerning for infected arthroplasty.   She underwent knee replacement surgery with Dr. Verito Sewell
Transitions of Care Pharmacy Service   Medication Review    The patient's list of current home medications has been reviewed. Source(s) of information: spoke to patient, [facility] pharmacy, [facility] pcp medication list and sure scripts     Based on information provided by the above source(s), I have updated the patient's home med list as described below. I changed or updated the following medications on the patient's home medication list:  Discontinued oxyCODONE-acetaminophen (PERCOCET) 5-325 MG per tablet  apixaban (ELIQUIS) 2.5 MG TABS tablet  Potassium 99 MG TABS  morphine (MS CONTIN) 15 MG extended release tablet  docusate sodium (COLACE) 100 MG capsule     Added None      Adjusted   None      Other Notes Patient did not complete 30 day dose of  doxycycline hyclate (VIBRA-TABS) 100 MG tablet she started on  4/4/23. Please feel free to call me with any questions about this encounter. Thank you. This note will be reviewed and co-signed by the Transitions of Care Pharmacist. The pharmacist will review inpatient orders and contact the physician about any discrepancies. Navarro Parry, pharmacy technician  Transitions of Care Pharmacy Service  Phone:  [phone]  Fax: [phone]      Electronically signed by Navarro Parry on 4/26/2023 at 1:36 PM       Prior to Admission medications    Medication Sig   doxycycline hyclate (VIBRA-TABS) 100 MG tablet Take 1 tablet by mouth 2 times daily #30 days starting 4/4/23   busPIRone (BUSPAR) 10 MG tablet TAKE 1 TO 2 TABLETS BY MOUTH THREE TIMES DAILY AS NEEDED   vitamin D 25 MCG (1000 UT) CAPS Take 1 capsule by mouth 2 times daily (with meals)   levothyroxine (SYNTHROID) 100 MCG tablet Take 1 tablet by mouth once daily   sertraline (ZOLOFT) 25 MG tablet Take 1 tablet by mouth once daily   gabapentin (NEURONTIN) 300 MG capsule Take 1 capsule by mouth 2 times daily.    meloxicam (MOBIC) 7.5 MG tablet Take 1 tablet by mouth daily as needed for Pain
[address] NOTE    Room # 2006/2006-02   Name: Kylie Molina            Zoroastrian: None     Reason for visit: Routine    I visited the patient. Admit Date & Time: 4/25/2023  9:56 AM    Assessment:  Kylie Molina is a 79 y.o. female in the hospital because she has an infection in her prosthetic knee joint . Upon entering the room the patient is found resting in bed watching TV. Intervention:  I introduced myself and my title as  I offered space for patient  to express feelings, needs, and concerns and provided a ministry presence. Patient is awake and alert but concerned about knee infection. Patient is lethargic so  prays at bedside and then allows patient to rest.    Outcome:  Patient calm and coping. Plan:  Chaplains will remain available to offer spiritual and emotional support as needed. Electronically signed by Grabiel Richter on 4/26/2023 at 5:30 PM.  Denita     04/26/23 1727   Encounter Summary   Service Provided For: Patient   Referral/Consult From: [address] Family members   Last Encounter  04/26/23   Complexity of Encounter Low   Begin Time 1520   End Time  1540   Total Time Calculated 20 min   Encounter    Type Initial Screen/Assessment   Spiritual/Emotional needs   Type Spiritual Support   Assessment/Intervention/Outcome   Assessment Calm;Coping; Impaired resilience; Impaired social interaction; Interrupted family processes   Intervention Active listening;Explored/Affirmed feelings, thoughts, concerns;Nurtured Hope;Sustaining Presence/Ministry of presence   Outcome Comfort;Coping;Receptive
sit: Minimal assistance  Transfer Comments: Pt completed STS transfer from bed height without significant diffiuclites. Pt given min verbal cues for pacing self, upright posture, RW safety, controlled stand to sit, squaring self/AD up to surface and reaching back prior to sitting, line mgmt all to increase safety. Vision  Vision: Impaired (denies acute visual changes)  Vision Exceptions: Wears glasses at all times  Hearing  Hearing: Within functional limits  Cognition  Overall Cognitive Status: [facility]  Safety Judgement: Decreased awareness of need for safety;Decreased awareness of need for assistance  Orientation  Overall Orientation Status: Within Functional Limits                  Education Given To: Patient  Education Provided: Role of Therapy;Plan of Care;ADL Adaptive Strategies;Transfer Training;Energy Conservation; Fall Prevention Strategies  Education Provided Comments: safety in function, fall prevention/call light use, OT POC, role of OT in acute care setting, pursed lip breathing tech, recommendations for discharge/AE, PWB surgical precaution, use of medicated soap for infection prevention, safe ways to enter home, importance of having 24/7 assistance at home, proper transfer tech, proper bed mobility tech, RW use/safety  Education Method: Verbal  Barriers to Learning: None  Education Outcome: Verbalized understanding;Demonstrated understanding                                         AM-PAC Score        AM-[facility] Inpatient Daily Activity Raw Score: 17 (04/26/23 1342)  AM-PAC Inpatient ADL T-Scale Score : 37.26 (04/26/23 1342)  ADL Inpatient CMS 0-100% Score: 50.11 (04/26/23 1342)  ADL Inpatient CMS G-Code Modifier : CK (04/26/23 1342)         Goals  Short Term Goals  Time Frame for Short Term Goals: By discharge, pt to demo  Short Term Goal 1: ADL transfers and functional mobility to Mod I with use of AD while adhereing to [facility] precaution.   Short Term Goal 2: UB ADLs to Set up and LB ADLs to Min A with use of

## 2023-04-27 NOTE — PLAN OF CARE
Problem: Discharge Planning  Goal: Discharge to home or other facility with appropriate resources  4/27/2023 1145 by Akira Delatorre RN  Outcome: Completed  4/27/2023 1010 by Kelli Simon RN  Outcome: Progressing  Flowsheets (Taken 4/27/2023 1007)  Discharge to home or other facility with appropriate resources:   Identify barriers to discharge with patient and caregiver   Arrange for needed discharge resources and transportation as appropriate   Identify discharge learning needs (meds, wound care, etc)   Refer to discharge planning if patient needs post-hospital services based on physician order or complex needs related to functional status, cognitive ability or social support system  4/27/2023 0453 by Fredis Macias RN  Outcome: Progressing     Problem: Pain  Goal: Verbalizes/displays adequate comfort level or baseline comfort level  4/27/2023 1145 by Akira Delatorre RN  Outcome: Completed  4/27/2023 1010 by Kelli Simon RN  Outcome: Progressing  4/27/2023 0453 by Fredis Macias RN  Outcome: Progressing

## 2023-04-27 NOTE — CARE COORDINATION
DC Planning    Spoke with pt at bedside. Plan for home today with [facility] pm per David Anton from [place] did speak with Norberto. OC has all documentation needed. Pt does have PICC. Pt did receive walker. MARY is signed. Home care agency will pull data electronically.

## 2023-04-27 NOTE — TELEPHONE ENCOUNTER
Date of Procedure: 4/25/2023     Pre-Op Diagnosis Codes:     * Joint prosthesis infection or inflammation,     Procedure(s):  LEFT KNEE   REMOVAL OF INFECTED PROSTHESIS AND PLACEMENT OF SPACER-    Patient on MS Contin 15mg ER BID. Was ordered percocet as d/c. Do you want to continue the percocet?

## 2023-04-28 ENCOUNTER — TELEPHONE (OUTPATIENT)
Dept: ORTHOPEDIC SURGERY | Age: 68
End: 2023-04-28

## 2023-04-28 NOTE — TELEPHONE ENCOUNTER
DOS: 4/25/23  L Knee replacement removal and placement of spacer     Patients friend Jc Faraz called and stated that the patients wound vac is beeping, and states that the \"hose is kinked. \" She said a nurse came out last night to show her how to administer meds, but showed her nothing else. She said that the patient does not have a home nurse that they know of that is going to be coming out daily. She called them before calling us, and they told her to call our office for this. I gave her the number to Candi Loaiza (our wound vac tech) to see if he would be able to help her better. I told her that if she did not hear from him by 3:30 pm today, to call our office back and we would help try to figure something out. Expressing someone should be here until 4:30 pm to day and that would give us time. Jc Faraz stated understanding and had no further questions at this time.

## 2023-04-28 NOTE — TELEPHONE ENCOUNTER
Spoke with patient she already picked medication up. She is not taking MS contin at this time. It is her back pain medication, but it is on hold until off the Percocet.

## 2023-04-29 NOTE — DISCHARGE SUMMARY
Orthopedic Surgery Discharge Summary    Patient Identification:   -Thompson Eugene is a 79 y.o. female. -:  1955  -MRN: [id number]     -Acct: [de-identified]   -Admit Date:  2023  -Discharge date and time: 2023 11:00 AM     Admitting Physician: Darby Mendieta DO     Discharge Physician: Same    Admission Diagnoses: Infected left total knee arthroplasty    Discharge Diagnoses: Infected left total knee arthroplasty status post removal of infected prosthesis and placement antibiotic spacer    Indication for Admission: Allyson-operative observation and pain control    Surgical Procedure: Incision, irrigation, debridement infected left total knee arthroplasty with removal of prosthesis and placement of an antibiotic spacer    Admission Condition: good    Discharge Condition: good    Consults: ID    Significant Diagnostic Studies: Standard allyson-operative labs/imaging. Discharge medications:      Medication List        START taking these medications      apixaban 2.5 MG Tabs tablet  Commonly known as: Eliquis  Take 1 tablet by mouth 2 times daily     cefTRIAXone  infusion  Commonly known as: ROCEPHIN  Infuse 2,000 mg intravenously every 24 hours Compound per protocol     celecoxib 200 MG capsule  Commonly known as: CELEBREX  Take 1 capsule by mouth 2 times daily     oxyCODONE-acetaminophen 5-325 MG per tablet  Commonly known as: Percocet  Take 1-2 tablets by mouth every 6 hours as needed for Pain for up to 6 days. Max Daily Amount: 8 tablets     polyethylene glycol 17 g packet  Commonly known as: GLYCOLAX  Take 17 g by mouth daily as needed for Constipation     vancomycin  infusion  Commonly known as: VANCOCIN  Infuse 750 mg intravenously in the morning and 750 mg in the evening. Compound per protocol. .            Jina Leung taking these medications      atenolol 25 MG tablet  Commonly known as: TENORMIN     busPIRone 10 MG tablet  Commonly known as: BUSPAR  TAKE 1 TO 2 TABLETS BY MOUTH THREE TIMES

## 2023-04-30 LAB
MICROORGANISM SPEC CULT: ABNORMAL
MICROORGANISM SPEC CULT: NORMAL
MICROORGANISM/AGENT SPEC: ABNORMAL
MICROORGANISM/AGENT SPEC: NORMAL
MICROORGANISM/AGENT SPEC: NORMAL
SPECIMEN DESCRIPTION: ABNORMAL
SPECIMEN DESCRIPTION: NORMAL

## 2023-05-01 ENCOUNTER — HOSPITAL ENCOUNTER (OUTPATIENT)
Age: 68
Setting detail: SPECIMEN
Discharge: HOME | End: 2023-05-01
Payer: MEDICARE

## 2023-05-01 LAB
MICROORGANISM SPEC CULT: ABNORMAL
MICROORGANISM SPEC CULT: ABNORMAL
MICROORGANISM/AGENT SPEC: ABNORMAL
SPECIMEN DESCRIPTION: ABNORMAL
SPECIMEN DESCRIPTION: ABNORMAL

## 2023-05-01 PROCEDURE — 85025 COMPLETE CBC W/AUTO DIFF WBC: CPT

## 2023-05-01 PROCEDURE — 80048 BASIC METABOLIC PNL TOTAL CA: CPT

## 2023-05-01 PROCEDURE — 80202 ASSAY OF VANCOMYCIN: CPT

## 2023-05-02 ENCOUNTER — TELEPHONE (OUTPATIENT)
Dept: INFECTIOUS DISEASES | Age: 68
End: 2023-05-02

## 2023-05-02 LAB
ABSOLUTE EOS #: <0.03 K/UL (ref 0–0.44)
ABSOLUTE IMMATURE GRANULOCYTE: <0.03 K/UL (ref 0–0.3)
ABSOLUTE LYMPH #: 0.92 K/UL (ref 1.1–3.7)
ABSOLUTE MONO #: 0.47 K/UL (ref 0.1–1.2)
ANION GAP SERPL CALCULATED.3IONS-SCNC: 8 MMOL/L (ref 9–17)
BASOPHILS # BLD: 0 % (ref 0–2)
BASOPHILS ABSOLUTE: <0.03 K/UL (ref 0–0.2)
BUN SERPL-MCNC: 8 MG/DL (ref 8–23)
CALCIUM SERPL-MCNC: 8.4 MG/DL (ref 8.6–10.4)
CHLORIDE SERPL-SCNC: 101 MMOL/L (ref 98–107)
CO2 SERPL-SCNC: 31 MMOL/L (ref 20–31)
CREAT SERPL-MCNC: 0.83 MG/DL (ref 0.5–0.9)
EOSINOPHILS RELATIVE PERCENT: 0 % (ref 1–4)
GFR SERPL CREATININE-BSD FRML MDRD: >60 ML/MIN/1.73M2
GLUCOSE SERPL-MCNC: 100 MG/DL (ref 70–99)
HCT VFR BLD AUTO: 27 % (ref 36.3–47.1)
HGB BLD-MCNC: 8.5 G/DL (ref 11.9–15.1)
IMMATURE GRANULOCYTES: 0 %
LYMPHOCYTES # BLD: 20 % (ref 24–43)
MCH RBC QN AUTO: 29.2 PG (ref 25.2–33.5)
MCHC RBC AUTO-ENTMCNC: 31.5 G/DL (ref 28.4–34.8)
MCV RBC AUTO: 92.8 FL (ref 82.6–102.9)
MICROORGANISM SPEC CULT: ABNORMAL
MICROORGANISM/AGENT SPEC: ABNORMAL
MICROORGANISM/AGENT SPEC: ABNORMAL
MONOCYTES # BLD: 10 % (ref 3–12)
NRBC AUTOMATED: 0 PER 100 WBC
PDW BLD-RTO: 16.4 % (ref 11.8–14.4)
PLATELET # BLD AUTO: 151 K/UL (ref 138–453)
PMV BLD AUTO: 12 FL (ref 8.1–13.5)
POTASSIUM SERPL-SCNC: 4 MMOL/L (ref 3.7–5.3)
RBC # BLD: 2.91 M/UL (ref 3.95–5.11)
RBC # BLD: ABNORMAL 10*6/UL
SEG NEUTROPHILS: 70 % (ref 36–65)
SEGMENTED NEUTROPHILS ABSOLUTE COUNT: 3.21 K/UL (ref 1.5–8.1)
SODIUM SERPL-SCNC: 140 MMOL/L (ref 135–144)
SPECIMEN DESCRIPTION: ABNORMAL
VANCOMYCIN TROUGH DATE LAST DOSE: NORMAL
VANCOMYCIN TROUGH DOSE AMOUNT: NORMAL
VANCOMYCIN TROUGH TIME LAST DOSE: 800
VANCOMYCIN TROUGH: 19.9 UG/ML (ref 10–20)
WBC # BLD AUTO: 4.6 K/UL (ref 3.5–11.3)

## 2023-05-02 NOTE — TELEPHONE ENCOUNTER
Patient called stating her wound vac is due to shut off around 2 230 and her home care nurse told her it cant stay on for 2 days off before she comes to see you. Please advise on what you would like to do.  Thanks Casey Alvarado  Read 5/2/2023 1:54 PM  5/2/2023 1:54 PM  They can put that wound vac back on thats fine    Spoke with patient and informed

## 2023-05-02 NOTE — TELEPHONE ENCOUNTER
Patient called stating her wound vac is due to shut off around 2 230 and her home care nurse told her it cant stay on for 2 days off before she comes to see you. Please advise on what you would like to do.      Perfect serve message sent Attending Only

## 2023-05-03 NOTE — TELEPHONE ENCOUNTER
Patient had called this morning again about this matter due to the wound vac shutting off and they couldn't get  it to come back on. Patient stated that her leg was starting to hurt and swell from where the wound vac is. I spoke with Dr Jaqui Khan and he stated that patient would have to talk with her home nurse or call the surgeon due to him not prescribing. I spoke with patient and informed and patient stated that her home nurse was out and they called the number on the machine and the machine is only set to run for 1 week after that it will not come back on.   Patient stated that she will just have Dr. Yoel Clemons look at it tomorrow ar her appt

## 2023-05-04 ENCOUNTER — OFFICE VISIT (OUTPATIENT)
Dept: INFECTIOUS DISEASES | Age: 68
End: 2023-05-04
Payer: MEDICARE

## 2023-05-04 ENCOUNTER — TELEPHONE (OUTPATIENT)
Dept: ORTHOPEDIC SURGERY | Age: 68
End: 2023-05-04

## 2023-05-04 VITALS
DIASTOLIC BLOOD PRESSURE: 60 MMHG | SYSTOLIC BLOOD PRESSURE: 127 MMHG | HEART RATE: 57 BPM | HEIGHT: 70 IN | TEMPERATURE: 97 F | WEIGHT: 208 LBS | BODY MASS INDEX: 29.78 KG/M2

## 2023-05-04 DIAGNOSIS — T84.50XS PROSTHETIC JOINT INFECTION, SEQUELA: Primary | ICD-10-CM

## 2023-05-04 DIAGNOSIS — Z96.659 INFECTION OF PROSTHETIC KNEE JOINT, SUBSEQUENT ENCOUNTER: Primary | ICD-10-CM

## 2023-05-04 DIAGNOSIS — T84.59XD INFECTION OF PROSTHETIC KNEE JOINT, SUBSEQUENT ENCOUNTER: Primary | ICD-10-CM

## 2023-05-04 PROCEDURE — 3074F SYST BP LT 130 MM HG: CPT | Performed by: INTERNAL MEDICINE

## 2023-05-04 PROCEDURE — 99214 OFFICE O/P EST MOD 30 MIN: CPT | Performed by: INTERNAL MEDICINE

## 2023-05-04 PROCEDURE — 1123F ACP DISCUSS/DSCN MKR DOCD: CPT | Performed by: INTERNAL MEDICINE

## 2023-05-04 PROCEDURE — 3078F DIAST BP <80 MM HG: CPT | Performed by: INTERNAL MEDICINE

## 2023-05-04 RX ORDER — OXYCODONE HYDROCHLORIDE AND ACETAMINOPHEN 5; 325 MG/1; MG/1
1 TABLET ORAL EVERY 4 HOURS PRN
Qty: 42 TABLET | Refills: 0 | Status: CANCELLED | OUTPATIENT
Start: 2023-05-04 | End: 2023-05-11

## 2023-05-04 RX ORDER — OXYCODONE HYDROCHLORIDE AND ACETAMINOPHEN 5; 325 MG/1; MG/1
1 TABLET ORAL EVERY 6 HOURS PRN
Qty: 28 TABLET | Refills: 0 | Status: SHIPPED | OUTPATIENT
Start: 2023-05-04 | End: 2023-05-10 | Stop reason: SDUPTHER

## 2023-05-04 RX ORDER — OXYCODONE HYDROCHLORIDE AND ACETAMINOPHEN 5; 325 MG/1; MG/1
1 TABLET ORAL EVERY 4 HOURS PRN
COMMUNITY

## 2023-05-04 ASSESSMENT — ENCOUNTER SYMPTOMS
RESPIRATORY NEGATIVE: 1
GASTROINTESTINAL NEGATIVE: 1

## 2023-05-04 NOTE — PROGRESS NOTES
[facility]  Office Progress Note  Today's Date and Time: 5/4/2023, 9:30 AM    Impression:     1. Infection of prosthetic knee joint, subsequent encounter         Recommendations   Status post removal of infected prosthesis and placement of spacer with excisional debridement of skin subcutaneous tissues muscle fascia and bone  The surgical cultures have remained negative but the Gram stain did have gram-positive cocci in pairs suggesting streptococcal infection  The patient has been on intravenous antimicrobial therapy via a right upper extremity PICC line with Rocephin and vancomycin  I have recommended that she continue the current antimicrobial therapy through June 7, 2023 to complete a 6-week course of therapy  The patient will follow-up with me thereafter and we will monitor her progress of antimicrobial therapy to ensure no residual/recurrent infection  A soft dressing was applied and I did give her a second one that she could use if this dressing was to come off  The patient is to follow-up with her surgeon next week    I have ordered the following medications/ labs:  No orders of the defined types were placed in this encounter. No orders of the defined types were placed in this encounter.       Chief complaint/reason for consultation:     Chief Complaint   Patient presents with    Frequent Infections     Follow up         History of Present Illness:   Velma Boxer is a 79y.o.-year-old female who I am seeing in follow-up and underwent a left total knee arthroplasty in December 2022 and had postoperative drainage for which she underwent incision and drainage with irrigation debridement skin and subcutaneous tissues and then a second 2023 and the patient was seen in the office was noted to have concerns for infection and the patient was admitted to the hospital and underwent stage I to stage revision for left-sided knee joint infection    The patient underwent removal of infected

## 2023-05-05 ENCOUNTER — HOSPITAL ENCOUNTER (OUTPATIENT)
Age: 68
Setting detail: SPECIMEN
Discharge: HOME | End: 2023-05-05
Payer: MEDICARE

## 2023-05-05 LAB
ABSOLUTE EOS #: <0.03 K/UL (ref 0–0.44)
ABSOLUTE IMMATURE GRANULOCYTE: 0.01 K/UL (ref 0–0.3)
ABSOLUTE LYMPH #: 1.16 K/UL (ref 1.1–3.7)
ABSOLUTE MONO #: 0.37 K/UL (ref 0.1–1.2)
ANION GAP SERPL CALCULATED.3IONS-SCNC: 7 MMOL/L (ref 9–17)
BASOPHILS # BLD: 1 % (ref 0–2)
BASOPHILS ABSOLUTE: <0.03 K/UL (ref 0–0.2)
BUN SERPL-MCNC: 11 MG/DL (ref 8–23)
BUN/CREAT BLD: 13 (ref 9–20)
CALCIUM SERPL-MCNC: 8.3 MG/DL (ref 8.6–10.4)
CHLORIDE SERPL-SCNC: 101 MMOL/L (ref 98–107)
CO2 SERPL-SCNC: 30 MMOL/L (ref 20–31)
CREAT SERPL-MCNC: 0.83 MG/DL (ref 0.5–0.9)
EOSINOPHILS RELATIVE PERCENT: 0 % (ref 1–4)
GFR SERPL CREATININE-BSD FRML MDRD: >60 ML/MIN/1.73M2
GLUCOSE SERPL-MCNC: 107 MG/DL (ref 70–99)
HCT VFR BLD AUTO: 28.9 % (ref 36.3–47.1)
HGB BLD-MCNC: 8.7 G/DL (ref 11.9–15.1)
IMMATURE GRANULOCYTES: 0 %
LYMPHOCYTES # BLD: 26 % (ref 24–43)
MCH RBC QN AUTO: 28.8 PG (ref 25.2–33.5)
MCHC RBC AUTO-ENTMCNC: 30.1 G/DL (ref 28.4–34.8)
MCV RBC AUTO: 95.7 FL (ref 82.6–102.9)
MONOCYTES # BLD: 8 % (ref 3–12)
NRBC AUTOMATED: 0 PER 100 WBC
PDW BLD-RTO: 16.8 % (ref 11.8–14.4)
PLATELET # BLD AUTO: 160 K/UL (ref 138–453)
PMV BLD AUTO: 10.6 FL (ref 8.1–13.5)
POTASSIUM SERPL-SCNC: 4.1 MMOL/L (ref 3.7–5.3)
RBC # BLD: 3.02 M/UL (ref 3.95–5.11)
RBC # BLD: ABNORMAL 10*6/UL
SEG NEUTROPHILS: 65 % (ref 36–65)
SEGMENTED NEUTROPHILS ABSOLUTE COUNT: 2.88 K/UL (ref 1.5–8.1)
SODIUM SERPL-SCNC: 138 MMOL/L (ref 135–144)
VANCOMYCIN TROUGH: 11 UG/ML (ref 10–20)
WBC # BLD AUTO: 4.4 K/UL (ref 3.5–11.3)

## 2023-05-05 PROCEDURE — 80202 ASSAY OF VANCOMYCIN: CPT

## 2023-05-05 PROCEDURE — 80048 BASIC METABOLIC PNL TOTAL CA: CPT

## 2023-05-05 PROCEDURE — 85025 COMPLETE CBC W/AUTO DIFF WBC: CPT

## 2023-05-06 ENCOUNTER — HOSPITAL ENCOUNTER (EMERGENCY)
Age: 68
Discharge: LEFT BEFORE DISCHARGE | End: 2023-05-06

## 2023-05-08 ENCOUNTER — HOSPITAL ENCOUNTER (OUTPATIENT)
Age: 68
Setting detail: SPECIMEN
Discharge: HOME | End: 2023-05-08

## 2023-05-08 LAB
ABSOLUTE EOS #: <0.03 K/UL (ref 0–0.44)
ABSOLUTE IMMATURE GRANULOCYTE: <0.03 K/UL (ref 0–0.3)
ABSOLUTE LYMPH #: 1.25 K/UL (ref 1.1–3.7)
ABSOLUTE MONO #: 0.51 K/UL (ref 0.1–1.2)
ANION GAP SERPL CALCULATED.3IONS-SCNC: 10 MMOL/L (ref 9–17)
BASOPHILS # BLD: 1 % (ref 0–2)
BASOPHILS ABSOLUTE: 0.03 K/UL (ref 0–0.2)
BUN SERPL-MCNC: 14 MG/DL (ref 8–23)
CALCIUM SERPL-MCNC: 8.1 MG/DL (ref 8.6–10.4)
CHLORIDE SERPL-SCNC: 102 MMOL/L (ref 98–107)
CO2 SERPL-SCNC: 28 MMOL/L (ref 20–31)
CREAT SERPL-MCNC: 0.83 MG/DL (ref 0.5–0.9)
EOSINOPHILS RELATIVE PERCENT: 0 % (ref 1–4)
GFR SERPL CREATININE-BSD FRML MDRD: >60 ML/MIN/1.73M2
GLUCOSE SERPL-MCNC: 103 MG/DL (ref 70–99)
HCT VFR BLD AUTO: 29.8 % (ref 36.3–47.1)
HGB BLD-MCNC: 9.1 G/DL (ref 11.9–15.1)
IMMATURE GRANULOCYTES: 0 %
LYMPHOCYTES # BLD: 24 % (ref 24–43)
MCH RBC QN AUTO: 29.1 PG (ref 25.2–33.5)
MCHC RBC AUTO-ENTMCNC: 30.5 G/DL (ref 28.4–34.8)
MCV RBC AUTO: 95.2 FL (ref 82.6–102.9)
MONOCYTES # BLD: 10 % (ref 3–12)
NRBC AUTOMATED: 0 PER 100 WBC
PDW BLD-RTO: 17.2 % (ref 11.8–14.4)
PLATELET # BLD AUTO: 178 K/UL (ref 138–453)
PMV BLD AUTO: 10.8 FL (ref 8.1–13.5)
POTASSIUM SERPL-SCNC: 4.7 MMOL/L (ref 3.7–5.3)
RBC # BLD: 3.13 M/UL (ref 3.95–5.11)
RBC # BLD: ABNORMAL 10*6/UL
SEG NEUTROPHILS: 65 % (ref 36–65)
SEGMENTED NEUTROPHILS ABSOLUTE COUNT: 3.52 K/UL (ref 1.5–8.1)
SODIUM SERPL-SCNC: 140 MMOL/L (ref 135–144)
VANCOMYCIN TROUGH: 13.6 UG/ML (ref 10–20)
WBC # BLD AUTO: 5.3 K/UL (ref 3.5–11.3)

## 2023-05-09 DIAGNOSIS — Z96.652 HISTORY OF TOTAL KNEE ARTHROPLASTY, LEFT: Primary | ICD-10-CM

## 2023-05-10 DIAGNOSIS — T84.50XS PROSTHETIC JOINT INFECTION, SEQUELA: ICD-10-CM

## 2023-05-10 RX ORDER — OXYCODONE HYDROCHLORIDE AND ACETAMINOPHEN 5; 325 MG/1; MG/1
1 TABLET ORAL EVERY 6 HOURS PRN
Qty: 28 TABLET | Refills: 0 | Status: SHIPPED | OUTPATIENT
Start: 2023-05-10 | End: 2023-05-17

## 2023-05-11 ENCOUNTER — HOSPITAL ENCOUNTER (OUTPATIENT)
Age: 68
Setting detail: SPECIMEN
Discharge: HOME | End: 2023-05-11

## 2023-05-11 LAB
ABSOLUTE EOS #: 0.12 K/UL (ref 0–0.44)
ABSOLUTE IMMATURE GRANULOCYTE: <0.03 K/UL (ref 0–0.3)
ABSOLUTE LYMPH #: 0.99 K/UL (ref 1.1–3.7)
ABSOLUTE MONO #: 0.5 K/UL (ref 0.1–1.2)
ANION GAP SERPL CALCULATED.3IONS-SCNC: 9 MMOL/L (ref 9–17)
BASOPHILS # BLD: 1 % (ref 0–2)
BASOPHILS ABSOLUTE: 0.04 K/UL (ref 0–0.2)
BUN SERPL-MCNC: 16 MG/DL (ref 8–23)
CALCIUM SERPL-MCNC: 8.6 MG/DL (ref 8.6–10.4)
CHLORIDE SERPL-SCNC: 99 MMOL/L (ref 98–107)
CO2 SERPL-SCNC: 31 MMOL/L (ref 20–31)
CREAT SERPL-MCNC: 0.81 MG/DL (ref 0.5–0.9)
EOSINOPHILS RELATIVE PERCENT: 3 % (ref 1–4)
GFR SERPL CREATININE-BSD FRML MDRD: >60 ML/MIN/1.73M2
GLUCOSE SERPL-MCNC: 66 MG/DL (ref 70–99)
HCT VFR BLD AUTO: 30.1 % (ref 36.3–47.1)
HGB BLD-MCNC: 9.1 G/DL (ref 11.9–15.1)
IMMATURE GRANULOCYTES: 0 %
LYMPHOCYTES # BLD: 22 % (ref 24–43)
MCH RBC QN AUTO: 28.8 PG (ref 25.2–33.5)
MCHC RBC AUTO-ENTMCNC: 30.2 G/DL (ref 28.4–34.8)
MCV RBC AUTO: 95.3 FL (ref 82.6–102.9)
MONOCYTES # BLD: 11 % (ref 3–12)
NRBC AUTOMATED: 0 PER 100 WBC
PDW BLD-RTO: 17 % (ref 11.8–14.4)
PLATELET # BLD AUTO: 184 K/UL (ref 138–453)
PMV BLD AUTO: 12 FL (ref 8.1–13.5)
POTASSIUM SERPL-SCNC: 4.3 MMOL/L (ref 3.7–5.3)
RBC # BLD: 3.16 M/UL (ref 3.95–5.11)
RBC # BLD: ABNORMAL 10*6/UL
SEG NEUTROPHILS: 63 % (ref 36–65)
SEGMENTED NEUTROPHILS ABSOLUTE COUNT: 2.94 K/UL (ref 1.5–8.1)
SODIUM SERPL-SCNC: 139 MMOL/L (ref 135–144)
VANCOMYCIN TROUGH: 14.5 UG/ML (ref 10–20)
WBC # BLD AUTO: 4.6 K/UL (ref 3.5–11.3)

## 2023-05-12 ENCOUNTER — OFFICE VISIT (OUTPATIENT)
Dept: ORTHOPEDIC SURGERY | Age: 68
End: 2023-05-12

## 2023-05-12 VITALS — BODY MASS INDEX: 29.78 KG/M2 | RESPIRATION RATE: 14 BRPM | WEIGHT: 208 LBS | HEIGHT: 70 IN

## 2023-05-12 DIAGNOSIS — Z89.529 ACQUIRED ABSENCE OF KNEE JOINT FOLLOWING REMOVAL OF JOINT PROSTHESIS WITH PRESENCE OF ANTIBIOTIC-IMPREGNATED CEMENT SPACER: ICD-10-CM

## 2023-05-12 DIAGNOSIS — T84.50XS PROSTHETIC JOINT INFECTION, SEQUELA: Primary | ICD-10-CM

## 2023-05-12 NOTE — PROGRESS NOTES
[address] [place] [address] AND SPORTS MEDICINE  [facility] [address]  Dept: [phone]  Dept Fax: [phone]        Postoperative follow-up note    Subjective:   Yosi Wilson is a 79y.o. year old female who presents to our office today for postoperative followup regarding her   1. Prosthetic joint infection, sequela    2. 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 Chief Complaint   Patient presents with    Post-Op Check     L TKA Exc./Spacer Placement dos 4/25/23       Date of Surgery: 4/25/2023  2.5 weeks post-op    Yosi Wilson  is a 79y.o. year old female who presents to our office today for postoperative follow up after having undergone a prosthesis and placement of spacer on 4/25/2023 completed with Dr Danyelle Goss DO. The patient denies fevers, chills, nausea, vomiting, diarrhea. The patient has not started physical therapy. Patient is currently on IV Rocephin and vancomycin as per infectious disease. Patient has a PICC line in place. She will complete her IV antibiotics on June 7. She is being followed by Dr. Karley Cade. The patient notes that she is taking Percocet for her post-operative pain. She notes she is using a walker while at home and a wheelchair when out of the house. Review of Systems   Constitutional:  Negative for activity change and fever. HENT:  Negative for sneezing. Respiratory:  Negative for cough and shortness of breath. Cardiovascular:  Negative for chest pain. Gastrointestinal:  Negative for vomiting. Musculoskeletal:  Positive for arthralgias (left knee). Negative for joint swelling and myalgias. Skin:  Negative for color change. Neurological:  Negative for weakness and numbness. Psychiatric/Behavioral:  Negative for sleep disturbance.           Objective :

## 2023-05-14 ASSESSMENT — ENCOUNTER SYMPTOMS
VOMITING: 0
COUGH: 0
SHORTNESS OF BREATH: 0
COLOR CHANGE: 0

## 2023-05-15 ENCOUNTER — HOSPITAL ENCOUNTER (OUTPATIENT)
Age: 68
Setting detail: SPECIMEN
Discharge: HOME | End: 2023-05-15

## 2023-05-15 LAB
ANION GAP SERPL CALCULATED.3IONS-SCNC: 13 MMOL/L (ref 9–17)
BASOPHILS # BLD: 0.04 K/UL (ref 0–0.2)
BASOPHILS # BLD: 1 % (ref 0–2)
BUN SERPL-MCNC: 14 MG/DL (ref 8–23)
CALCIUM SERPL-MCNC: 9 MG/DL (ref 8.6–10.4)
CHLORIDE SERPL-SCNC: 101 MMOL/L (ref 98–107)
CO2 SERPL-SCNC: 25 MMOL/L (ref 20–31)
CREAT SERPL-MCNC: 0.87 MG/DL (ref 0.5–0.9)
EOSINOPHIL # BLD: <0.03 K/UL (ref 0–0.44)
EOSINOPHILS RELATIVE PERCENT: 0 % (ref 1–4)
ERYTHROCYTE [DISTWIDTH] IN BLOOD BY AUTOMATED COUNT: 16.2 % (ref 11.8–14.4)
GFR SERPL CREATININE-BSD FRML MDRD: >60 ML/MIN/1.73M2
GLUCOSE SERPL-MCNC: 91 MG/DL (ref 70–99)
HCT VFR BLD AUTO: 31.5 % (ref 36.3–47.1)
HGB BLD-MCNC: 9.8 G/DL (ref 11.9–15.1)
IMM GRANULOCYTES # BLD AUTO: <0.03 K/UL (ref 0–0.3)
IMM GRANULOCYTES NFR BLD: 0 %
LYMPHOCYTES # BLD: 20 % (ref 24–43)
LYMPHOCYTES NFR BLD: 1.08 K/UL (ref 1.1–3.7)
MCH RBC QN AUTO: 28.8 PG (ref 25.2–33.5)
MCHC RBC AUTO-ENTMCNC: 31.1 G/DL (ref 28.4–34.8)
MCV RBC AUTO: 92.6 FL (ref 82.6–102.9)
MONOCYTES NFR BLD: 0.82 K/UL (ref 0.1–1.2)
MONOCYTES NFR BLD: 15 % (ref 3–12)
NEUTROPHILS NFR BLD: 64 % (ref 36–65)
NEUTS SEG NFR BLD: 3.49 K/UL (ref 1.5–8.1)
NRBC AUTOMATED: 0 PER 100 WBC
PLATELET # BLD AUTO: 201 K/UL (ref 138–453)
PMV BLD AUTO: 12.1 FL (ref 8.1–13.5)
POTASSIUM SERPL-SCNC: 4.4 MMOL/L (ref 3.7–5.3)
RBC # BLD AUTO: 3.4 M/UL (ref 3.95–5.11)
RBC # BLD: ABNORMAL 10*6/UL
SODIUM SERPL-SCNC: 139 MMOL/L (ref 135–144)
VANCOMYCIN TROUGH SERPL-MCNC: 12.3 UG/ML (ref 10–20)
WBC OTHER # BLD: 5.5 K/UL (ref 3.5–11.3)

## 2023-05-16 ENCOUNTER — TELEPHONE (OUTPATIENT)
Dept: ORTHOPEDIC SURGERY | Age: 68
End: 2023-05-16

## 2023-05-17 ENCOUNTER — TELEPHONE (OUTPATIENT)
Dept: ORTHOPEDIC SURGERY | Age: 68
End: 2023-05-17

## 2023-05-17 DIAGNOSIS — T84.50XS PROSTHETIC JOINT INFECTION, SEQUELA: ICD-10-CM

## 2023-05-17 RX ORDER — OXYCODONE HYDROCHLORIDE AND ACETAMINOPHEN 5; 325 MG/1; MG/1
1 TABLET ORAL EVERY 6 HOURS PRN
Qty: 28 TABLET | Refills: 0 | Status: SHIPPED | OUTPATIENT
Start: 2023-05-17 | End: 2023-05-24

## 2023-05-17 NOTE — TELEPHONE ENCOUNTER
Pt is postop  4/25/2023  Lt TKA  prosthesis rem w/placement spacers -- pt is req refill on pain med - [facility] [id number]  Thank you   Respectfully//bb

## 2023-05-18 ENCOUNTER — HOSPITAL ENCOUNTER (OUTPATIENT)
Age: 68
Setting detail: SPECIMEN
Discharge: HOME | End: 2023-05-18

## 2023-05-18 LAB
ANION GAP SERPL CALCULATED.3IONS-SCNC: 9 MMOL/L (ref 9–17)
BASOPHILS # BLD: <0.03 K/UL (ref 0–0.2)
BASOPHILS NFR BLD: 1 % (ref 0–2)
BUN SERPL-MCNC: 10 MG/DL (ref 8–23)
CALCIUM SERPL-MCNC: 8.8 MG/DL (ref 8.6–10.4)
CHLORIDE SERPL-SCNC: 101 MMOL/L (ref 98–107)
CO2 SERPL-SCNC: 27 MMOL/L (ref 20–31)
CREAT SERPL-MCNC: 0.74 MG/DL (ref 0.5–0.9)
EOSINOPHIL # BLD: <0.03 K/UL (ref 0–0.44)
EOSINOPHILS RELATIVE PERCENT: 0 % (ref 1–4)
ERYTHROCYTE [DISTWIDTH] IN BLOOD BY AUTOMATED COUNT: 16.2 % (ref 11.8–14.4)
GFR SERPL CREATININE-BSD FRML MDRD: >60 ML/MIN/1.73M2
GLUCOSE SERPL-MCNC: 86 MG/DL (ref 70–99)
HCT VFR BLD AUTO: 32.1 % (ref 36.3–47.1)
HGB BLD-MCNC: 9.8 G/DL (ref 11.9–15.1)
IMM GRANULOCYTES # BLD AUTO: <0.03 K/UL (ref 0–0.3)
IMM GRANULOCYTES NFR BLD: 0 %
LYMPHOCYTES # BLD: 19 % (ref 24–43)
LYMPHOCYTES NFR BLD: 0.73 K/UL (ref 1.1–3.7)
MCH RBC QN AUTO: 27.8 PG (ref 25.2–33.5)
MCHC RBC AUTO-ENTMCNC: 30.5 G/DL (ref 28.4–34.8)
MCV RBC AUTO: 91.2 FL (ref 82.6–102.9)
MONOCYTES NFR BLD: 0.43 K/UL (ref 0.1–1.2)
MONOCYTES NFR BLD: 11 % (ref 3–12)
NEUTROPHILS NFR BLD: 69 % (ref 36–65)
NEUTS SEG NFR BLD: 2.75 K/UL (ref 1.5–8.1)
NRBC AUTOMATED: 0 PER 100 WBC
PLATELET # BLD AUTO: 174 K/UL (ref 138–453)
PMV BLD AUTO: 11.8 FL (ref 8.1–13.5)
POTASSIUM SERPL-SCNC: 4 MMOL/L (ref 3.7–5.3)
RBC # BLD AUTO: 3.52 M/UL (ref 3.95–5.11)
RBC # BLD: ABNORMAL 10*6/UL
SODIUM SERPL-SCNC: 137 MMOL/L (ref 135–144)
VANCOMYCIN TROUGH SERPL-MCNC: 12.1 UG/ML (ref 10–20)
WBC OTHER # BLD: 3.9 K/UL (ref 3.5–11.3)

## 2023-05-22 ENCOUNTER — HOSPITAL ENCOUNTER (OUTPATIENT)
Age: 68
Setting detail: SPECIMEN
Discharge: HOME | End: 2023-05-22

## 2023-05-22 LAB
ANION GAP SERPL CALCULATED.3IONS-SCNC: 7 MMOL/L (ref 9–17)
BUN SERPL-MCNC: 12 MG/DL (ref 8–23)
CALCIUM SERPL-MCNC: 8.9 MG/DL (ref 8.6–10.4)
CHLORIDE SERPL-SCNC: 101 MMOL/L (ref 98–107)
CO2 SERPL-SCNC: 30 MMOL/L (ref 20–31)
CREAT SERPL-MCNC: 0.83 MG/DL (ref 0.5–0.9)
ERYTHROCYTE [DISTWIDTH] IN BLOOD BY AUTOMATED COUNT: 16 % (ref 11.8–14.4)
GFR SERPL CREATININE-BSD FRML MDRD: >60 ML/MIN/1.73M2
GLUCOSE SERPL-MCNC: 113 MG/DL (ref 70–99)
HCT VFR BLD AUTO: 32.5 % (ref 36.3–47.1)
HGB BLD-MCNC: 9.6 G/DL (ref 11.9–15.1)
MCH RBC QN AUTO: 27.1 PG (ref 25.2–33.5)
MCHC RBC AUTO-ENTMCNC: 29.5 G/DL (ref 28.4–34.8)
MCV RBC AUTO: 91.8 FL (ref 82.6–102.9)
NRBC AUTOMATED: 0 PER 100 WBC
PLATELET # BLD AUTO: 170 K/UL (ref 138–453)
PMV BLD AUTO: 12 FL (ref 8.1–13.5)
POTASSIUM SERPL-SCNC: 4.4 MMOL/L (ref 3.7–5.3)
RBC # BLD AUTO: 3.54 M/UL (ref 3.95–5.11)
SODIUM SERPL-SCNC: 138 MMOL/L (ref 135–144)
VANCOMYCIN TROUGH SERPL-MCNC: 11.3 UG/ML (ref 10–20)
WBC OTHER # BLD: 4.1 K/UL (ref 3.5–11.3)

## 2023-05-25 ENCOUNTER — HOSPITAL ENCOUNTER (OUTPATIENT)
Age: 68
Setting detail: SPECIMEN
Discharge: HOME | End: 2023-05-25

## 2023-05-25 LAB
ANION GAP SERPL CALCULATED.3IONS-SCNC: 13 MMOL/L (ref 9–17)
BASOPHILS # BLD: <0.03 K/UL (ref 0–0.2)
BASOPHILS NFR BLD: 1 % (ref 0–2)
BUN SERPL-MCNC: 11 MG/DL (ref 8–23)
CALCIUM SERPL-MCNC: 8.5 MG/DL (ref 8.6–10.4)
CHLORIDE SERPL-SCNC: 103 MMOL/L (ref 98–107)
CO2 SERPL-SCNC: 26 MMOL/L (ref 20–31)
CREAT SERPL-MCNC: 0.8 MG/DL (ref 0.5–0.9)
EOSINOPHIL # BLD: <0.03 K/UL (ref 0–0.44)
EOSINOPHILS RELATIVE PERCENT: 0 % (ref 1–4)
ERYTHROCYTE [DISTWIDTH] IN BLOOD BY AUTOMATED COUNT: 15.8 % (ref 11.8–14.4)
GFR SERPL CREATININE-BSD FRML MDRD: >60 ML/MIN/1.73M2
GLUCOSE SERPL-MCNC: 108 MG/DL (ref 70–99)
HCT VFR BLD AUTO: 32.5 % (ref 36.3–47.1)
HGB BLD-MCNC: 9.9 G/DL (ref 11.9–15.1)
IMM GRANULOCYTES # BLD AUTO: <0.03 K/UL (ref 0–0.3)
IMM GRANULOCYTES NFR BLD: 0 %
LYMPHOCYTES # BLD: 19 % (ref 24–43)
LYMPHOCYTES NFR BLD: 0.8 K/UL (ref 1.1–3.7)
MCH RBC QN AUTO: 27.5 PG (ref 25.2–33.5)
MCHC RBC AUTO-ENTMCNC: 30.5 G/DL (ref 28.4–34.8)
MCV RBC AUTO: 90.3 FL (ref 82.6–102.9)
MONOCYTES NFR BLD: 0.6 K/UL (ref 0.1–1.2)
MONOCYTES NFR BLD: 14 % (ref 3–12)
NEUTROPHILS NFR BLD: 66 % (ref 36–65)
NEUTS SEG NFR BLD: 2.89 K/UL (ref 1.5–8.1)
NRBC AUTOMATED: 0 PER 100 WBC
PLATELET # BLD AUTO: 149 K/UL (ref 138–453)
PMV BLD AUTO: 11.2 FL (ref 8.1–13.5)
POTASSIUM SERPL-SCNC: 4.4 MMOL/L (ref 3.7–5.3)
RBC # BLD AUTO: 3.6 M/UL (ref 3.95–5.11)
RBC # BLD: ABNORMAL 10*6/UL
SODIUM SERPL-SCNC: 142 MMOL/L (ref 135–144)
VANCOMYCIN TROUGH SERPL-MCNC: 11.8 UG/ML (ref 10–20)
WBC OTHER # BLD: 4.3 K/UL (ref 3.5–11.3)

## 2023-05-30 ENCOUNTER — HOSPITAL ENCOUNTER (OUTPATIENT)
Age: 68
Setting detail: SPECIMEN
Discharge: HOME | End: 2023-05-30

## 2023-05-30 LAB
ANION GAP SERPL CALCULATED.3IONS-SCNC: 10 MMOL/L (ref 9–17)
BASOPHILS # BLD: <0.03 K/UL (ref 0–0.2)
BASOPHILS NFR BLD: 1 % (ref 0–2)
BUN SERPL-MCNC: 8 MG/DL (ref 8–23)
CALCIUM SERPL-MCNC: 8.6 MG/DL (ref 8.6–10.4)
CHLORIDE SERPL-SCNC: 100 MMOL/L (ref 98–107)
CO2 SERPL-SCNC: 28 MMOL/L (ref 20–31)
CREAT SERPL-MCNC: 0.81 MG/DL (ref 0.5–0.9)
EOSINOPHIL # BLD: <0.03 K/UL (ref 0–0.44)
EOSINOPHILS RELATIVE PERCENT: 0 % (ref 1–4)
ERYTHROCYTE [DISTWIDTH] IN BLOOD BY AUTOMATED COUNT: 15.9 % (ref 11.8–14.4)
GFR SERPL CREATININE-BSD FRML MDRD: >60 ML/MIN/1.73M2
GLUCOSE SERPL-MCNC: 75 MG/DL (ref 70–99)
HCT VFR BLD AUTO: 33.3 % (ref 36.3–47.1)
HGB BLD-MCNC: 9.9 G/DL (ref 11.9–15.1)
IMM GRANULOCYTES # BLD AUTO: <0.03 K/UL (ref 0–0.3)
IMM GRANULOCYTES NFR BLD: 0 %
LYMPHOCYTES # BLD: 24 % (ref 24–43)
LYMPHOCYTES NFR BLD: 1.04 K/UL (ref 1.1–3.7)
MCH RBC QN AUTO: 26.5 PG (ref 25.2–33.5)
MCHC RBC AUTO-ENTMCNC: 29.7 G/DL (ref 28.4–34.8)
MCV RBC AUTO: 89.3 FL (ref 82.6–102.9)
MONOCYTES NFR BLD: 0.63 K/UL (ref 0.1–1.2)
MONOCYTES NFR BLD: 14 % (ref 3–12)
NEUTROPHILS NFR BLD: 61 % (ref 36–65)
NEUTS SEG NFR BLD: 2.68 K/UL (ref 1.5–8.1)
NRBC AUTOMATED: 0 PER 100 WBC
PLATELET # BLD AUTO: 171 K/UL (ref 138–453)
PMV BLD AUTO: 12.2 FL (ref 8.1–13.5)
POTASSIUM SERPL-SCNC: 4.1 MMOL/L (ref 3.7–5.3)
RBC # BLD AUTO: 3.73 M/UL (ref 3.95–5.11)
RBC # BLD: ABNORMAL 10*6/UL
SODIUM SERPL-SCNC: 138 MMOL/L (ref 135–144)
VANCOMYCIN TROUGH SERPL-MCNC: 8.4 UG/ML (ref 10–20)
WBC OTHER # BLD: 4.4 K/UL (ref 3.5–11.3)

## 2023-06-02 ENCOUNTER — HOSPITAL ENCOUNTER (OUTPATIENT)
Age: 68
Setting detail: SPECIMEN
Discharge: HOME | End: 2023-06-02

## 2023-06-02 LAB
BASOPHILS # BLD: <0.03 K/UL (ref 0–0.2)
BASOPHILS NFR BLD: 1 % (ref 0–2)
EOSINOPHIL # BLD: <0.03 K/UL (ref 0–0.44)
EOSINOPHILS RELATIVE PERCENT: 0 % (ref 1–4)
ERYTHROCYTE [DISTWIDTH] IN BLOOD BY AUTOMATED COUNT: 15.7 % (ref 11.8–14.4)
HCT VFR BLD AUTO: 38 % (ref 36.3–47.1)
HGB BLD-MCNC: 11.4 G/DL (ref 11.9–15.1)
IMM GRANULOCYTES # BLD AUTO: <0.03 K/UL (ref 0–0.3)
IMM GRANULOCYTES NFR BLD: 0 %
LYMPHOCYTES # BLD: 19 % (ref 24–43)
LYMPHOCYTES NFR BLD: 0.77 K/UL (ref 1.1–3.7)
MCH RBC QN AUTO: 26.5 PG (ref 25.2–33.5)
MCHC RBC AUTO-ENTMCNC: 30 G/DL (ref 28.4–34.8)
MCV RBC AUTO: 88.2 FL (ref 82.6–102.9)
MONOCYTES NFR BLD: 0.43 K/UL (ref 0.1–1.2)
MONOCYTES NFR BLD: 11 % (ref 3–12)
NEUTROPHILS NFR BLD: 69 % (ref 36–65)
NEUTS SEG NFR BLD: 2.8 K/UL (ref 1.5–8.1)
NRBC AUTOMATED: 0 PER 100 WBC
PLATELET # BLD AUTO: 179 K/UL (ref 138–453)
PMV BLD AUTO: 11.6 FL (ref 8.1–13.5)
RBC # BLD AUTO: 4.31 M/UL (ref 3.95–5.11)
RBC # BLD: ABNORMAL 10*6/UL
WBC OTHER # BLD: 4 K/UL (ref 3.5–11.3)

## 2023-06-03 LAB
ANION GAP SERPL CALCULATED.3IONS-SCNC: 17 MMOL/L (ref 9–17)
BUN SERPL-MCNC: 10 MG/DL (ref 8–23)
CALCIUM SERPL-MCNC: 9.5 MG/DL (ref 8.6–10.4)
CHLORIDE SERPL-SCNC: 100 MMOL/L (ref 98–107)
CO2 SERPL-SCNC: 23 MMOL/L (ref 20–31)
CREAT SERPL-MCNC: 0.85 MG/DL (ref 0.5–0.9)
GFR SERPL CREATININE-BSD FRML MDRD: >60 ML/MIN/1.73M2
GLUCOSE SERPL-MCNC: 81 MG/DL (ref 70–99)
POTASSIUM SERPL-SCNC: 3.9 MMOL/L (ref 3.7–5.3)
SODIUM SERPL-SCNC: 140 MMOL/L (ref 135–144)
VANCOMYCIN TROUGH SERPL-MCNC: 8.6 UG/ML (ref 10–20)

## 2023-06-05 ENCOUNTER — HOSPITAL ENCOUNTER (OUTPATIENT)
Age: 68
Setting detail: SPECIMEN
Discharge: HOME | End: 2023-06-05

## 2023-06-05 LAB
ANION GAP SERPL CALCULATED.3IONS-SCNC: 13 MMOL/L (ref 9–17)
BASOPHILS # BLD: <0.03 K/UL (ref 0–0.2)
BASOPHILS NFR BLD: 0 % (ref 0–2)
BUN SERPL-MCNC: 17 MG/DL (ref 8–23)
CALCIUM SERPL-MCNC: 9.2 MG/DL (ref 8.6–10.4)
CHLORIDE SERPL-SCNC: 100 MMOL/L (ref 98–107)
CO2 SERPL-SCNC: 25 MMOL/L (ref 20–31)
CREAT SERPL-MCNC: 0.82 MG/DL (ref 0.5–0.9)
EOSINOPHIL # BLD: <0.03 K/UL (ref 0–0.44)
EOSINOPHILS RELATIVE PERCENT: 0 % (ref 1–4)
ERYTHROCYTE [DISTWIDTH] IN BLOOD BY AUTOMATED COUNT: 15.8 % (ref 11.8–14.4)
GFR SERPL CREATININE-BSD FRML MDRD: >60 ML/MIN/1.73M2
GLUCOSE SERPL-MCNC: 110 MG/DL (ref 70–99)
HCT VFR BLD AUTO: 32.9 % (ref 36.3–47.1)
HGB BLD-MCNC: 9.8 G/DL (ref 11.9–15.1)
IMM GRANULOCYTES # BLD AUTO: <0.03 K/UL (ref 0–0.3)
IMM GRANULOCYTES NFR BLD: 0 %
LYMPHOCYTES # BLD: 15 % (ref 24–43)
LYMPHOCYTES NFR BLD: 0.73 K/UL (ref 1.1–3.7)
MCH RBC QN AUTO: 26.2 PG (ref 25.2–33.5)
MCHC RBC AUTO-ENTMCNC: 29.8 G/DL (ref 28.4–34.8)
MCV RBC AUTO: 88 FL (ref 82.6–102.9)
MONOCYTES NFR BLD: 0.5 K/UL (ref 0.1–1.2)
MONOCYTES NFR BLD: 11 % (ref 3–12)
NEUTROPHILS NFR BLD: 74 % (ref 36–65)
NEUTS SEG NFR BLD: 3.49 K/UL (ref 1.5–8.1)
NRBC AUTOMATED: 0 PER 100 WBC
PLATELET # BLD AUTO: 179 K/UL (ref 138–453)
PMV BLD AUTO: 12.1 FL (ref 8.1–13.5)
POTASSIUM SERPL-SCNC: 4 MMOL/L (ref 3.7–5.3)
RBC # BLD AUTO: 3.74 M/UL (ref 3.95–5.11)
RBC # BLD: ABNORMAL 10*6/UL
SODIUM SERPL-SCNC: 138 MMOL/L (ref 135–144)
WBC OTHER # BLD: 4.8 K/UL (ref 3.5–11.3)

## 2023-06-06 LAB — VANCOMYCIN TROUGH SERPL-MCNC: 14.6 UG/ML (ref 10–20)

## 2023-06-16 RX ORDER — CELECOXIB 200 MG/1
CAPSULE ORAL
Qty: 60 CAPSULE | Refills: 0 | OUTPATIENT
Start: 2023-06-16

## 2023-06-16 NOTE — TELEPHONE ENCOUNTER
Writer called patient to confirm if she takes Celebrex or not. Patient states she doesn't take Celebrex. Nothing further needed. Thank you.

## 2023-06-20 ENCOUNTER — HOSPITAL ENCOUNTER (OUTPATIENT)
Age: 68
Setting detail: SPECIMEN
Discharge: HOME | End: 2023-06-20

## 2023-06-20 DIAGNOSIS — T84.59XD INFECTION OF PROSTHETIC KNEE JOINT, SUBSEQUENT ENCOUNTER: ICD-10-CM

## 2023-06-20 DIAGNOSIS — Z96.659 INFECTION OF PROSTHETIC KNEE JOINT, SUBSEQUENT ENCOUNTER: ICD-10-CM

## 2023-06-20 LAB
ANION GAP SERPL CALCULATED.3IONS-SCNC: 10 MMOL/L (ref 9–17)
BUN SERPL-MCNC: 17 MG/DL (ref 8–23)
CALCIUM SERPL-MCNC: 9.3 MG/DL (ref 8.6–10.4)
CHLORIDE SERPL-SCNC: 101 MMOL/L (ref 98–107)
CO2 SERPL-SCNC: 28 MMOL/L (ref 20–31)
CREAT SERPL-MCNC: 0.92 MG/DL (ref 0.5–0.9)
CRP SERPL HS-MCNC: 4.6 MG/L (ref 0–5)
ERYTHROCYTE [DISTWIDTH] IN BLOOD BY AUTOMATED COUNT: 15.7 % (ref 11.8–14.4)
GFR SERPL CREATININE-BSD FRML MDRD: >60 ML/MIN/1.73M2
GLUCOSE SERPL-MCNC: 84 MG/DL (ref 70–99)
HCT VFR BLD AUTO: 36.6 % (ref 36.3–47.1)
HGB BLD-MCNC: 10.3 G/DL (ref 11.9–15.1)
MCH RBC QN AUTO: 25.6 PG (ref 25.2–33.5)
MCHC RBC AUTO-ENTMCNC: 28.1 G/DL (ref 28.4–34.8)
MCV RBC AUTO: 90.8 FL (ref 82.6–102.9)
NRBC AUTOMATED: 0 PER 100 WBC
PLATELET # BLD AUTO: 184 K/UL (ref 138–453)
PMV BLD AUTO: 12.7 FL (ref 8.1–13.5)
POTASSIUM SERPL-SCNC: 4.7 MMOL/L (ref 3.7–5.3)
RBC # BLD AUTO: 4.03 M/UL (ref 3.95–5.11)
SODIUM SERPL-SCNC: 139 MMOL/L (ref 135–144)
WBC OTHER # BLD: 4.5 K/UL (ref 3.5–11.3)

## 2023-06-27 ENCOUNTER — CLINICAL DOCUMENTATION (OUTPATIENT)
Dept: SPIRITUAL SERVICES | Age: 68
End: 2023-06-27

## 2023-07-03 ENCOUNTER — CLINICAL DOCUMENTATION (OUTPATIENT)
Dept: SPIRITUAL SERVICES | Age: 68
End: 2023-07-03

## 2023-07-05 ENCOUNTER — CLINICAL DOCUMENTATION (OUTPATIENT)
Dept: SPIRITUAL SERVICES | Age: 68
End: 2023-07-05

## 2023-07-05 NOTE — ACP (ADVANCE CARE PLANNING)
to resuscitate)? \"  Pt shared she knows \"this is for the future and I don't want cpr, vent if I am not able to recover. \"       [x] Yes   [] No   Educated Patient / [place] regarding differences between Advance Directives and portable DNR orders. Length of ACP Conversation in minutes:  20    Conversation Outcomes:  ACP discussion completed  Next visit: Wed July 19 at 10am    Follow-up plan:    [x] Schedule follow-up conversation to continue planning  [] Referred individual to Provider for additional questions/concerns   [] Advised patient/agent/surrogate to review completed ACP document and update if needed with changes in condition, patient preferences or care setting    [] This note routed to one or more involved healthcare providers    Summary:  Met with pt to offer ACP education, support. Explored pt's understanding and offered education around placing wishes in writing for future healthcare decisions. Pt shared she would want her daughter, Cliff Garcia, to make medical decisions if she is unable to and would like to complete HCPOA. Care preferences were reviewed as well. Sending pt an ACP packet to review (she would like to complete living will as well). Did discuss with pt docusign and need for document to be witnessed or notarized. Will plan to meet with pt again Wed July 19 at 10am to continue ACP discussion. Pt has SW contact info and was encouraged to reach out as needed. Thanked pt for her time today; will continue to follow.

## 2023-07-12 ENCOUNTER — OFFICE VISIT (OUTPATIENT)
Dept: INFECTIOUS DISEASES | Age: 68
End: 2023-07-12
Payer: MEDICARE

## 2023-07-12 ENCOUNTER — TELEPHONE (OUTPATIENT)
Dept: ORTHOPEDIC SURGERY | Age: 68
End: 2023-07-12

## 2023-07-12 VITALS
TEMPERATURE: 97 F | BODY MASS INDEX: 30.78 KG/M2 | HEART RATE: 69 BPM | HEIGHT: 70 IN | WEIGHT: 215 LBS | SYSTOLIC BLOOD PRESSURE: 132 MMHG | DIASTOLIC BLOOD PRESSURE: 75 MMHG

## 2023-07-12 DIAGNOSIS — Z96.659 INFECTION OF PROSTHETIC KNEE JOINT, SUBSEQUENT ENCOUNTER: Primary | ICD-10-CM

## 2023-07-12 DIAGNOSIS — T84.59XD INFECTION OF PROSTHETIC KNEE JOINT, SUBSEQUENT ENCOUNTER: Primary | ICD-10-CM

## 2023-07-12 PROCEDURE — 3074F SYST BP LT 130 MM HG: CPT | Performed by: INTERNAL MEDICINE

## 2023-07-12 PROCEDURE — 3078F DIAST BP <80 MM HG: CPT | Performed by: INTERNAL MEDICINE

## 2023-07-12 PROCEDURE — 1124F ACP DISCUSS-NO DSCNMKR DOCD: CPT | Performed by: INTERNAL MEDICINE

## 2023-07-12 PROCEDURE — 99213 OFFICE O/P EST LOW 20 MIN: CPT | Performed by: INTERNAL MEDICINE

## 2023-07-12 NOTE — PROGRESS NOTES
[facility]  Office Progress Note  Today's Date and Time: 7/12/2023, 9:53 AM    Impression:     1. Infection of prosthetic knee joint, subsequent encounter         Recommendations   The patient is doing well clinically and the inflammatory markers have remained stable. The patient also has less erythema of the left knee. At this point in time I do not see any clinical findings to suggest recurrent infection or persistent infection and the patient is okay to proceed with the second stage revision. I have ordered the following medications/ labs:  Orders Placed This Encounter   Procedures    C-Reactive Protein     Standing Status:   Future     Standing Expiration Date:   8/12/2023      No orders of the defined types were placed in this encounter. Chief complaint/reason for consultation:     Chief Complaint   Patient presents with    Frequent Infections     Follow up         History of Present Illness:   Sara Parikh is a 79y.o.-year-old female who I am seeing in follow-up and has a history of a left prosthetic knee joint infection for which she underwent removal of the hardware and placement of antibiotic spacer with excision now debridement of the skin and subcutaneous tissue, muscle, fascia and bone. The patient received intravenous antimicrobial therapy with Rocephin and completed course on June 7, 2023    The patient comes in now and has been off antibiotic therapy since that time and reports that she has had improvement in her erythema over the left knee and does not really have any pain she is doing very well continues to ambulate and there are plans for second stage revision in the near future. The patient is here with her daughter    I have personally reviewedthe past medical history, medications, social history, and I have updated the database accordingly.   Past Medical History:     Past Medical History:   Diagnosis Date    Chronic back pain     S/P L4/L5 OR    Hypertension

## 2023-07-12 NOTE — TELEPHONE ENCOUNTER
Pt is pt of DR Sara Harley -- called today stating she saw DR Yamini Grayson - pt stating to pt - no infection - knee looks good and is ok to proceed with further surgery - please advise pt  Respectfully/bb

## 2023-07-13 ENCOUNTER — HOSPITAL ENCOUNTER (OUTPATIENT)
Age: 68
Setting detail: SPECIMEN
Discharge: HOME | End: 2023-07-13

## 2023-07-13 DIAGNOSIS — Z96.659 INFECTION OF PROSTHETIC KNEE JOINT, SUBSEQUENT ENCOUNTER: ICD-10-CM

## 2023-07-13 DIAGNOSIS — E03.9 ACQUIRED HYPOTHYROIDISM: ICD-10-CM

## 2023-07-13 DIAGNOSIS — R79.9 ABNORMAL FINDING OF BLOOD CHEMISTRY, UNSPECIFIED: ICD-10-CM

## 2023-07-13 DIAGNOSIS — R73.9 HYPERGLYCEMIA: ICD-10-CM

## 2023-07-13 DIAGNOSIS — T84.59XD INFECTION OF PROSTHETIC KNEE JOINT, SUBSEQUENT ENCOUNTER: ICD-10-CM

## 2023-07-13 DIAGNOSIS — D64.9 ANEMIA, UNSPECIFIED TYPE: ICD-10-CM

## 2023-07-13 DIAGNOSIS — E55.9 VITAMIN D DEFICIENCY: ICD-10-CM

## 2023-07-13 LAB
25(OH)D3 SERPL-MCNC: 52.4 NG/ML
ALBUMIN SERPL-MCNC: 4.1 G/DL (ref 3.5–5.2)
ALBUMIN/GLOB SERPL: 1.2 {RATIO} (ref 1–2.5)
ALP SERPL-CCNC: 131 U/L (ref 35–104)
ALT SERPL-CCNC: 26 U/L (ref 5–33)
ANION GAP SERPL CALCULATED.3IONS-SCNC: 15 MMOL/L (ref 9–17)
AST SERPL-CCNC: 40 U/L
BILIRUB SERPL-MCNC: 0.5 MG/DL (ref 0.3–1.2)
BUN SERPL-MCNC: 9 MG/DL (ref 8–23)
CALCIUM SERPL-MCNC: 9.4 MG/DL (ref 8.6–10.4)
CHLORIDE SERPL-SCNC: 101 MMOL/L (ref 98–107)
CHOLEST SERPL-MCNC: 153 MG/DL
CHOLESTEROL/HDL RATIO: 2.6
CO2 SERPL-SCNC: 25 MMOL/L (ref 20–31)
CREAT SERPL-MCNC: 0.9 MG/DL (ref 0.5–0.9)
GFR SERPL CREATININE-BSD FRML MDRD: >60 ML/MIN/1.73M2
GLUCOSE SERPL-MCNC: 81 MG/DL (ref 70–99)
HDLC SERPL-MCNC: 59 MG/DL
IRON SERPL-MCNC: 29 UG/DL (ref 37–145)
LDLC SERPL CALC-MCNC: 87 MG/DL (ref 0–130)
POTASSIUM SERPL-SCNC: 4.3 MMOL/L (ref 3.7–5.3)
PROT SERPL-MCNC: 7.5 G/DL (ref 6.4–8.3)
SODIUM SERPL-SCNC: 141 MMOL/L (ref 135–144)
T4 FREE SERPL-MCNC: 0.9 NG/DL (ref 0.9–1.7)
TRIGL SERPL-MCNC: 34 MG/DL
TSH SERPL DL<=0.05 MIU/L-ACNC: 24.27 UIU/ML (ref 0.3–5)

## 2023-07-14 LAB
CRP SERPL HS-MCNC: <3 MG/L (ref 0–5)
EST. AVERAGE GLUCOSE BLD GHB EST-MCNC: 80 MG/DL
HBA1C MFR BLD: 4.4 % (ref 4–6)

## 2023-07-18 ENCOUNTER — TELEPHONE (OUTPATIENT)
Dept: ORTHOPEDIC SURGERY | Age: 68
End: 2023-07-18

## 2023-07-18 DIAGNOSIS — Z01.818 PRE-OP TESTING: Primary | ICD-10-CM

## 2023-07-24 ENCOUNTER — HOSPITAL ENCOUNTER (OUTPATIENT)
Dept: GENERAL RADIOLOGY | Age: 68
Discharge: HOME | End: 2023-07-26
Payer: MEDICARE

## 2023-07-24 ENCOUNTER — HOSPITAL ENCOUNTER (OUTPATIENT)
Dept: PREADMISSION TESTING | Age: 68
Discharge: HOME | End: 2023-07-28
Payer: MEDICARE

## 2023-07-24 VITALS
SYSTOLIC BLOOD PRESSURE: 141 MMHG | RESPIRATION RATE: 20 BRPM | BODY MASS INDEX: 32.14 KG/M2 | TEMPERATURE: 97.1 F | WEIGHT: 224.5 LBS | HEIGHT: 70 IN | HEART RATE: 76 BPM | DIASTOLIC BLOOD PRESSURE: 64 MMHG

## 2023-07-24 DIAGNOSIS — Z01.818 PRE-OP TESTING: ICD-10-CM

## 2023-07-24 LAB
ABO + RH BLD: NORMAL
ALBUMIN SERPL-MCNC: 3.9 G/DL (ref 3.5–5.2)
ALP SERPL-CCNC: 156 U/L (ref 35–104)
ALT SERPL-CCNC: 49 U/L (ref 5–33)
ANION GAP SERPL CALCULATED.3IONS-SCNC: 8 MMOL/L (ref 9–17)
ARM BAND NUMBER: NORMAL
AST SERPL-CCNC: 49 U/L
BILIRUB SERPL-MCNC: 0.3 MG/DL (ref 0.3–1.2)
BILIRUB UR QL STRIP: NEGATIVE
BLOOD BANK SAMPLE EXPIRATION: NORMAL
BLOOD GROUP ANTIBODIES SERPL: NEGATIVE
BUN SERPL-MCNC: 14 MG/DL (ref 8–23)
BUN/CREAT SERPL: 18 (ref 9–20)
CALCIUM SERPL-MCNC: 8.9 MG/DL (ref 8.6–10.4)
CHLORIDE SERPL-SCNC: 100 MMOL/L (ref 98–107)
CLARITY UR: CLEAR
CO2 SERPL-SCNC: 31 MMOL/L (ref 20–31)
COLOR UR: YELLOW
COMMENT: NORMAL
CREAT SERPL-MCNC: 0.8 MG/DL (ref 0.5–0.9)
ERYTHROCYTE [DISTWIDTH] IN BLOOD BY AUTOMATED COUNT: 17.9 % (ref 11.8–14.4)
GFR SERPL CREATININE-BSD FRML MDRD: >60 ML/MIN/1.73M2
GLUCOSE SERPL-MCNC: 105 MG/DL (ref 70–99)
GLUCOSE UR STRIP-MCNC: NEGATIVE MG/DL
HCT VFR BLD AUTO: 38.1 % (ref 36.3–47.1)
HGB BLD-MCNC: 10.9 G/DL (ref 11.9–15.1)
HGB UR QL STRIP.AUTO: NEGATIVE
KETONES UR STRIP-MCNC: NEGATIVE MG/DL
LEUKOCYTE ESTERASE UR QL STRIP: NEGATIVE
MCH RBC QN AUTO: 25.4 PG (ref 25.2–33.5)
MCHC RBC AUTO-ENTMCNC: 28.6 G/DL (ref 28.4–34.8)
MCV RBC AUTO: 88.8 FL (ref 82.6–102.9)
NITRITE UR QL STRIP: NEGATIVE
NRBC BLD-RTO: 0 PER 100 WBC
PH UR STRIP: 5.5 [PH] (ref 5–8)
PLATELET # BLD AUTO: 143 K/UL (ref 138–453)
PMV BLD AUTO: 12.3 FL (ref 8.1–13.5)
POTASSIUM SERPL-SCNC: 4.4 MMOL/L (ref 3.7–5.3)
PROT SERPL-MCNC: 7.2 G/DL (ref 6.4–8.3)
PROT UR STRIP-MCNC: NEGATIVE MG/DL
RBC # BLD AUTO: 4.29 M/UL (ref 3.95–5.11)
SODIUM SERPL-SCNC: 139 MMOL/L (ref 135–144)
SP GR UR STRIP: 1.02 (ref 1–1.03)
UROBILINOGEN UR STRIP-ACNC: NORMAL EU/DL (ref 0–1)
WBC OTHER # BLD: 3.5 K/UL (ref 3.5–11.3)

## 2023-07-24 PROCEDURE — 87641 MR-STAPH DNA AMP PROBE: CPT

## 2023-07-24 PROCEDURE — 86850 RBC ANTIBODY SCREEN: CPT

## 2023-07-24 PROCEDURE — 93005 ELECTROCARDIOGRAM TRACING: CPT

## 2023-07-24 PROCEDURE — 81003 URINALYSIS AUTO W/O SCOPE: CPT

## 2023-07-24 PROCEDURE — 86901 BLOOD TYPING SEROLOGIC RH(D): CPT

## 2023-07-24 PROCEDURE — 86900 BLOOD TYPING SEROLOGIC ABO: CPT

## 2023-07-24 PROCEDURE — 83036 HEMOGLOBIN GLYCOSYLATED A1C: CPT

## 2023-07-24 PROCEDURE — 85027 COMPLETE CBC AUTOMATED: CPT

## 2023-07-24 PROCEDURE — 80053 COMPREHEN METABOLIC PANEL: CPT

## 2023-07-24 PROCEDURE — 71046 X-RAY EXAM CHEST 2 VIEWS: CPT

## 2023-07-24 PROCEDURE — 36415 COLL VENOUS BLD VENIPUNCTURE: CPT

## 2023-07-24 RX ORDER — CELECOXIB 200 MG/1
200 CAPSULE ORAL ONCE
OUTPATIENT
Start: 2023-08-07

## 2023-07-24 RX ORDER — ACETAMINOPHEN 500 MG
1000 TABLET ORAL ONCE
OUTPATIENT
Start: 2023-08-07

## 2023-07-24 ASSESSMENT — KOOS JR
GOING UP OR DOWN STAIRS: 1
BENDING TO THE FLOOR TO PICK UP OBJECT: 2
KOOS JR TOTAL INTERVAL SCORE: 63.776
STRAIGHTENING KNEE FULLY: 1
RISING FROM SITTING: 1
HOW SEVERE IS YOUR KNEE STIFFNESS AFTER FIRST WAKING IN MORNING: 2
TWISING OR PIVOTING ON KNEE: 2
STANDING UPRIGHT: 0

## 2023-07-24 ASSESSMENT — PROMIS GLOBAL HEALTH SCALE
IN GENERAL, HOW WOULD YOU RATE YOUR PHYSICAL HEALTH [ON A SCALE OF 1 (POOR) TO 5 (EXCELLENT)]?: 3
IN THE PAST 7 DAYS, HOW WOULD YOU RATE YOUR FATIGUE ON AVERAGE [ON A SCALE FROM 1 (NONE) TO 5 (VERY SEVERE)]?: 4
SUM OF RESPONSES TO QUESTIONS 2, 4, 5, & 10: 15
IN GENERAL, PLEASE RATE HOW WELL YOU CARRY OUT YOUR USUAL SOCIAL ACTIVITIES (INCLUDES ACTIVITIES AT HOME, AT WORK, AND IN YOUR COMMUNITY, AND RESPONSIBILITIES AS A PARENT, CHILD, SPOUSE, EMPLOYEE, FRIEND, ETC) [ON A SCALE OF 1 (POOR) TO 5 (EXCELLENT)]?: 3
IN THE PAST 7 DAYS, HOW OFTEN HAVE YOU BEEN BOTHERED BY EMOTIONAL PROBLEMS, SUCH AS FEELING ANXIOUS, DEPRESSED, OR IRRITABLE [ON A SCALE FROM 1 (NEVER) TO 5 (ALWAYS)]?: 5
IN GENERAL, HOW WOULD YOU RATE YOUR MENTAL HEALTH, INCLUDING YOUR MOOD AND YOUR ABILITY TO THINK [ON A SCALE OF 1 (POOR) TO 5 (EXCELLENT)]?: 3
IN THE PAST 7 DAYS, HOW WOULD YOU RATE YOUR PAIN ON AVERAGE [ON A SCALE FROM 0 (NO PAIN) TO 10 (WORST IMAGINABLE PAIN)]?: 3
IN GENERAL, HOW WOULD YOU RATE YOUR SATISFACTION WITH YOUR SOCIAL ACTIVITIES AND RELATIONSHIPS [ON A SCALE OF 1 (POOR) TO 5 (EXCELLENT)]?: 3
SUM OF RESPONSES TO QUESTIONS 3, 6, 7, & 8: 12
IN GENERAL, WOULD YOU SAY YOUR QUALITY OF LIFE IS...[ON A SCALE OF 1 (POOR) TO 5 (EXCELLENT)]: 4
HOW IS THE PROMIS V1.1 BEING ADMINISTERED?: 0
WHO IS THE PERSON COMPLETING THE PROMIS V1.1 SURVEY?: 0
TO WHAT EXTENT ARE YOU ABLE TO CARRY OUT YOUR EVERYDAY PHYSICAL ACTIVITIES SUCH AS WALKING, CLIMBING STAIRS, CARRYING GROCERIES, OR MOVING A CHAIR [ON A SCALE OF 1 (NOT AT ALL) TO 5 (COMPLETELY)]?: 2
IN GENERAL, WOULD YOU SAY YOUR HEALTH IS...[ON A SCALE OF 1 (POOR) TO 5 (EXCELLENT)]: 4

## 2023-07-24 NOTE — PRE-PROCEDURE INSTRUCTIONS
receive anesthesia or medication. If you do not have someone to stay with you, your procedure may be cancelled. [facility] can expect quality medical and nursing care that is centered on you individual needs. Our goal is to make your surgical experience as comfortable as possible.     Any questions about preparing for your surgery please call [phone].      ____________________________   ____________________________  Signature (Patient)                                 Signature (Nurse)                     Date

## 2023-07-25 LAB
EST. AVERAGE GLUCOSE BLD GHB EST-MCNC: 82 MG/DL
HBA1C MFR BLD: 4.5 % (ref 4–6)
MRSA, DNA, NASAL: NEGATIVE
SPECIMEN DESCRIPTION: NORMAL

## 2023-07-26 LAB
EKG ATRIAL RATE: 71 BPM
EKG P AXIS: 52 DEGREES
EKG P-R INTERVAL: 190 MS
EKG Q-T INTERVAL: 370 MS
EKG QRS DURATION: 66 MS
EKG QTC CALCULATION (BAZETT): 402 MS
EKG R AXIS: 6 DEGREES
EKG T AXIS: 14 DEGREES
EKG VENTRICULAR RATE: 71 BPM

## 2023-07-26 PROCEDURE — 93010 ELECTROCARDIOGRAM REPORT: CPT | Performed by: INTERNAL MEDICINE

## 2023-08-06 NOTE — PLAN OF CARE
PROTOCOLS  NURSING IMPLEMENTED    TOTAL JOINT DVT/PE  VENOUS THROMBOEMBOLISM PROPHYLAXIS  (Nursing Automatically Implement)    Suki Narvaez  [id number]  [unfilled]  8/6/23    YES DVT RISK FACTOR SCORE YES MAJOR BLEEDING RISK FACTORS SCORE     [x] 48years old or greater (1)   [] Hx. Easy Bleeding (1)      [] Heart failure (2)   [] NSAID Use in Last 5 Days (2)      [] Varicose veins - Hx. (1)   [] Gastrointestinal or Genitourinary bleeding in Last 14 Days (2)      [] Myocardial Infarction - Hx. (1)         [] Cancer - Hx. (2)         [] Atrial fibrillation - Hx. (1)         [] Ischemic Stroke - Hx. (1)         [] Diabetes Mellitus - Hx. (1)         [] Previous DVT/PE - Hx.  (2)         [] Hormone Replacement Therapy (1)         [x] Obesity (1)         [] Paralysis (1)         [] Pregnancy (1)         [] Smoking (1)                   [] Thromophilia (1)   []   Mild to Moderate Bleeding (2)      [] Total Hip Arthroplasty (1)   [] Active Bleeding (4)      [] Family history of PE or DVT? (4) (Consider the following labs to test for presence of inhibitor deficiency state:) Factor V Leiden, Prothrombin Gene Mutation, Protein S Deficiency, Protien C Deficiency, Antithrombin Deficiency   [] Malignant Hypertension (2)        [] Thrombocytopenia 20k to 100k (2)        [] Thrombocytopenia less than 20k (4)        [] Bleeding Diathesis (4)        [] \"Bloody Stick\" Epidural or Spinal (2)     TOTAL DVT SCORE 2  TOTAL BLEEDING SCORE 0     [x] CLASS A   Standard Risk DVT (0-3)    [x] CLASS X Standard Risk Bleeding (0-4)      [] CLASS B Elevated Risk DVT (greater than 3)    [] CLASS Y High Risk Bleeding (greater than 4)     FINAL MATRIX (e.g. AY)       *If allergic to ASA use Warfarin  *BY patient consider no treatment  **Consider venous filter with high risk PE  **If on Coumadin pre-op, then restart night of surgery      [x]  DVT Prophylaxis: Class AX, AY    Ecotrin 81 mg by mouth BID starting day of surgery for 6 weeks for all total joints. (If allergic to aspirin, give eliquis 2.5mg BID X 14 days (knees) or 35 days  (hips) starting first day postop at 0600). []  DVT Prophylaxis:  Class BX (willian choice)    [x]Eliquis 2.5mg BID x 14 days (knees) or 35 days (hips) starting first day postop      at 0600      [] Lovenox 40 mg subcu daily starting first day postop at 0600 x 14 days                             (knees) or 35 days (hips)    Ecotrin 81 mg PO BID-start when Lovenox is finished. (Teach injection prior to discharge.)       [] Xarelto 10 mg by mouth daily starting first day postop at 0600     14 days (knees) or 35 days (hips). If creatinine clearance less than 30 mL/min, give Lovenox 30 mg subcu   Daily starting first day postop at 0600. Ecotrin 81 mg PO BID-starting   when Lovenox is finished. (Teach injection prior to      discharge.)  (For discharge fill throughout the 10 mg daily with no    refills.)      []  DVT Prophylaxis:  Class BY (willian choice)               []  Eliquis 2.5mg BID x 14 days (knees) or 35 days (hips) starting first day                              postop at 0600        []  Ecotrin 81 mg PO BID x 6weeks (all joints)      []  Lovenox 40mg SQ daily to start at 0600 first day post-Op day. 14 days for knees, 35 days for hips    Ecotrin 81 mg PO BID - start when Lovenox is finished. (Teach injection prior to discharge.)       [] Xarelto 10 mg PO daily starting first day Post-Op at 0600    14 days (knees) or 35 days (hips)    If Creatinine Clearance less than 30ml/min, give Lovenox 30 mg    SQ daily starting first day Post-Op at 0600 x 14 days (knees) or 35   days (hips). Ecotrin 81 mg PO BID - start when Lovenox is finished.     (Teach injection prior to discharge.)  (For discharge fill throughout the   10 mg daily #32 with no refills.)      Electronically signed by Jenny Valero MD on 8/6/2023 at 5:44 PM

## 2023-08-07 ENCOUNTER — HOSPITAL ENCOUNTER (INPATIENT)
Age: 68
LOS: 1 days | Discharge: HOME | DRG: 467 | End: 2023-08-08
Attending: ORTHOPAEDIC SURGERY | Admitting: ORTHOPAEDIC SURGERY
Payer: MEDICARE

## 2023-08-07 ENCOUNTER — APPOINTMENT (OUTPATIENT)
Dept: GENERAL RADIOLOGY | Age: 68
DRG: 467 | End: 2023-08-07
Attending: ORTHOPAEDIC SURGERY
Payer: MEDICARE

## 2023-08-07 ENCOUNTER — ANESTHESIA (OUTPATIENT)
Dept: OPERATING ROOM | Age: 68
DRG: 467 | End: 2023-08-07
Payer: MEDICARE

## 2023-08-07 ENCOUNTER — ANESTHESIA EVENT (OUTPATIENT)
Dept: OPERATING ROOM | Age: 68
DRG: 467 | End: 2023-08-07
Payer: MEDICARE

## 2023-08-07 DIAGNOSIS — T84.59XD INFECTED PROSTHETIC KNEE JOINT, SUBSEQUENT ENCOUNTER: ICD-10-CM

## 2023-08-07 DIAGNOSIS — G89.18 POST-OP PAIN: Primary | ICD-10-CM

## 2023-08-07 DIAGNOSIS — Z96.659 INFECTED PROSTHETIC KNEE JOINT, SUBSEQUENT ENCOUNTER: ICD-10-CM

## 2023-08-07 LAB
HCT VFR BLD AUTO: 30 % (ref 36.3–47.1)
HGB BLD-MCNC: 8.7 G/DL (ref 11.9–15.1)

## 2023-08-07 PROCEDURE — 2580000003 HC RX 258: Performed by: ORTHOPAEDIC SURGERY

## 2023-08-07 PROCEDURE — 2709999900 HC NON-CHARGEABLE SUPPLY: Performed by: ORTHOPAEDIC SURGERY

## 2023-08-07 PROCEDURE — 87205 SMEAR GRAM STAIN: CPT

## 2023-08-07 PROCEDURE — 2500000003 HC RX 250 WO HCPCS: Performed by: SPECIALIST

## 2023-08-07 PROCEDURE — 3600000015 HC SURGERY LEVEL 5 ADDTL 15MIN: Performed by: ORTHOPAEDIC SURGERY

## 2023-08-07 PROCEDURE — 2500000003 HC RX 250 WO HCPCS: Performed by: NURSE ANESTHETIST, CERTIFIED REGISTERED

## 2023-08-07 PROCEDURE — 0SPD0JZ REMOVAL OF SYNTHETIC SUBSTITUTE FROM LEFT KNEE JOINT, OPEN APPROACH: ICD-10-PCS | Performed by: ORTHOPAEDIC SURGERY

## 2023-08-07 PROCEDURE — 6360000002 HC RX W HCPCS: Performed by: ORTHOPAEDIC SURGERY

## 2023-08-07 PROCEDURE — 7100000000 HC PACU RECOVERY - FIRST 15 MIN: Performed by: ORTHOPAEDIC SURGERY

## 2023-08-07 PROCEDURE — 6370000000 HC RX 637 (ALT 250 FOR IP)

## 2023-08-07 PROCEDURE — 88331 PATH CONSLTJ SURG 1 BLK 1SPC: CPT

## 2023-08-07 PROCEDURE — 7100000001 HC PACU RECOVERY - ADDTL 15 MIN: Performed by: ORTHOPAEDIC SURGERY

## 2023-08-07 PROCEDURE — 6360000002 HC RX W HCPCS: Performed by: ANESTHESIOLOGY

## 2023-08-07 PROCEDURE — 3E0R3BZ INTRODUCTION OF ANESTHETIC AGENT INTO SPINAL CANAL, PERCUTANEOUS APPROACH: ICD-10-PCS | Performed by: ANESTHESIOLOGY

## 2023-08-07 PROCEDURE — 27487 REVISE/REPLACE KNEE JOINT: CPT | Performed by: ORTHOPAEDIC SURGERY

## 2023-08-07 PROCEDURE — C1713 ANCHOR/SCREW BN/BN,TIS/BN: HCPCS | Performed by: ORTHOPAEDIC SURGERY

## 2023-08-07 PROCEDURE — 87075 CULTR BACTERIA EXCEPT BLOOD: CPT

## 2023-08-07 PROCEDURE — P9041 ALBUMIN (HUMAN),5%, 50ML: HCPCS | Performed by: NURSE ANESTHETIST, CERTIFIED REGISTERED

## 2023-08-07 PROCEDURE — 3700000001 HC ADD 15 MINUTES (ANESTHESIA): Performed by: ORTHOPAEDIC SURGERY

## 2023-08-07 PROCEDURE — 85014 HEMATOCRIT: CPT

## 2023-08-07 PROCEDURE — C1776 JOINT DEVICE (IMPLANTABLE): HCPCS | Performed by: ORTHOPAEDIC SURGERY

## 2023-08-07 PROCEDURE — 2580000003 HC RX 258: Performed by: ANESTHESIOLOGY

## 2023-08-07 PROCEDURE — 6360000002 HC RX W HCPCS: Performed by: SPECIALIST

## 2023-08-07 PROCEDURE — 0SRD069 REPLACEMENT OF LEFT KNEE JOINT WITH OXIDIZED ZIRCONIUM ON POLYETHYLENE SYNTHETIC SUBSTITUTE, CEMENTED, OPEN APPROACH: ICD-10-PCS | Performed by: ORTHOPAEDIC SURGERY

## 2023-08-07 PROCEDURE — 6360000002 HC RX W HCPCS: Performed by: NURSE ANESTHETIST, CERTIFIED REGISTERED

## 2023-08-07 PROCEDURE — 2580000003 HC RX 258

## 2023-08-07 PROCEDURE — 3700000000 HC ANESTHESIA ATTENDED CARE: Performed by: ORTHOPAEDIC SURGERY

## 2023-08-07 PROCEDURE — 2720000010 HC SURG SUPPLY STERILE: Performed by: ORTHOPAEDIC SURGERY

## 2023-08-07 PROCEDURE — 87070 CULTURE OTHR SPECIMN AEROBIC: CPT

## 2023-08-07 PROCEDURE — 73560 X-RAY EXAM OF KNEE 1 OR 2: CPT

## 2023-08-07 PROCEDURE — 36415 COLL VENOUS BLD VENIPUNCTURE: CPT

## 2023-08-07 PROCEDURE — 85018 HEMOGLOBIN: CPT

## 2023-08-07 PROCEDURE — 6370000000 HC RX 637 (ALT 250 FOR IP): Performed by: ANESTHESIOLOGY

## 2023-08-07 PROCEDURE — 88305 TISSUE EXAM BY PATHOLOGIST: CPT

## 2023-08-07 PROCEDURE — 87176 TISSUE HOMOGENIZATION CULTR: CPT

## 2023-08-07 PROCEDURE — 3600000005 HC SURGERY LEVEL 5 BASE: Performed by: ORTHOPAEDIC SURGERY

## 2023-08-07 DEVICE — IMPLANTABLE DEVICE: Type: IMPLANTABLE DEVICE | Site: KNEE | Status: FUNCTIONAL

## 2023-08-07 DEVICE — PSN REV TM TIB CENTRAL CONE SZ SM: Type: IMPLANTABLE DEVICE | Site: KNEE | Status: FUNCTIONAL

## 2023-08-07 DEVICE — COMPONENT PAT DIA38MM THK9.5MM KNEE POLY CEM CONVENTIONAL: Type: IMPLANTABLE DEVICE | Site: KNEE | Status: FUNCTIONAL

## 2023-08-07 RX ORDER — OXYCODONE HYDROCHLORIDE 5 MG/1
5 TABLET ORAL EVERY 4 HOURS PRN
Status: DISCONTINUED | OUTPATIENT
Start: 2023-08-07 | End: 2023-08-08 | Stop reason: HOSPADM

## 2023-08-07 RX ORDER — ASPIRIN 81 MG/1
81 TABLET ORAL 2 TIMES DAILY
Qty: 84 TABLET | Refills: 0 | Status: SHIPPED | OUTPATIENT
Start: 2023-08-07

## 2023-08-07 RX ORDER — GABAPENTIN 300 MG/1
300 CAPSULE ORAL 3 TIMES DAILY
Status: DISCONTINUED | OUTPATIENT
Start: 2023-08-07 | End: 2023-08-08 | Stop reason: HOSPADM

## 2023-08-07 RX ORDER — SODIUM CHLORIDE 9 MG/ML
INJECTION, SOLUTION INTRAVENOUS CONTINUOUS
Status: DISCONTINUED | OUTPATIENT
Start: 2023-08-07 | End: 2023-08-08 | Stop reason: HOSPADM

## 2023-08-07 RX ORDER — NAPROXEN 250 MG/1
250 TABLET ORAL ONCE
Status: DISCONTINUED | OUTPATIENT
Start: 2023-08-07 | End: 2023-08-07

## 2023-08-07 RX ORDER — OXYCODONE HYDROCHLORIDE AND ACETAMINOPHEN 5; 325 MG/1; MG/1
1-2 TABLET ORAL EVERY 6 HOURS PRN
Qty: 56 TABLET | Refills: 0 | Status: SHIPPED | OUTPATIENT
Start: 2023-08-07 | End: 2023-08-14

## 2023-08-07 RX ORDER — KETAMINE HCL IN NACL, ISO-OSM 100MG/10ML
SYRINGE (ML) INJECTION PRN
Status: DISCONTINUED | OUTPATIENT
Start: 2023-08-07 | End: 2023-08-07 | Stop reason: SDUPTHER

## 2023-08-07 RX ORDER — OXYCODONE HYDROCHLORIDE 5 MG/1
5 TABLET ORAL
Status: DISCONTINUED | OUTPATIENT
Start: 2023-08-07 | End: 2023-08-07 | Stop reason: HOSPADM

## 2023-08-07 RX ORDER — FENTANYL CITRATE 50 UG/ML
50 INJECTION, SOLUTION INTRAMUSCULAR; INTRAVENOUS EVERY 5 MIN PRN
Status: DISCONTINUED | OUTPATIENT
Start: 2023-08-07 | End: 2023-08-07 | Stop reason: HOSPADM

## 2023-08-07 RX ORDER — SERTRALINE HYDROCHLORIDE 25 MG/1
25 TABLET, FILM COATED ORAL DAILY
Status: DISCONTINUED | OUTPATIENT
Start: 2023-08-08 | End: 2023-08-08 | Stop reason: HOSPADM

## 2023-08-07 RX ORDER — MIDAZOLAM HYDROCHLORIDE 1 MG/ML
INJECTION INTRAMUSCULAR; INTRAVENOUS PRN
Status: DISCONTINUED | OUTPATIENT
Start: 2023-08-07 | End: 2023-08-07 | Stop reason: SDUPTHER

## 2023-08-07 RX ORDER — BUPIVACAINE HYDROCHLORIDE 7.5 MG/ML
INJECTION, SOLUTION INTRASPINAL PRN
Status: DISCONTINUED | OUTPATIENT
Start: 2023-08-07 | End: 2023-08-07 | Stop reason: SDUPTHER

## 2023-08-07 RX ORDER — ACETAMINOPHEN 500 MG
1000 TABLET ORAL ONCE
Status: COMPLETED | OUTPATIENT
Start: 2023-08-07 | End: 2023-08-07

## 2023-08-07 RX ORDER — GABAPENTIN 300 MG/1
600 CAPSULE ORAL ONCE
Status: DISCONTINUED | OUTPATIENT
Start: 2023-08-07 | End: 2023-08-07

## 2023-08-07 RX ORDER — CELECOXIB 200 MG/1
200 CAPSULE ORAL ONCE
Status: DISCONTINUED | OUTPATIENT
Start: 2023-08-07 | End: 2023-08-07

## 2023-08-07 RX ORDER — DOCUSATE SODIUM 100 MG/1
100 CAPSULE, LIQUID FILLED ORAL 2 TIMES DAILY PRN
Qty: 20 CAPSULE | Refills: 0 | Status: SHIPPED | OUTPATIENT
Start: 2023-08-07

## 2023-08-07 RX ORDER — PROMETHAZINE HYDROCHLORIDE 12.5 MG/1
12.5 TABLET ORAL EVERY 6 HOURS PRN
Status: DISCONTINUED | OUTPATIENT
Start: 2023-08-07 | End: 2023-08-08 | Stop reason: HOSPADM

## 2023-08-07 RX ORDER — MAGNESIUM HYDROXIDE 1200 MG/15ML
LIQUID ORAL CONTINUOUS PRN
Status: COMPLETED | OUTPATIENT
Start: 2023-08-07 | End: 2023-08-07

## 2023-08-07 RX ORDER — OXYCODONE HYDROCHLORIDE 5 MG/1
10 TABLET ORAL EVERY 4 HOURS PRN
Status: DISCONTINUED | OUTPATIENT
Start: 2023-08-07 | End: 2023-08-08 | Stop reason: HOSPADM

## 2023-08-07 RX ORDER — SODIUM CHLORIDE 0.9 % (FLUSH) 0.9 %
5-40 SYRINGE (ML) INJECTION EVERY 12 HOURS SCHEDULED
Status: DISCONTINUED | OUTPATIENT
Start: 2023-08-07 | End: 2023-08-08 | Stop reason: HOSPADM

## 2023-08-07 RX ORDER — TRANEXAMIC ACID 100 MG/ML
INJECTION, SOLUTION INTRAVENOUS
Status: COMPLETED
Start: 2023-08-07 | End: 2023-08-07

## 2023-08-07 RX ORDER — SODIUM CHLORIDE, SODIUM LACTATE, POTASSIUM CHLORIDE, CALCIUM CHLORIDE 600; 310; 30; 20 MG/100ML; MG/100ML; MG/100ML; MG/100ML
INJECTION, SOLUTION INTRAVENOUS CONTINUOUS
Status: DISCONTINUED | OUTPATIENT
Start: 2023-08-07 | End: 2023-08-07

## 2023-08-07 RX ORDER — DIPHENHYDRAMINE HYDROCHLORIDE 50 MG/ML
12.5 INJECTION INTRAMUSCULAR; INTRAVENOUS
Status: DISCONTINUED | OUTPATIENT
Start: 2023-08-07 | End: 2023-08-07 | Stop reason: HOSPADM

## 2023-08-07 RX ORDER — FENTANYL CITRATE 50 UG/ML
INJECTION, SOLUTION INTRAMUSCULAR; INTRAVENOUS PRN
Status: DISCONTINUED | OUTPATIENT
Start: 2023-08-07 | End: 2023-08-07 | Stop reason: SDUPTHER

## 2023-08-07 RX ORDER — ONDANSETRON 2 MG/ML
4 INJECTION INTRAMUSCULAR; INTRAVENOUS EVERY 6 HOURS PRN
Status: DISCONTINUED | OUTPATIENT
Start: 2023-08-07 | End: 2023-08-08 | Stop reason: HOSPADM

## 2023-08-07 RX ORDER — ALBUMIN, HUMAN INJ 5% 5 %
SOLUTION INTRAVENOUS
Status: COMPLETED
Start: 2023-08-07 | End: 2023-08-07

## 2023-08-07 RX ORDER — BISACODYL 5 MG/1
5 TABLET, DELAYED RELEASE ORAL DAILY
Status: DISCONTINUED | OUTPATIENT
Start: 2023-08-07 | End: 2023-08-08 | Stop reason: HOSPADM

## 2023-08-07 RX ORDER — ACETAMINOPHEN 325 MG/1
650 TABLET ORAL EVERY 6 HOURS
Status: DISCONTINUED | OUTPATIENT
Start: 2023-08-07 | End: 2023-08-08 | Stop reason: HOSPADM

## 2023-08-07 RX ORDER — FENTANYL CITRATE 50 UG/ML
25 INJECTION, SOLUTION INTRAMUSCULAR; INTRAVENOUS EVERY 5 MIN PRN
Status: DISCONTINUED | OUTPATIENT
Start: 2023-08-07 | End: 2023-08-07 | Stop reason: HOSPADM

## 2023-08-07 RX ORDER — ALBUMIN, HUMAN INJ 5% 5 %
SOLUTION INTRAVENOUS PRN
Status: DISCONTINUED | OUTPATIENT
Start: 2023-08-07 | End: 2023-08-07 | Stop reason: SDUPTHER

## 2023-08-07 RX ORDER — BUSPIRONE HYDROCHLORIDE 10 MG/1
20 TABLET ORAL 2 TIMES DAILY
Status: DISCONTINUED | OUTPATIENT
Start: 2023-08-07 | End: 2023-08-08 | Stop reason: HOSPADM

## 2023-08-07 RX ORDER — ACETAMINOPHEN 500 MG
1000 TABLET ORAL ONCE
Status: DISCONTINUED | OUTPATIENT
Start: 2023-08-07 | End: 2023-08-07

## 2023-08-07 RX ORDER — SODIUM CHLORIDE 9 MG/ML
INJECTION, SOLUTION INTRAVENOUS PRN
Status: DISCONTINUED | OUTPATIENT
Start: 2023-08-07 | End: 2023-08-08 | Stop reason: HOSPADM

## 2023-08-07 RX ORDER — SODIUM CHLORIDE 0.9 % (FLUSH) 0.9 %
5-40 SYRINGE (ML) INJECTION PRN
Status: DISCONTINUED | OUTPATIENT
Start: 2023-08-07 | End: 2023-08-08 | Stop reason: HOSPADM

## 2023-08-07 RX ORDER — VANCOMYCIN HYDROCHLORIDE 1 G/20ML
INJECTION, POWDER, LYOPHILIZED, FOR SOLUTION INTRAVENOUS PRN
Status: DISCONTINUED | OUTPATIENT
Start: 2023-08-07 | End: 2023-08-07 | Stop reason: ALTCHOICE

## 2023-08-07 RX ORDER — POLYETHYLENE GLYCOL 3350 17 G/17G
17 POWDER, FOR SOLUTION ORAL DAILY
Status: DISCONTINUED | OUTPATIENT
Start: 2023-08-07 | End: 2023-08-08 | Stop reason: HOSPADM

## 2023-08-07 RX ORDER — TRANEXAMIC ACID 100 MG/ML
INJECTION, SOLUTION INTRAVENOUS PRN
Status: DISCONTINUED | OUTPATIENT
Start: 2023-08-07 | End: 2023-08-07 | Stop reason: SDUPTHER

## 2023-08-07 RX ORDER — PROPOFOL 10 MG/ML
INJECTION, EMULSION INTRAVENOUS CONTINUOUS PRN
Status: DISCONTINUED | OUTPATIENT
Start: 2023-08-07 | End: 2023-08-07 | Stop reason: SDUPTHER

## 2023-08-07 RX ORDER — ONDANSETRON 2 MG/ML
4 INJECTION INTRAMUSCULAR; INTRAVENOUS
Status: DISCONTINUED | OUTPATIENT
Start: 2023-08-07 | End: 2023-08-07 | Stop reason: HOSPADM

## 2023-08-07 RX ORDER — EPHEDRINE SULFATE/0.9% NACL/PF 50 MG/5 ML
SYRINGE (ML) INTRAVENOUS PRN
Status: DISCONTINUED | OUTPATIENT
Start: 2023-08-07 | End: 2023-08-07 | Stop reason: SDUPTHER

## 2023-08-07 RX ORDER — PROPOFOL 10 MG/ML
INJECTION, EMULSION INTRAVENOUS
Status: COMPLETED
Start: 2023-08-07 | End: 2023-08-07

## 2023-08-07 RX ORDER — TRAMADOL HYDROCHLORIDE 50 MG/1
50 TABLET ORAL ONCE
Status: DISCONTINUED | OUTPATIENT
Start: 2023-08-07 | End: 2023-08-08 | Stop reason: HOSPADM

## 2023-08-07 RX ORDER — LEVOTHYROXINE SODIUM 112 UG/1
112 TABLET ORAL DAILY
Status: DISCONTINUED | OUTPATIENT
Start: 2023-08-08 | End: 2023-08-08 | Stop reason: HOSPADM

## 2023-08-07 RX ORDER — PHENYLEPHRINE HCL IN 0.9% NACL 1 MG/10 ML
SYRINGE (ML) INTRAVENOUS PRN
Status: DISCONTINUED | OUTPATIENT
Start: 2023-08-07 | End: 2023-08-07 | Stop reason: SDUPTHER

## 2023-08-07 RX ORDER — ASPIRIN 81 MG/1
81 TABLET ORAL 2 TIMES DAILY
Status: DISCONTINUED | OUTPATIENT
Start: 2023-08-08 | End: 2023-08-08 | Stop reason: HOSPADM

## 2023-08-07 RX ORDER — DEXAMETHASONE SODIUM PHOSPHATE 10 MG/ML
10 INJECTION, SOLUTION INTRAMUSCULAR; INTRAVENOUS ONCE
Status: DISCONTINUED | OUTPATIENT
Start: 2023-08-07 | End: 2023-08-07 | Stop reason: HOSPADM

## 2023-08-07 RX ADMIN — Medication 2000 MG: at 13:29

## 2023-08-07 RX ADMIN — Medication 5 MG: at 15:23

## 2023-08-07 RX ADMIN — Medication 100 MCG: at 15:07

## 2023-08-07 RX ADMIN — Medication 100 MCG: at 15:15

## 2023-08-07 RX ADMIN — BUPIVACAINE HYDROCHLORIDE IN DEXTROSE 1.6 ML: 7.5 INJECTION, SOLUTION SUBARACHNOID at 13:11

## 2023-08-07 RX ADMIN — TRANEXAMIC ACID 1000 MG: 100 INJECTION, SOLUTION INTRAVENOUS at 14:52

## 2023-08-07 RX ADMIN — ACETAMINOPHEN 650 MG: 325 TABLET ORAL at 18:43

## 2023-08-07 RX ADMIN — PROPOFOL 70 MCG/KG/MIN: 10 INJECTION, EMULSION INTRAVENOUS at 13:13

## 2023-08-07 RX ADMIN — TRANEXAMIC ACID 1000 MG: 100 INJECTION, SOLUTION INTRAVENOUS at 13:33

## 2023-08-07 RX ADMIN — Medication 10 MG: at 15:39

## 2023-08-07 RX ADMIN — BUSPIRONE HYDROCHLORIDE 20 MG: 10 TABLET ORAL at 21:04

## 2023-08-07 RX ADMIN — Medication 20 MG: at 13:35

## 2023-08-07 RX ADMIN — GABAPENTIN 300 MG: 300 CAPSULE ORAL at 21:04

## 2023-08-07 RX ADMIN — Medication 50 MCG: at 14:56

## 2023-08-07 RX ADMIN — OXYCODONE HYDROCHLORIDE 5 MG: 5 TABLET ORAL at 18:43

## 2023-08-07 RX ADMIN — FENTANYL CITRATE 25 MCG: 50 INJECTION INTRAMUSCULAR; INTRAVENOUS at 16:23

## 2023-08-07 RX ADMIN — Medication 5 MG: at 15:34

## 2023-08-07 RX ADMIN — MIDAZOLAM 1 MG: 1 INJECTION INTRAMUSCULAR; INTRAVENOUS at 13:04

## 2023-08-07 RX ADMIN — Medication 50 MCG: at 15:01

## 2023-08-07 RX ADMIN — ACETAMINOPHEN 1000 MG: 500 TABLET ORAL at 12:28

## 2023-08-07 RX ADMIN — MIDAZOLAM 1 MG: 1 INJECTION INTRAMUSCULAR; INTRAVENOUS at 13:08

## 2023-08-07 RX ADMIN — FENTANYL CITRATE 50 MCG: 50 INJECTION INTRAMUSCULAR; INTRAVENOUS at 13:09

## 2023-08-07 RX ADMIN — CEFAZOLIN 2000 MG: 10 INJECTION, POWDER, FOR SOLUTION INTRAVENOUS at 21:06

## 2023-08-07 RX ADMIN — Medication 10 MG: at 14:22

## 2023-08-07 RX ADMIN — SODIUM CHLORIDE, POTASSIUM CHLORIDE, SODIUM LACTATE AND CALCIUM CHLORIDE: 600; 310; 30; 20 INJECTION, SOLUTION INTRAVENOUS at 12:37

## 2023-08-07 RX ADMIN — FENTANYL CITRATE 25 MCG: 50 INJECTION INTRAMUSCULAR; INTRAVENOUS at 15:37

## 2023-08-07 RX ADMIN — Medication 100 MCG: at 14:47

## 2023-08-07 RX ADMIN — SODIUM CHLORIDE: 9 INJECTION, SOLUTION INTRAVENOUS at 18:23

## 2023-08-07 RX ADMIN — BISACODYL 5 MG: 5 TABLET, COATED ORAL at 18:43

## 2023-08-07 RX ADMIN — ALBUMIN (HUMAN) 25 G: 12.5 INJECTION, SOLUTION INTRAVENOUS at 15:22

## 2023-08-07 RX ADMIN — Medication 10 MG: at 14:43

## 2023-08-07 RX ADMIN — Medication 10 MG: at 15:11

## 2023-08-07 ASSESSMENT — ENCOUNTER SYMPTOMS: SHORTNESS OF BREATH: 1

## 2023-08-07 ASSESSMENT — PAIN - FUNCTIONAL ASSESSMENT: PAIN_FUNCTIONAL_ASSESSMENT: 0-10

## 2023-08-07 ASSESSMENT — PAIN SCALES - GENERAL
PAINLEVEL_OUTOF10: 3
PAINLEVEL_OUTOF10: 5
PAINLEVEL_OUTOF10: 4
PAINLEVEL_OUTOF10: 5
PAINLEVEL_OUTOF10: 5

## 2023-08-07 ASSESSMENT — PAIN DESCRIPTION - DESCRIPTORS
DESCRIPTORS: ACHING
DESCRIPTORS: ACHING

## 2023-08-07 ASSESSMENT — PAIN DESCRIPTION - ORIENTATION
ORIENTATION: LEFT
ORIENTATION: LOWER

## 2023-08-07 ASSESSMENT — PAIN DESCRIPTION - LOCATION
LOCATION: BACK;KNEE
LOCATION: LEG;KNEE
LOCATION: KNEE

## 2023-08-07 ASSESSMENT — LIFESTYLE VARIABLES
HOW OFTEN DO YOU HAVE A DRINK CONTAINING ALCOHOL: NEVER
HOW MANY STANDARD DRINKS CONTAINING ALCOHOL DO YOU HAVE ON A TYPICAL DAY: PATIENT DOES NOT DRINK

## 2023-08-07 NOTE — OP NOTE
Operative Note      Patient: Poncho Current  YOB: 1955  MRN: [id number]    Date of Procedure: 8/7/2023    Pre-Op Diagnosis Codes:     * Infected prosthetic knee joint, subsequent encounter [T84.59XD, N52.153]    Post-Op Diagnosis: Same       Procedure(s):  REMOVAL OF DEEP IMPLANT LEFT KNEE  LEFT KNEE TOTAL ARTHROPLASTY REVISION     Surgeon(s):  Marybeth Kerr DO    Assistant:   Resident: Reji Arreola MD; Fior Curry DO    Anesthesia: Spinal    Estimated Blood Loss (mL): 393 cc    Complications: None    Specimens:   ID Type Source Tests Collected by Time Destination   1 : LEFT KNEE #1 Tissue Joint, Knee CULTURE, TISSUE Tenna Crater, DO 8/7/2023 1408    2 : LEFT KNEE #2 Tissue Joint, Knee CULTURE, TISSUE Tenna Crater, DO 8/7/2023 1409    3 : LFT KNEE #3 Tissue Joint, Knee CULTURE, TISSUE Tenna Crater, DO 8/7/2023 1410    4 : LEFT KNEE #4 Tissue Joint, Knee CULTURE, TISSUE Tenna Crater, DO 8/7/2023 1411    A : LEFT KNEE TISSUE #1 Tissue Joint, Knee SURGICAL PATHOLOGY Tenna Crater, DO 8/7/2023 1354    B : LEFT KNEE # 2 Tissue Joint, Knee SURGICAL PATHOLOGY Tenna Crater, DO 8/7/2023 1355    C : LEFT KNEE #3 Tissue Joint, Knee SURGICAL PATHOLOGY Tenna Crater, DO 8/7/2023 1356    D : LEFT KNEE #4 Tissue Joint, Knee SURGICAL [address] [place] [address], DO 8/7/2023 1356        Implants:  Implant Name Type Inv.  Item Serial No.  Lot No. LRB No. Used Action   PSN REV TIB FIXED KEEL CMT  G L - [id number]  PSN REV TIB FIXED KEEL CMT  G L  [facility] [id number] Left 1 Implanted   PSN REV TM TIB CENTRAL CONE [facility] - [id number]  PSN REV TM TIB CENTRAL CONE [facility]  [facility] [id number] Left 1 Implanted   PSN REV 3MM OFFSET STEM EXT [id number] - [id number]  PSN REV 3MM OFFSET STEM EXT [id number]  [facility] [id number] Left 1 Implanted   COMPONENT FEM HENOK 9+ LT KNEE REV COCR PERSONA - [id number]  COMPONENT FEM HENOK 9+ LT KNEE REV [facility]  [employer]- [id number] Left 1 Implanted   PSN REV 3MM OFFSET STEM EXT [id number] - [id number]  PSN REV 3MM OFFSET STEM EXT [id number]  [employer]- [id number] Left 1 Implanted   COMPONENT PAT QVE52BS THK9.5MM KNEE POLY HENOK CONVENTIONAL - [id number]  COMPONENT PAT [id number] THK9.5MM KNEE POLY [employer]  [employer]- [id number] Left 1 Implanted   CEMENT BNE 40GM W/ GENT HI VISC RADPQ FOR REV SURG - [id number]  CEMENT BNE 40GM W/ GENT HI VISC RADPQ FOR REV SURG  [employer]- [id number] Left 2 Implanted   CEMENT BNE 40GM W/ GENT HI VISC RADPQ FOR REV SURG - [id number]  CEMENT BNE 40GM W/ GENT HI VISC RADPQ FOR REV SURG  [employer]- [id number] Left 1 Implanted   SURFACE ARTC FEM 6-9 TIB GH/PS H14MM LT CONSTRN POST STBL - [id number]  SURFACE ARTC FEM 6-9 TIB GH/PS H14MM LT CONSTRN POST STBL  ED [employer]- [id number] Left 1 Implanted         Drains: * No LDAs found *    Implants:   Femur 9+ with 24 mm stem 3mm offset placed at 9:30 position    Tibia: G with 22 mm stem 3 mm offset placed at 3:00 position    Small central tibial cone  38 poly  14 CPS poly    Surgical Indications: Darci Mei is a 79 y.o. old female who presented with a left total knee arthroplasty infection that underwent first stage implant removal and antibiotics spacer. Several months of the surgery, felt that the infection resolved after discussion the patient consented to proceed with a spacer removal and left total knee revision. she came to this decision after demonstrating an understanding of our discussion regarding details of the procedure, risks and benefits, expected outcome, and postoperative course. Technique: On the day of surgery, the patient was met in the preoperative unit, where written consent was obtained. The operative site was marked with a permanent marker.  The anesthesia

## 2023-08-07 NOTE — PROGRESS NOTES
Pt admitted to room 2014 in stable condition from PACU. Oriented to room and surroundings. Bed in lowest position, wheels locked, 2/4 side rails up. Call light in reach, room free of clutter, adequate lighting provided. Denies any further questions at this time. Instructed to call out with any questions/concerns/new onset of pain and/or n/v. White board updated. Continue to monitor with hourly rounding. Daughter Ray Mario at bedside.

## 2023-08-07 NOTE — PROGRESS NOTES
Orthopedic Coordinator Note    Patient s/p left total Knee replacement REVISION on 08/07/2023 WITH DR. Chip Charles. The following appointments are currently scheduled:    Post-op with surgeon 08/21/2023 AT [address] [place] [address]. Physical Therapy C WITH [facility]/PT HAS USED THEM IN June AND PREVIOUSLY. DEJA IS AWARE. Wheeled walker order is not entered  Face to face documentation is N/A-PT HAS A FWW. DVT Prophylaxis: 81MG EC ASA bid x 35 days.       Any questions please contact Kee Fonseca RN, MSN  [phone]      Electronically signed by: Kee Fonseca RN on 8/7/2023 at 8:53 AM

## 2023-08-07 NOTE — ANESTHESIA PROCEDURE NOTES
Spinal Block    Patient location during procedure: OR  End time: 8/7/2023 1:16 PM  Reason for block: primary anesthetic  Staffing  Performed: resident/CRNA   Anesthesiologist: Yesenia Camarillo MD  Resident/CRNA: MARIA M Dos Santos CRNA  Spinal Block  Patient position: sitting  Prep: ChloraPrep  Patient monitoring: continuous pulse ox and frequent blood pressure checks  Approach: midline  Location: L3/L4  Guidance: paresthesia technique  Provider prep: mask and sterile gloves  Needle  Needle type: Quincke   Needle gauge: 25 G  Needle length: 3.5 in  Assessment  Sensory level: T8  Swirl obtained: Yes  CSF: clear  Attempts: 1  Hemodynamics: stable

## 2023-08-07 NOTE — PROGRESS NOTES
Physical Therapy  DATE: 2023    NAME: Marilee Moy  MRN: [id number]   : 1955    Patient not seen this date for Physical Therapy due to:      [x] Cancel by RN or physician due to:  Writer spoke w/ PACU RN who reports pt not medically appropriate for therapy evaluation at this time as pt's current SBP is in the 90s supine. Per chart, pt's pre-op SBP was between 140-150s. PT will continue to follow and evaluate in AM.      [] Hemodialysis    [] Critical Lab Value Level     [] Blood transfusion in progress    [] Acute or unstable cardiovascular status   _MAP < 55 or more than >115  _HR < 40 or > 130    [] Acute or unstable pulmonary status   -FiO2 > 60%   _RR < 5 or >40    _O2 sats < 85%    [] Strict Bedrest    [] Off Unit for surgery or procedure    [] Off Unit for testing       [] Pending imaging to R/O fracture    [] Refusal by Patient      [] Other      [] PT being discontinued at this time. Patient independent. No further needs. [] PT being discontinued at this time as the patient has been transferred to hospice care. No further needs.       Tejinder Mc, PT

## 2023-08-07 NOTE — DISCHARGE INSTR - COC
Continuity of Care Form    Patient Name: Carrie Forrester   :  1955  MRN:  [id number]    Admit date:  (Not on file)  Discharge date:  23    Code Status Order: Prior   Advance Directives:     Admitting Physician:  Eliazar Villarreal DO  PCP: MARIA M Jensen CNP    Discharging Nurse: [facility] Unit/Room#: No information available for this encounter. Discharging Unit Phone Number: [phone]    Emergency Contact:   Extended Emergency Contact Information  Primary Emergency Contact: CECILIA VENTURA  Address: [address] [place] [address] of [address] Phone: [phone]  Mobile Phone: [phone]  Relation: Child  Secondary Emergency Contact: [facility]  Address: [address] [place] [address] of [address] Phone: [phone]  Mobile Phone: [phone]  Relation: Niece/Nephew    Past Surgical History:  Past Surgical History:   Procedure Laterality Date    CHOLECYSTECTOMY      EYE SURGERY Bilateral     Cataracts    HIP FRACTURE SURGERY Right 2016    closed reduction percutaneous pinning    HYSTERECTOMY ( [facility])  2005    IR INS PICC VAD W SQ PORT GREATER THAN 5  2023    IR INS PICC VAD W SQ PORT GREATER THAN 5 2023 [facility]    JOINT REPLACEMENT Bilateral     Hip , bilaterally.  L. Knee    REVISION TOTAL KNEE ARTHROPLASTY Left 2023    LEFT KNEE   REMOVAL OF INFECTED PROSTHESIS AND PLACEMENT OF SPACER- ORTHO REMEDIES, ED performed by Eliazar Villarreal DO at [address]  2005    L4/L5 CCage inmplanted       Immunization History:   Immunization History   Administered Date(s) Administered    Influenza Virus Vaccine 2005, 2005, 2012    Pneumococcal, PCV-13, PREVNAR 15, (age 6w+), IM, 0.5mL 2019    TDaP, ADACEL (age 6y-58y), BOOSTRIX (age 10y+), IM, 0.5mL 2015       Active Problems:  Patient Active Problem List Respiratory Treatments: none  Oxygen Therapy:  is not on home oxygen therapy. Ventilator:    - No ventilator support    Rehab Therapies: Physical Therapy  Weight Bearing Status/Restrictions: No weight bearing restrictions  Other Medical Equipment (for information only, NOT a DME order):  walker  Other Treatments: LEFT TOTAL KNEE REVISION. Patient's personal belongings (please select all that are sent with patient):  Glasses, clothing, cell phone and      RN SIGNATURE:  Electronically signed by Stone Rawls RN on 8/8/23 at 12:14 PM EDT    CASE MANAGEMENT/SOCIAL WORK SECTION    Inpatient Status Date: ***    Readmission Risk Assessment Score:  Readmission Risk              Risk of Unplanned Readmission:  0           Discharging to Facility/ Agency   Name: [address] [place] [address], [address] [facility]  [phone]    Fax:    / signature: Electronically signed by Mei Aguila RN on 8/7/23 at 9:05 AM EDT    PHYSICIAN SECTION    Prognosis: Good    Condition at Discharge: Stable    Rehab Potential (if transferring to Rehab): Good    Recommended Labs or Other Treatments After Discharge: [address] [facility]    Physician Certification: I certify the above information and transfer of Ina Palacio  is necessary for the continuing treatment of the diagnosis listed and that she requires Home Care for less 30 days.      Update Admission H&P: No change in H&P    PHYSICIAN SIGNATURE:  Electronically signed by Kia King DO  on 8/7/23 at 9:06 AM EDT

## 2023-08-08 VITALS
OXYGEN SATURATION: 97 % | HEIGHT: 70 IN | BODY MASS INDEX: 32.07 KG/M2 | SYSTOLIC BLOOD PRESSURE: 113 MMHG | DIASTOLIC BLOOD PRESSURE: 54 MMHG | HEART RATE: 74 BPM | TEMPERATURE: 97.9 F | RESPIRATION RATE: 18 BRPM | WEIGHT: 224 LBS

## 2023-08-08 LAB
ANION GAP SERPL CALCULATED.3IONS-SCNC: 7 MMOL/L (ref 9–17)
BASOPHILS # BLD: 0 K/UL (ref 0–0.2)
BASOPHILS NFR BLD: 0 % (ref 0–2)
BUN SERPL-MCNC: 13 MG/DL (ref 8–23)
BUN/CREAT SERPL: 16 (ref 9–20)
CALCIUM SERPL-MCNC: 8.2 MG/DL (ref 8.6–10.4)
CHLORIDE SERPL-SCNC: 102 MMOL/L (ref 98–107)
CO2 SERPL-SCNC: 30 MMOL/L (ref 20–31)
CREAT SERPL-MCNC: 0.8 MG/DL (ref 0.5–0.9)
EOSINOPHIL # BLD: 0 K/UL (ref 0–0.44)
EOSINOPHILS RELATIVE PERCENT: 0 % (ref 1–4)
ERYTHROCYTE [DISTWIDTH] IN BLOOD BY AUTOMATED COUNT: 19.8 % (ref 11.8–14.4)
GFR SERPL CREATININE-BSD FRML MDRD: >60 ML/MIN/1.73M2
GLUCOSE SERPL-MCNC: 104 MG/DL (ref 70–99)
HCT VFR BLD AUTO: 27.9 % (ref 36.3–47.1)
HCT VFR BLD AUTO: 28 % (ref 36.3–47.1)
HGB BLD-MCNC: 7.9 G/DL (ref 11.9–15.1)
HGB BLD-MCNC: 8 G/DL (ref 11.9–15.1)
IMM GRANULOCYTES # BLD AUTO: 0 K/UL (ref 0–0.3)
IMM GRANULOCYTES NFR BLD: 0 %
LYMPHOCYTES NFR BLD: 0.46 K/UL (ref 1.1–3.7)
LYMPHOCYTES RELATIVE PERCENT: 10 % (ref 24–43)
MCH RBC QN AUTO: 25.6 PG (ref 25.2–33.5)
MCHC RBC AUTO-ENTMCNC: 28.3 G/DL (ref 28.4–34.8)
MCV RBC AUTO: 90.3 FL (ref 82.6–102.9)
MONOCYTES NFR BLD: 0.51 K/UL (ref 0.1–1.2)
MONOCYTES NFR BLD: 11 % (ref 3–12)
MORPHOLOGY: ABNORMAL
NEUTROPHILS NFR BLD: 79 % (ref 36–65)
NEUTS SEG NFR BLD: 3.63 K/UL (ref 1.5–8.1)
NRBC BLD-RTO: 0 PER 100 WBC
PLATELET # BLD AUTO: 88 K/UL (ref 138–453)
PMV BLD AUTO: 12.2 FL (ref 8.1–13.5)
POTASSIUM SERPL-SCNC: 3.7 MMOL/L (ref 3.7–5.3)
RBC # BLD AUTO: 3.09 M/UL (ref 3.95–5.11)
SODIUM SERPL-SCNC: 139 MMOL/L (ref 135–144)
WBC OTHER # BLD: 4.6 K/UL (ref 3.5–11.3)

## 2023-08-08 PROCEDURE — 97110 THERAPEUTIC EXERCISES: CPT

## 2023-08-08 PROCEDURE — 85025 COMPLETE CBC W/AUTO DIFF WBC: CPT

## 2023-08-08 PROCEDURE — 2580000003 HC RX 258: Performed by: ORTHOPAEDIC SURGERY

## 2023-08-08 PROCEDURE — 97535 SELF CARE MNGMENT TRAINING: CPT

## 2023-08-08 PROCEDURE — 97116 GAIT TRAINING THERAPY: CPT

## 2023-08-08 PROCEDURE — 97166 OT EVAL MOD COMPLEX 45 MIN: CPT

## 2023-08-08 PROCEDURE — 36415 COLL VENOUS BLD VENIPUNCTURE: CPT

## 2023-08-08 PROCEDURE — 6360000002 HC RX W HCPCS: Performed by: ORTHOPAEDIC SURGERY

## 2023-08-08 PROCEDURE — 97530 THERAPEUTIC ACTIVITIES: CPT

## 2023-08-08 PROCEDURE — 2580000003 HC RX 258

## 2023-08-08 PROCEDURE — 80048 BASIC METABOLIC PNL TOTAL CA: CPT

## 2023-08-08 PROCEDURE — 85014 HEMATOCRIT: CPT

## 2023-08-08 PROCEDURE — 97163 PT EVAL HIGH COMPLEX 45 MIN: CPT

## 2023-08-08 PROCEDURE — 6370000000 HC RX 637 (ALT 250 FOR IP)

## 2023-08-08 PROCEDURE — 1200000000 HC SEMI PRIVATE

## 2023-08-08 PROCEDURE — 85018 HEMOGLOBIN: CPT

## 2023-08-08 RX ADMIN — SODIUM CHLORIDE: 9 INJECTION, SOLUTION INTRAVENOUS at 05:04

## 2023-08-08 RX ADMIN — POLYETHYLENE GLYCOL 3350 17 G: 17 POWDER, FOR SOLUTION ORAL at 09:50

## 2023-08-08 RX ADMIN — CEFAZOLIN 2000 MG: 10 INJECTION, POWDER, FOR SOLUTION INTRAVENOUS at 05:06

## 2023-08-08 RX ADMIN — ACETAMINOPHEN 650 MG: 325 TABLET ORAL at 00:13

## 2023-08-08 RX ADMIN — OXYCODONE HYDROCHLORIDE 5 MG: 5 TABLET ORAL at 00:13

## 2023-08-08 RX ADMIN — ACETAMINOPHEN 650 MG: 325 TABLET ORAL at 05:07

## 2023-08-08 RX ADMIN — OXYCODONE HYDROCHLORIDE 10 MG: 5 TABLET ORAL at 09:49

## 2023-08-08 RX ADMIN — BISACODYL 5 MG: 5 TABLET, COATED ORAL at 09:49

## 2023-08-08 RX ADMIN — GABAPENTIN 300 MG: 300 CAPSULE ORAL at 09:50

## 2023-08-08 RX ADMIN — BUSPIRONE HYDROCHLORIDE 20 MG: 10 TABLET ORAL at 09:50

## 2023-08-08 RX ADMIN — LEVOTHYROXINE SODIUM 112 MCG: 112 TABLET ORAL at 09:50

## 2023-08-08 RX ADMIN — ASPIRIN 81 MG: 81 TABLET, COATED ORAL at 09:50

## 2023-08-08 RX ADMIN — SERTRALINE HYDROCHLORIDE 25 MG: 25 TABLET ORAL at 09:50

## 2023-08-08 RX ADMIN — OXYCODONE HYDROCHLORIDE 10 MG: 5 TABLET ORAL at 05:07

## 2023-08-08 ASSESSMENT — PAIN DESCRIPTION - ORIENTATION: ORIENTATION: LEFT

## 2023-08-08 ASSESSMENT — PAIN - FUNCTIONAL ASSESSMENT: PAIN_FUNCTIONAL_ASSESSMENT: PREVENTS OR INTERFERES SOME ACTIVE ACTIVITIES AND ADLS

## 2023-08-08 ASSESSMENT — PAIN SCALES - GENERAL
PAINLEVEL_OUTOF10: 3
PAINLEVEL_OUTOF10: 3

## 2023-08-08 ASSESSMENT — PAIN DESCRIPTION - FREQUENCY: FREQUENCY: CONTINUOUS

## 2023-08-08 ASSESSMENT — PAIN DESCRIPTION - PAIN TYPE: TYPE: SURGICAL PAIN

## 2023-08-08 ASSESSMENT — PAIN DESCRIPTION - ONSET: ONSET: ON-GOING

## 2023-08-08 ASSESSMENT — PAIN DESCRIPTION - DESCRIPTORS: DESCRIPTORS: SORE

## 2023-08-08 ASSESSMENT — PAIN DESCRIPTION - LOCATION: LOCATION: KNEE

## 2023-08-08 NOTE — PLAN OF CARE
Problem: Discharge Planning  Goal: Discharge to home or other facility with appropriate resources  8/8/2023 1202 by Max Cortez RN  Outcome: Adequate for Discharge  Flowsheets (Taken 8/8/2023 9618)  Discharge to home or other facility with appropriate resources:   Identify barriers to discharge with patient and caregiver   Arrange for needed discharge resources and transportation as appropriate   Identify discharge learning needs (meds, wound care, etc)   Refer to discharge planning if patient needs post-hospital services based on physician order or complex needs related to functional status, cognitive ability or social support system     Problem: Pain  Goal: Verbalizes/displays adequate comfort level or baseline comfort level  8/8/2023 1202 by Max Cortez RN  Outcome: Adequate for Discharge  Flowsheets (Taken 8/8/2023 0742)  Verbalizes/displays adequate comfort level or baseline comfort level:   Encourage patient to monitor pain and request assistance   Assess pain using appropriate pain scale   Administer analgesics based on type and severity of pain and evaluate response   Implement non-pharmacological measures as appropriate and evaluate response   Notify Licensed Independent Practitioner if interventions unsuccessful or patient reports new pain     Problem: Safety - Adult  Goal: Free from fall injury  8/8/2023 1202 by Max Cortez RN  Outcome: Adequate for Discharge  Note: Patient is a fall risk during this admission. Fall risk assessment was performed. Patient is absent of falls. Bed is in the lowest position. Wheels on the bed are locked. Call light and bed side table are within reach. Clutter is removed. Patient was educated to call out when needing assistance or wanting to get out of bed. Patient offered toileting assistance during rounding. Hourly rounds have been performed.        Problem: ABCDS Injury Assessment  Goal: Absence of physical injury  8/8/2023 1202 by Max Cortez RN  Outcome: Adequate for

## 2023-08-08 NOTE — PROGRESS NOTES
Writer made pt a f/u appt with [facility] MARIA Arreola in [place] office for [facility] removal and optifoam applied.   08/14/2023 at 1014

## 2023-08-08 NOTE — PROGRESS NOTES
Physical Therapy  Facility/Department: [facility] [[email or web address] MED SURG  Physical Therapy Initial Assessment- POD 1 (D/C home)    Name: Petey Stock  : 1955  MRN: [id number]  Date of Service: 2023    Discharge Recommendations:    Due to recent hospitalization and medical condition, pt would benefit from additional intermittent skilled therapy at time of discharge. Please refer to the AM-PAC score for current functional status. L TKA revision by Dr. Sher Toney, 23      Patient Diagnosis(es): The primary encounter diagnosis was Post-op pain. A diagnosis of Infected prosthetic knee joint, subsequent encounter was also pertinent to this visit. Past Medical History:  has a past medical history of Chronic back pain, Hypertension, Hypothyroidism, and Osteoarthritis. Past Surgical History:  has a past surgical history that includes Spine surgery (2005); Hysterectomy (2005); Cholecystectomy; Hip fracture surgery (Right, 2016); eye surgery (Bilateral, ); joint replacement (Bilateral); Revision total knee arthroplasty (Left, 2023); IR INSERT PICC VAD W SQ PORT >5 YEARS (2023); and Revision total knee arthroplasty (Left, 2023). Assessment   Body Structures, Functions, Activity Limitations Requiring Skilled Therapeutic Intervention: Decreased functional mobility   Assessment: Able to complete all training for safe d/c home POD 1. See GOALS section. Therapy Prognosis: Excellent  Decision Making: High Complexity  Requires PT Follow-Up: Yes  Activity Tolerance  Activity Tolerance: Patient tolerated evaluation without incident     Plan   Physcial Therapy Plan  General Plan: Discharge with evaluation only (POD 1 eval)  Safety Devices  Type of Devices:  All fall risk precautions in place, Gait belt, Patient at risk for falls, Call light within reach, Nurse notified, Bed alarm in place, Left in bed     Restrictions  Restrictions/Precautions  Restrictions/Precautions: Weight Bearing, Fall Risk, 74  Heart Rate Source: Monitor  BP: (!) 113/54  BP Location: Right upper arm  Patient Position: Sitting  MAP (Calculated): 74  Respirations: 18  SpO2: 97 %  O2 Device: None (Room air)     Observation/Palpation  Observation: Just back to bed from chair- eating breakfast;  states that overnight she has been up to bathroom several times overnight and up to chair. Gross Assessment  Sensation: Impaired (feet- from back)     AROM RLE (degrees)  RLE General AROM: genu valgus with tight HS; lacking 20 degrees knee extension  AROM LLE (degrees)  LLE General AROM: genu valgus; 10-50 degrees           Transfers  Sit to Stand: Contact guard assistance  Stand to Sit: Contact guard assistance  Ambulation  Surface: Level tile  Device: Rolling Walker  Assistance: Contact guard assistance  Quality of Gait: good demo step-to gait with RW; occ. cues to amb. inside RW and not push too far ahead with cues to correct fwd. posture  Distance: 35ft; 25ft; 15ft  Comments: toilet transfer CGA;  back to bed after eval to wait for second OT session  Stairs/Curb  Stairs?: Yes  Stairs  # Steps : 5  Stairs Height: 6\"  Rails: Bilateral  Assistance: Contact guard assistance  Comment: good demo stairs with proper stepping sequence with only initial cues needed (pt's third knee surgery)        Exercise Treatment: Issued pt. written TKA HEP and reviewed all with pt. Pt. with good demo and understanding (third knee surg.  since 12/22)        OutComes Score                                                  AM-PAC Score  AM-PAC Inpatient Mobility Raw Score : 24 (08/08/23 1430)  AM-PAC Inpatient T-Scale Score : 61.14 (08/08/23 1430)  Mobility Inpatient CMS 0-100% Score: 0 (08/08/23 1430)  Mobility Inpatient CMS G-Code Modifier : [address] (08/08/23 1430)          Tinneti Score       Goals  Pt. has met all PT goals for safe discharge POD 1 including safe bed mobility and transfer techniques, safe ambulation with RW including walker safety, training for safe stair

## 2023-08-08 NOTE — PROGRESS NOTES
Occupational Therapy  Facility/Department: [facility] MED SURG  Rehabilitation Occupational Therapy Daily Treatment Note    Date: 23  Patient Name: Darci Mei       Room:   MRN: [id number]  Account: [de-identified]   : 1955  (70 y.o.) Gender: female        FRANKO Mendoza reports patient is medically stable for therapy treatment this date. Chart reviewed prior to treatment and patient is agreeable for therapy. All lines intact and patient positioned comfortably at end of treatment. All patient needs addressed prior to ending therapy session. Due to recent surgery pt would benefit from additional intermittent skilled therapy at time of discharge. Please refer to the AM-PAC score for current functional status. Additional Pertinent Hx: pt with orginal TKA , had infection/spacer placed in , and now TKA revision on 2023        Past Medical History:  has a past medical history of Chronic back pain, Hypertension, Hypothyroidism, and Osteoarthritis. Past Surgical History:   has a past surgical history that includes Spine surgery (2005); Hysterectomy (2005); Cholecystectomy; Hip fracture surgery (Right, 2016); eye surgery (Bilateral, ); joint replacement (Bilateral); Revision total knee arthroplasty (Left, 2023); IR INSERT PICC VAD W SQ PORT >5 YEARS (2023); and Revision total knee arthroplasty (Left, 2023). Restrictions  Restrictions/Precautions: Weight Bearing, Fall Risk, Bed Alarm, General Precautions, Surgical protocol, Up as Tolerated  Other position/activity restrictions: wound vac, Rt. UE IV  Left Lower Extremity Weight Bearing: Weight Bearing As Tolerated    Subjective  Subjective: Pt resting in bed upon arrival agreeable to OT. \"I am ready to get home. \"  Restrictions/Precautions: Weight Bearing; Fall Risk;Bed Alarm;General Precautions;Surgical protocol; Up as Tolerated             Objective     Cognition  Overall Cognitive Status: Treatment: While seated in chair pt completed UB theraband exercises in all planes for 1 set x 10 reps with orange band to increase UB strength for increased IND and ease with ADL transfers. Good return to visual/verbal demo and no rest breaks needed. HEP given and edu on benefits of completing on own. Assessment  Assessment  Assessment: Pt tolerated session well able to complete all dressing with SUP/setup while seated EOB and was able to complete all toileting on own. Pt also completed UB theraband exercises well and was receptive to all edu given. Pt ready for d/c home. Activity Tolerance: Patient tolerated treatment well  Discharge Recommendations: Patient would benefit from continued therapy after discharge  Safety Devices  Safety Devices in place: Yes  Type of devices: Nurse notified; Left in chair;Call light within reach;Gait belt    Patient Education  Education  Education Given To: Patient  Education Provided: Role of Therapy;Plan of Care;Home Exercise Program;Precautions; Safety; Energy Conservation;Transfer Training;ADL [employer]; Fall Prevention Strategies  Education Method: Verbal;Demonstration;Printed Information/Hand-outs  Barriers to Learning: None  Education Outcome: Verbalized understanding;Demonstrated understanding    Plan  Occupational Therapy Plan  Times Per Week: 5-6x/week, 1-2x/day  Current Treatment Recommendations: Strengthening;Balance training;Functional mobility training; Safety education & training;Self-Care / ADL; Patient/Caregiver education & training; Endurance training;Equipment evaluation, education, & procurement    Goals  Patient Goals   Patient goals : to go home today  Short Term Goals  Time Frame for Short Term Goals: by discharge, pt will  Short Term Goal 1: demo and verb good understanding of ed provided on fall prevention techs, EC/WS techs, TKA precautions, ADL techs, equip needs, B UE HEP  Short Term Goal 2: demo SBA with ADL transfers and functional mob

## 2023-08-08 NOTE — DISCHARGE INSTR - ACTIVITY
ANY ORTHOPEDIC QUESTIONS OR ANY OTHER CONCERNS YOU MAY CALL THE ORTHOPEDIC COORDINATOR:  Jamie Larson RN, MSN  [phone]  [email or web address]    DISCHARGE INSTRUCTIONS  Caring for yourself after joint replacement surgery (Total Hip and Total Knee Replacement)    Activity and Therapy  Receive physical therapy three times per week. (Pain medication one hour prior to therapy)   Perform PT exercises on own when not receiving home or outpatient PT. Ideally exercises should be at least two times a day. Increase level of activity and ambulation each day. Perform deep breathing exercises daily. Patient provides self-care when possible. Work on Range of motion for Total knee patients. No pillow under the knee for Total knee patients. Elevate the surgical leg when seated. No driving until cleared by surgeon      Diet:  Increase oral intake of fruits, fiber and water to prevent constipation. Drink fluids frequently and take stool softeners to aid in bowel motility. Increase protein intake/reduce high-sugar intake to help promote healing and prevent infection. Incision Care:  Keep Aquacel or other dressing intact until seen and removed by surgeon, unless saturated, in which case, call surgeon and request instructions. If dressing falls off, call surgeon. [facility] on in the am and off in the pm to reduce swelling. Ice affected area four times a day, for twenty minutes. Pain Medications and Anticoagulant  You have been place on an anticoagulant to prevent blood clots. Take this medication exactly as prescribed. Be alert for signs of bleeding. Take care not to injure yourself. You have been provided pain medicine to control your pain. Do not take more narcotics than prescribed. You may begin weaning from narcotics as your pain level improves by decreasing the amount or frequency of the narcotics. You may also take plain acetaminophen as an alternate to the narcotics.    Never exceed the

## 2023-08-08 NOTE — PROGRESS NOTES
Occupational Therapy  Facility/Department: [facility] MED SURG  Occupational Therapy Initial Assessment    Name: Gene Alexander  : 1955  MRN: [id number]  Date of Service: 2023    L TKA Revision 2023    Discharge Recommendations:  Patient would benefit from continued therapy after discharge   RN reports patient is medically stable for therapy treatment this date. Chart reviewed prior to treatment and patient is agreeable for therapy. All lines intact and patient positioned comfortably at end of treatment. All patient needs addressed prior to ending therapy session. Patient Diagnosis(es): The primary encounter diagnosis was Post-op pain. A diagnosis of Infected prosthetic knee joint, subsequent encounter was also pertinent to this visit. Past Medical History:  has a past medical history of Chronic back pain, Hypertension, Hypothyroidism, and Osteoarthritis. Past Surgical History:  has a past surgical history that includes Spine surgery (2005); Hysterectomy (2005); Cholecystectomy; Hip fracture surgery (Right, 2016); eye surgery (Bilateral, ); joint replacement (Bilateral); Revision total knee arthroplasty (Left, 2023); IR INSERT PICC VAD W SQ PORT >5 YEARS (2023); and Revision total knee arthroplasty (Left, 2023). RN reports patient is medically stable for therapy treatment this date. Chart reviewed prior to treatment and patient is agreeable for therapy. All lines intact and patient positioned comfortably at end of treatment. All patient needs addressed prior to ending therapy session. Assessment   Performance deficits / Impairments: Decreased functional mobility ; Decreased ADL status; Decreased strength;Decreased safe awareness;Decreased endurance;Decreased balance;Decreased posture  Assessment: Pt would benefit from further OT treatment prior to d/c home but then anticipate pt will meet goals prior to d/c  Prognosis: Good  Decision of RW during functional contexts at home inc. ADL/IADL completion. Pt ed on safe way to approach sink, counter, and high/low reaching. Pt ed importance of staying within device at all times when navigating doorways and turning.                 AM-PAC Score        AM-PAC Inpatient Daily Activity Raw Score: 17 (08/08/23 1244)  AM-PAC Inpatient ADL T-Scale Score : 37.26 (08/08/23 1244)  ADL Inpatient CMS 0-100% Score: 50.11 (08/08/23 1244)  ADL Inpatient CMS G-Code Modifier : CK (08/08/23 1244)    Tinneti Score       Goals  Short Term Goals  Time Frame for Short Term Goals: by discharge, pt will  Short Term Goal 1: demo and verb good understanding of ed provided on fall prevention techs, EC/WS techs, TKA precautions, ADL techs, equip needs, B UE HEP  Short Term Goal 2: demo SBA with ADL transfers and functional mob household distances with RW and approp DME for safe ADL/IADL completion  Short Term Goal 3: demo I with UB ADLs and min/CGA with LB ADLs with AE/DME as needed  Short Term Goal 4: demo S/MI with toileting routine with DME as needed  Patient Goals   Patient goals : to go home today       Therapy Time   Individual Concurrent Group Co-treatment   Time In 0835         Time Out 0945         Minutes 70             Treatment min: 54      Jamison Abraham OT

## 2023-08-08 NOTE — PLAN OF CARE
Problem: Discharge Planning  Goal: Discharge to home or other facility with appropriate resources  8/8/2023 1437 by Brunilda Frederick RN  Outcome: Completed     Problem: Pain  Goal: Verbalizes/displays adequate comfort level or baseline comfort level  8/8/2023 1437 by Brunilda Frederick RN  Outcome: Completed     Problem: Safety - Adult  Goal: Free from fall injury  8/8/2023 1437 by Brunilda Frederick RN  Outcome: Completed     Problem: ABCDS Injury Assessment  Goal: Absence of physical injury  8/8/2023 1437 by Brunilda Frederick RN  Outcome: Completed     Problem: Skin/Tissue Integrity  Goal: Absence of new skin breakdown  Description: 1. Monitor for areas of redness and/or skin breakdown  2. Assess vascular access sites hourly  3. Every 4-6 hours minimum:  Change oxygen saturation probe site  4. Every 4-6 hours:  If on nasal continuous positive airway pressure, respiratory therapy assess nares and determine need for appliance change or resting period.   8/8/2023 1437 by Brunilda Frederick RN  Outcome: Completed     Problem: Neurosensory - Adult  Goal: Achieves maximal functionality and self care  8/8/2023 1437 by Brunilda Frederick RN  Outcome: Completed     Problem: Skin/Tissue Integrity - Adult  Goal: Incisions, wounds, or drain sites healing without S/S of infection  8/8/2023 1437 by Brunilda Frederick RN  Outcome: Completed     Problem: Musculoskeletal - Adult  Goal: Return ADL status to a safe level of function  8/8/2023 1437 by Brunilda Frederick RN  Outcome: Completed     Problem: Hematologic - Adult  Goal: Maintains hematologic stability  8/8/2023 1437 by Brunilda Frederick RN  Outcome: Completed

## 2023-08-08 NOTE — CARE COORDINATION
DC Planning    Reviewed ortho navigator note. Spoke with pt at bedside. PT is recommending HH-PT . Pt agrees with [facility]-spoke with Lucrecia-pt has been accepted. Pt lives with niece Marilee Camargo and has support. Has  FWW. She will have a ride home.

## 2023-08-10 LAB — SURGICAL PATHOLOGY REPORT: NORMAL

## 2023-08-10 ASSESSMENT — ENCOUNTER SYMPTOMS
RESPIRATORY NEGATIVE: 1
GASTROINTESTINAL NEGATIVE: 1

## 2023-08-11 DIAGNOSIS — Z96.652 STATUS POST REVISION OF TOTAL REPLACEMENT OF LEFT KNEE: Primary | ICD-10-CM

## 2023-08-11 NOTE — PROGRESS NOTES
Physician Progress Note      Stacey Galvan  [facility] #:                  [id number]  :                       1955  ADMIT DATE:       2023 11:16 AM  DISCH DATE:        2023 2:00 PM  RESPONDING  PROVIDER #:        Shannon MCCRARY          QUERY TEXT:    Pt admitted post total knee revision. Pt noted to have decreased hemoglobin. If possible, please document in the progress notes and discharge summary if   you are evaluating and/or treating any of the following: The medical record reflects the following:  Risk Factors: Post - OR  Clinical Indicators: Pre-op Hgb of 10.9, Post-op Hgb of 8, EBL of 500cc  Treatment: IVF, labs    Thank you,  Gilbert Reich RN  Options provided:  -- Acute blood loss anemia  -- Postoperative acute blood loss anemia  -- Other - I will add my own diagnosis  -- Disagree - Not applicable / Not valid  -- Disagree - Clinically unable to determine / Unknown  -- Refer to Clinical Documentation Reviewer    PROVIDER RESPONSE TEXT:    This patient has acute blood loss anemia. Query created by:  Minoo Jacobs on 8/10/2023 1:58 PM      Electronically signed by:  Shannon MCCRARY 2023 9:28 AM

## 2023-08-12 LAB
MICROORGANISM SPEC CULT: ABNORMAL
MICROORGANISM/AGENT SPEC: ABNORMAL
SPECIMEN DESCRIPTION: ABNORMAL

## 2023-08-14 ENCOUNTER — OFFICE VISIT (OUTPATIENT)
Dept: ORTHOPEDIC SURGERY | Age: 68
End: 2023-08-14

## 2023-08-14 VITALS — RESPIRATION RATE: 16 BRPM | HEIGHT: 70 IN | BODY MASS INDEX: 33.82 KG/M2 | OXYGEN SATURATION: 98 % | WEIGHT: 236.2 LBS

## 2023-08-14 DIAGNOSIS — Z96.652 STATUS POST REVISION OF TOTAL REPLACEMENT OF LEFT KNEE: Primary | ICD-10-CM

## 2023-08-14 PROCEDURE — 99024 POSTOP FOLLOW-UP VISIT: CPT | Performed by: PHYSICIAN ASSISTANT

## 2023-08-14 ASSESSMENT — ENCOUNTER SYMPTOMS
CHEST TIGHTNESS: 0
DIARRHEA: 0
COUGH: 0
ABDOMINAL PAIN: 0
SHORTNESS OF BREATH: 0
RESPIRATORY NEGATIVE: 1
CONSTIPATION: 0
COLOR CHANGE: 0
NAUSEA: 0
ABDOMINAL DISTENTION: 0
VOMITING: 0
APNEA: 0

## 2023-08-14 NOTE — PROGRESS NOTES
[address] [facility] SPORTS [facility] [address]  Dept: [phone]  Dept Fax: [phone]        Post Operative Follow Up    Subjective:     Chief Complaint   Patient presents with    Post-Op Check     Left Knee TKA Revision dos 8/7/23     Post Op Surgery:     The patient is here for a follow up after having a removal of deep implant of the left knee and left total knee arthroplasty revision. Date of surgery was 8/7/2023. Therefore the patient is 1 week postop. Patient was placed in a Prevena incisional wound VAC. Patient is taking aspirin 81 mg twice a day. She is having physical therapy at home 3 times a week. She is taking Percocet for pain. She presents today with a walker. Patient states that she is doing okay overall. She denies any nausea, vomiting, shortness of breath or chest pain. Review of Systems   Constitutional:  Positive for activity change. Negative for appetite change, fatigue and fever. Respiratory: Negative. Negative for apnea, cough, chest tightness and shortness of breath. Cardiovascular: Negative. Negative for chest pain, palpitations and leg swelling. Gastrointestinal:  Negative for abdominal distention, abdominal pain, constipation, diarrhea, nausea and vomiting. Genitourinary:  Negative for difficulty urinating, dysuria and hematuria. Musculoskeletal:  Positive for arthralgias, gait problem and joint swelling. Negative for myalgias. Skin:  Negative for color change and rash. Neurological:  Negative for dizziness, weakness, numbness and headaches. Psychiatric/Behavioral:  Negative for sleep disturbance. I have reviewed the CC, HPI, ROS, PMH, FHX, Social History, and if not present in this note, I have reviewed in the patient's chart.  I agree with the documentation provided by other staff and have reviewed their documentation prior to providing

## 2023-08-15 NOTE — ANESTHESIA POSTPROCEDURE EVALUATION
Department of Anesthesiology  Postprocedure Note    Patient: Justin Grant  MRN: [id number]  YOB: 1955  Date of evaluation: 8/15/2023      Procedure Summary     Date: 08/07/23 Room / Location: [facility] [address] - INPATIENT    Anesthesia Start: 1303 Anesthesia Stop: 2099    Procedure: LEFT KNEE TOTAL ARTHROPLASTY REVISION WITH FROZEN SECTIONS (Left: Knee) Diagnosis:       Infected prosthetic knee joint, subsequent encounter      (Infected prosthetic knee joint, subsequent encounter [T84.59XD, I31.346])    Surgeons: Paramjit Rae DO Responsible Provider: Karen Gaviria MD    Anesthesia Type: spinal ASA Status: 2          Anesthesia Type: No value filed.     Nghia Phase I: Nghia Score: 10    Nghia Phase II:        Anesthesia Post Evaluation    Complications: no

## 2023-08-18 ENCOUNTER — TELEPHONE (OUTPATIENT)
Dept: ORTHOPEDIC SURGERY | Age: 68
End: 2023-08-18

## 2023-08-18 NOTE — TELEPHONE ENCOUNTER
Calvin Mcnair from PT calling asking if the patient can take tylenol or ibuprofen for break through pain.  Please call patient with answer at [phone]

## 2023-08-18 NOTE — TELEPHONE ENCOUNTER
Writer called and left the patient a detailed message informing her it is OK to take Tylenol up to 3,000mg daily and to keep in mind with taking percocet there is 325mg of tylenol in that when she is doing her calculations. Thank you.

## 2023-09-05 ENCOUNTER — HOSPITAL ENCOUNTER (OUTPATIENT)
Dept: PHYSICAL THERAPY | Age: 68
Setting detail: THERAPIES SERIES
Discharge: HOME | End: 2023-09-05
Payer: MEDICARE

## 2023-09-05 PROCEDURE — 97161 PT EVAL LOW COMPLEX 20 MIN: CPT

## 2023-09-05 PROCEDURE — 97110 THERAPEUTIC EXERCISES: CPT

## 2023-09-11 ENCOUNTER — HOSPITAL ENCOUNTER (OUTPATIENT)
Dept: PHYSICAL THERAPY | Age: 68
Setting detail: THERAPIES SERIES
Discharge: HOME | End: 2023-09-11
Payer: MEDICARE

## 2023-09-11 PROCEDURE — 97110 THERAPEUTIC EXERCISES: CPT

## 2023-09-11 NOTE — FLOWSHEET NOTE
[x] [facility]) [facility] & Therapy  [address]  [place]: [phone]   F: [phone]    Physical Therapy Daily Treatment Note    Date:  2023  Patient Name:  Maida Oseguera    :  1955  MRN: [id number]  Physician: Ravi Carvajal PA-C                                   Insurance: [place] [employer] (40$ copay, [place]), Secondary: [place] Medicaid (Reena Carltonr)  Medical Diagnosis: Y72.303 (ICD-10-CM) - Status post revision of total replacement of left knee                       Rehab Codes: M62.81 Weakness, M25.562 Left knee pain  Onset Date: 23                                 Next 's appt: 23  Visit# / total visits: 2/8  Cancels/No Shows: 0/0    Subjective:  Patient reports that she is more sore today with the change in weather, she reports her low back is most bothersome  Pain:  [x] Yes  [x] No Location: L knee   Pain Rating: (0-10 scale) 0/10  Pain altered Tx:  [] No  [] Yes  Action:  Comments:    Objective:  Modalities:   Precautions: L TKA on 23  Exercises:  Exercise Reps/ Time Weight/ Level Completed  Today Comments   NuStep 5' L3 x           LAQ 2x10 2# x           3 way hip  2x10 Red x    Minisquat 2x10  x    Lateral stepping 2 laps  // bars x    Standing MArch 2x10  x    Romberg stance 3x20\"  x    Hip flx stretch 3x20\"  x    Other:    Specific Instructions for next treatment:balance, LE strengthening, steps      Assessment: [x] Progressing toward goals. Initial visit after evaluation, initiated session with cleo to promote ROM. Reqviewed HEP exercised with good carryover noted but with the 3 way hip she was hesistant with using R LE for movement due to the full weight bearing onto LLE. Added band to progress strengthening. Patient required verbal cues to correct forward trunk posture. Added hip flexor stretch to increase hip extension and added balance to work on proprioception of LLE. Patient educated on soreness post therapy.  Patient

## 2023-09-18 ENCOUNTER — HOSPITAL ENCOUNTER (OUTPATIENT)
Dept: PHYSICAL THERAPY | Age: 68
Setting detail: THERAPIES SERIES
Discharge: HOME | End: 2023-09-18
Payer: MEDICARE

## 2023-09-18 NOTE — FLOWSHEET NOTE
[] [address] [facility]  [address]  [place]: [phone]   F: [phone]     Physical Therapy Cancel/No Show note    Date: 2023  Patient: Ankur Copeland  : 1955  MRN: [id number]    Visit Count: 2  Cancels/No Shows to date:     For today's appointment patient:    [x]  Cancelled    [] Rescheduled appointment    [] No-show     Reason given by patient:    [x]  Patient ill    []  Conflicting appointment    [] No transportation      [] Conflict with work    [] No reason given    [] Weather related    [] HAXIE-54    [] Other:      Comments:        [] Next appointment was confirmed      Electronically signed by: Debbie Garcia

## 2023-09-20 ENCOUNTER — OFFICE VISIT (OUTPATIENT)
Dept: ORTHOPEDIC SURGERY | Age: 68
End: 2023-09-20

## 2023-09-20 VITALS — BODY MASS INDEX: 33.79 KG/M2 | HEIGHT: 70 IN | WEIGHT: 236 LBS | RESPIRATION RATE: 14 BRPM

## 2023-09-20 DIAGNOSIS — Z96.652 STATUS POST REVISION OF TOTAL REPLACEMENT OF LEFT KNEE: Primary | ICD-10-CM

## 2023-09-20 PROCEDURE — 99024 POSTOP FOLLOW-UP VISIT: CPT

## 2023-09-20 ASSESSMENT — ENCOUNTER SYMPTOMS: COLOR CHANGE: 0

## 2023-09-20 NOTE — PROGRESS NOTES
[address] [facility] SPORTS MEDICINE  [address] [facility] [address]  [facility] [address]  Dept: [phone]  Dept Fax: [phone]        Postoperative follow-up note    Subjective:   Karla Stone is a 79y.o. year old female who presents to our office today for postoperative followup regarding her   1. Status post revision of total replacement of left knee      Chief Complaint   Patient presents with    Post-Op Check     Left TKA revision DOS. 8/7/23     Alex Coleman is now 6 weeks out from the date of surgery on 8/7/23 and she is doing very post-operatively. She reports having minimal pain and  is no longer taking any narcotics. She also has been doing PT 3x/week and has one session left. She states that overall she is very happy with the surgical outcome. She denies any nausea, vomiting, chest pain, or shortness of breath. She also reports that the incision is well-healed. Review of Systems   Constitutional:  Negative for chills. Musculoskeletal:  Positive for arthralgias and joint swelling. Negative for gait problem. Skin:  Negative for color change and rash. Neurological:  Negative for numbness. I have reviewed the CC, HPI, ROS, PMH, FHX, Social History, and if not present in this note, I have reviewed in the patient's chart. I agree with the documentation provided by other staff and have reviewed their documentation prior to providing my signature indicating agreement. Objective :   General: Karla Stone is a 79 y.o. female who is alert and oriented and sitting comfortably in our office. Ortho Exam  MS: Examination of the left knee demonstrates that the incision is well-healed and there is no erythema or drainage. The knee is moderately edematous. Range of motion of the knee includes 0 to 110 degrees. The knee is stable to varus and valgus stress.   She walks with a heel-to-toe gait pattern using the assistance of a

## 2023-10-16 ENCOUNTER — TRANSCRIBE ORDERS (OUTPATIENT)
Dept: ADMINISTRATIVE | Age: 68
End: 2023-10-16

## 2023-10-16 DIAGNOSIS — M43.17 ACQUIRED SPONDYLOLISTHESIS OF LUMBOSACRAL REGION: Primary | ICD-10-CM

## 2023-10-16 DIAGNOSIS — M54.17 LUMBOSACRAL NEURITIS: ICD-10-CM

## 2023-10-19 ENCOUNTER — HOSPITAL ENCOUNTER (OUTPATIENT)
Dept: GENERAL RADIOLOGY | Facility: CLINIC | Age: 68
Discharge: HOME | End: 2023-10-21
Payer: MEDICARE

## 2023-10-19 ENCOUNTER — HOSPITAL ENCOUNTER (OUTPATIENT)
Facility: CLINIC | Age: 68
Discharge: HOME | End: 2023-10-21
Payer: COMMERCIAL

## 2023-10-19 ENCOUNTER — HOSPITAL ENCOUNTER (OUTPATIENT)
Dept: CT IMAGING | Facility: CLINIC | Age: 68
Discharge: HOME | End: 2023-10-21
Payer: COMMERCIAL

## 2023-10-19 DIAGNOSIS — M54.17 LUMBOSACRAL NEURITIS: ICD-10-CM

## 2023-10-19 DIAGNOSIS — M43.17 ACQUIRED SPONDYLOLISTHESIS OF LUMBOSACRAL REGION: ICD-10-CM

## 2023-10-19 PROCEDURE — 72100 X-RAY EXAM L-S SPINE 2/3 VWS: CPT

## 2023-10-19 PROCEDURE — 72131 CT LUMBAR SPINE W/O DYE: CPT

## 2023-12-15 PROBLEM — R07.9 CHEST PAIN: Status: RESOLVED | Noted: 2022-12-15 | Resolved: 2023-12-15

## 2023-12-26 ENCOUNTER — HOSPITAL ENCOUNTER (OUTPATIENT)
Dept: WOMENS IMAGING | Age: 68
Discharge: HOME | End: 2023-12-28
Payer: MEDICARE

## 2023-12-26 VITALS — WEIGHT: 248 LBS | BODY MASS INDEX: 35.5 KG/M2 | HEIGHT: 70 IN

## 2023-12-26 DIAGNOSIS — Z12.31 SCREENING MAMMOGRAM FOR HIGH-RISK PATIENT: ICD-10-CM

## 2023-12-26 PROCEDURE — 77067 SCR MAMMO BI INCL CAD: CPT

## 2024-06-24 ENCOUNTER — HOSPITAL ENCOUNTER (OUTPATIENT)
Age: 69
Setting detail: SPECIMEN
Discharge: HOME | End: 2024-06-24

## 2024-06-24 DIAGNOSIS — R73.9 HYPERGLYCEMIA: ICD-10-CM

## 2024-06-24 DIAGNOSIS — I10 PRIMARY HYPERTENSION: ICD-10-CM

## 2024-06-24 DIAGNOSIS — E78.5 HYPERLIPIDEMIA, UNSPECIFIED HYPERLIPIDEMIA TYPE: ICD-10-CM

## 2024-06-24 DIAGNOSIS — E03.9 ACQUIRED HYPOTHYROIDISM: ICD-10-CM

## 2024-06-24 LAB
ALBUMIN SERPL-MCNC: 4.2 G/DL (ref 3.5–5.2)
ALBUMIN/GLOB SERPL: 1 {RATIO} (ref 1–2.5)
ALP SERPL-CCNC: 121 U/L (ref 35–104)
ALT SERPL-CCNC: <5 U/L (ref 10–35)
ANION GAP SERPL CALCULATED.3IONS-SCNC: 10 MMOL/L (ref 9–16)
AST SERPL-CCNC: 28 U/L (ref 10–35)
BILIRUB SERPL-MCNC: 0.6 MG/DL (ref 0–1.2)
BUN SERPL-MCNC: 9 MG/DL (ref 8–23)
CALCIUM SERPL-MCNC: 9.3 MG/DL (ref 8.6–10.4)
CHLORIDE SERPL-SCNC: 101 MMOL/L (ref 98–107)
CHOLEST SERPL-MCNC: 162 MG/DL (ref 0–199)
CHOLESTEROL/HDL RATIO: 3
CO2 SERPL-SCNC: 30 MMOL/L (ref 20–31)
CREAT SERPL-MCNC: 1.1 MG/DL (ref 0.5–0.9)
ERYTHROCYTE [DISTWIDTH] IN BLOOD BY AUTOMATED COUNT: 12.9 % (ref 11.8–14.4)
EST. AVERAGE GLUCOSE BLD GHB EST-MCNC: 85 MG/DL
GFR, ESTIMATED: 58 ML/MIN/1.73M2
GLUCOSE SERPL-MCNC: 91 MG/DL (ref 74–99)
HBA1C MFR BLD: 4.6 % (ref 4–6)
HCT VFR BLD AUTO: 51.3 % (ref 36.3–47.1)
HDLC SERPL-MCNC: 50 MG/DL
HGB BLD-MCNC: 16.1 G/DL (ref 11.9–15.1)
LDLC SERPL CALC-MCNC: 90 MG/DL (ref 0–100)
MCH RBC QN AUTO: 31.9 PG (ref 25.2–33.5)
MCHC RBC AUTO-ENTMCNC: 31.4 G/DL (ref 28.4–34.8)
MCV RBC AUTO: 101.6 FL (ref 82.6–102.9)
NRBC BLD-RTO: 0 PER 100 WBC
PLATELET # BLD AUTO: 144 K/UL (ref 138–453)
PMV BLD AUTO: 12.3 FL (ref 8.1–13.5)
POTASSIUM SERPL-SCNC: 4.2 MMOL/L (ref 3.7–5.3)
PROT SERPL-MCNC: 7.5 G/DL (ref 6.6–8.7)
RBC # BLD AUTO: 5.05 M/UL (ref 3.95–5.11)
SODIUM SERPL-SCNC: 141 MMOL/L (ref 136–145)
T4 FREE SERPL-MCNC: 1.3 NG/DL (ref 0.92–1.68)
TRIGL SERPL-MCNC: 110 MG/DL
TSH SERPL DL<=0.05 MIU/L-ACNC: 2.35 UIU/ML (ref 0.27–4.2)
VLDLC SERPL CALC-MCNC: 22 MG/DL
WBC OTHER # BLD: 4.8 K/UL (ref 3.5–11.3)

## 2025-01-07 PROBLEM — D64.9 ANEMIA: Status: ACTIVE | Noted: 2025-01-07

## 2025-01-17 ENCOUNTER — HOSPITAL ENCOUNTER (OUTPATIENT)
Dept: WOMENS IMAGING | Age: 70
Discharge: HOME | End: 2025-01-19
Payer: MEDICARE

## 2025-01-17 DIAGNOSIS — Z78.0 POSTMENOPAUSAL: ICD-10-CM

## 2025-01-17 DIAGNOSIS — Z12.31 ENCOUNTER FOR SCREENING MAMMOGRAM FOR BREAST CANCER: ICD-10-CM

## 2025-01-17 PROCEDURE — 77067 SCR MAMMO BI INCL CAD: CPT

## 2025-01-17 PROCEDURE — 77080 DXA BONE DENSITY AXIAL: CPT

## 2025-02-02 NOTE — ED PROVIDER NOTES
[address] [facility]  Emergency Department Encounter     Pt Name: Libby Guzman  MRN: [id number]  [place] 1955  Date of evaluation: 7/16/22  PCP:  Beulah Gray MD    CHIEF COMPLAINT       Chief Complaint   Patient presents with    Shortness of Breath     Has had a worsening of SOB. HISTORY OF PRESENT ILLNESS  (Location/Symptom, Timing/Onset, Context/Setting, Quality, Duration, Modifying Factors, Severity.)    Libby Guzman is a 74 y.o. female who presents with progressively worsening shortness of breath, cough with brown sputum that is been going on for the past couple of days. She has recently seen pulmonology as an outpatient and did a test at that time and desaturated and needed temporary placement on 4 L nasal cannula oxygen to recover, however is not currently on oxygen at home. She is not a current smoker and never prior smoker, however priorly worked as a . She has not had any fevers chills sweats or body aches. No congestion sore throat runny nose. No chest pain, no leg swelling. Has also seen her family doctor and had an outpatient work-up including echocardiogram.    PAST MEDICAL / SURGICAL / SOCIAL / FAMILY HISTORY    has a past medical history of Chronic back pain, Hypertension, and Hypothyroidism. has a past surgical history that includes Spine surgery (2005); Hysterectomy (2005); Cholecystectomy; and Hip fracture surgery (Right, 06/03/2016). Social History     Socioeconomic History    Marital status:       Spouse name: Not on file    Number of children: Not on file    Years of education: Not on file    Highest education level: Not on file   Occupational History    Not on file   Tobacco Use    Smoking status: Never    Smokeless tobacco: Never   Vaping Use    Vaping Use: Never used   Substance and Sexual Activity    Alcohol use: No    Drug use: No    Sexual activity: Never   Other Topics Concern    Not on file   Social History Narrative    Not on file     Social Determinants of Health     Financial Resource Strain: Not on file   Food Insecurity: Not on file   Transportation Needs: Not on file   Physical Activity: Not on file   Stress: Not on file   Social Connections: Not on file   Intimate Partner Violence: Not on file   Housing Stability: Not on file       Family History   Problem Relation Age of Onset    High Blood Pressure Mother     Other Mother         low heart rate     Pacemaker Mother     Heart Attack Father     Lung Cancer Father     Cancer Sister         throat cancer x2    Breast Cancer Paternal Aunt     Cancer Paternal Aunt         multiple other cancers        Allergies:    Patient has no known allergies. Home Medications:  Prior to Admission medications    Medication Sig Start Date End Date Taking? Authorizing Provider   azithromycin (ZITHROMAX) 250 MG tablet Take 1 tablet by mouth See Admin Instructions for 5 days 500mg on day 1 followed by 250mg on days 2 - 5 7/16/22 7/21/22 Yes Temi Christo Dash, DO   benzonatate (TESSALON PERLES) 100 MG capsule Take 1 capsule by mouth 3 times daily as needed for Cough 7/16/22 7/23/22 Yes Temi Christosuzanna Rice, DO   sulfamethoxazole-trimethoprim (BACTRIM DS) 800-160 MG per tablet Take 1 tablet by mouth 2 times daily for 10 days 7/6/22 7/16/22  Noelle Guerra MD   vitamin D (ERGOCALCIFEROL) 79648 UNITS capsule Take 1 capsule by mouth once a week for 7 doses 6/8/16 7/21/16  Ivonne Watson DO   atenolol (TENORMIN) 25 MG tablet Take 25 mg by mouth daily    Historical Provider, MD   potassium chloride SA (K-DUR;KLOR-CON M) 10 MEQ tablet Take 20 mEq by mouth 2 times daily. Historical Provider, MD   silver sulfADIAZINE (SILVADENE) 1 % cream Apply topically twice a day 5/28/14   Maciel Schwartz MD   levothyroxine (SYNTHROID) 125 MCG tablet Take 125 mcg by mouth Daily. Historical Provider, MD   Tapentadol HCl (NUCYNTA) 75 MG TABS Take 75 mg by mouth 2 times daily.     Historical Provider, MD   pregabalin (LYRICA) 100 MG capsule Take 100 mg by mouth 2 times daily. Historical Provider, MD   Docusate Calcium (STOOL SOFTENER PO) Take 2 capsules by mouth as needed. Historical Provider, MD       REVIEW OF SYSTEMS    (2-9 systems for level 4, 10 or more for level 5)    Review of Systems   Constitutional:  Negative for chills, diaphoresis and fever. HENT:  Negative for congestion, rhinorrhea and sore throat. Respiratory:  Positive for cough and shortness of breath. Cardiovascular:  Negative for chest pain, palpitations and leg swelling. Gastrointestinal:  Negative for abdominal pain. Genitourinary:  Negative for flank pain. Musculoskeletal:  Negative for back pain. Allergic/Immunologic: Negative for environmental allergies. Neurological:  Negative for dizziness, syncope, weakness and light-headedness. Hematological:  Does not bruise/bleed easily. All other systems reviewed and are negative. PHYSICAL EXAM   (up to 7 for level 4, 8 or more for level 5)    VITALS:   Vitals:    07/16/22 1107   BP: 119/69   Pulse: 70   Resp: 16   Temp: 98 °F (36.7 °C)   TempSrc: Oral   SpO2: 100%   Weight: 209 lb (94.8 kg)   Height: 5' 10\" (1.778 m)       Physical Exam  Vitals and nursing note reviewed. Constitutional:       General: She is not in acute distress. Appearance: She is well-developed. She is not diaphoretic. HENT:      Head: Normocephalic and atraumatic. Eyes:      Conjunctiva/sclera: Conjunctivae normal.   Cardiovascular:      Rate and Rhythm: Normal rate and regular rhythm. Heart sounds: Normal heart sounds. Pulmonary:      Effort: Pulmonary effort is normal. No respiratory distress. Breath sounds: Normal breath sounds. No wheezing, rhonchi or rales. Abdominal:      General: There is no distension. Palpations: Abdomen is soft. Tenderness: There is no abdominal tenderness. Musculoskeletal:         General: Normal range of motion. Cervical back: Normal range of motion. Right lower leg: No edema. Left lower leg: No edema. Skin:     General: Skin is warm and dry. Coloration: Skin is not pale. Neurological:      General: No focal deficit present. Mental Status: She is alert.    Psychiatric:         Behavior: Behavior normal.       DIFFERENTIAL  DIAGNOSIS   PLAN (LABS / IMAGING / EKG):  Orders Placed This Encounter   Procedures    XR CHEST 1 VIEW    CT CHEST PULMONARY EMBOLISM W CONTRAST    CBC with Auto Differential    Comprehensive Metabolic Panel w/ Reflex to MG    Troponin    Brain Natriuretic Peptide    Telemetry monitoring - continuous duration    EKG 12 Lead    Insert peripheral IV       MEDICATIONS ORDERED:  Orders Placed This Encounter   Medications    0.9 % sodium chloride bolus    iopamidol (ISOVUE-370) 76 % injection 75 mL    sodium chloride flush 0.9 % injection 10 mL    azithromycin (ZITHROMAX) 250 MG tablet     Sig: Take 1 tablet by mouth See Admin Instructions for 5 days 500mg on day 1 followed by 250mg on days 2 - 5     Dispense:  6 tablet     Refill:  0    benzonatate (TESSALON PERLES) 100 MG capsule     Sig: Take 1 capsule by mouth 3 times daily as needed for Cough     Dispense:  21 capsule     Refill:  0     DIAGNOSTIC RESULTS / EMERGENCYDEPARTMENT COURSE / MDM   LABS:  Labs Reviewed   CBC WITH AUTO DIFFERENTIAL - Abnormal; Notable for the following components:       Result Value    RBC 3.63 (*)     .7 (*)     MCH 39.7 (*)     MCHC 35.2 (*)     RDW 15.6 (*)     Platelets 179 (*)     Seg Neutrophils 84 (*)     Lymphocytes 13 (*)     Monocytes 2 (*)     Eosinophils % 0 (*)     Immature Granulocytes 1 (*)     Absolute Lymph # 0.55 (*)     Absolute Mono # 0.08 (*)     All other components within normal limits   COMPREHENSIVE METABOLIC PANEL W/ REFLEX TO MG FOR LOW K - Abnormal; Notable for the following components:    CREATININE 1.01 (*)     Sodium 134 (*)     Chloride 92 (*)     Alkaline Phosphatase 158 (*)     AST 32 (*)     Total Bilirubin 1.47 (*)     GFR Non- 55 (*)     All other components within normal limits   TROPONIN   BRAIN NATRIURETIC PEPTIDE       RADIOLOGY:  XR CHEST 1 VIEW    Result Date: 7/16/2022  EXAMINATION: ONE XRAY VIEW OF THE CHEST 7/16/2022 9:51 am COMPARISON: 06/02/2016 HISTORY: ORDERING SYSTEM PROVIDED HISTORY: SOB TECHNOLOGIST PROVIDED HISTORY: SOB Reason for Exam: SOB, AP UPRIGHT PORTABLE FINDINGS: There is suboptimal inspiration. The cardiac size is normal. No acute infiltrates or pleural effusions are seen. Pulmonary vascularity appears normal. There is mild ectasia of the thoracic aorta. There are degenerative changes in the spine . No acute bony abnormalities. The hilar structures are normal.     No acute cardiopulmonary disease     CT CHEST PULMONARY EMBOLISM W CONTRAST    Result Date: 7/16/2022  EXAMINATION: CTA OF THE CHEST 7/16/2022 11:48 am TECHNIQUE: CTA of the chest was performed after the administration of intravenous contrast.  Multiplanar reformatted images are provided for review. MIP images are provided for review. Automated exposure control, iterative reconstruction, and/or weight based adjustment of the mA/kV was utilized to reduce the radiation dose to as low as reasonably achievable. COMPARISON: Chest 06/02/2016 HISTORY: ORDERING SYSTEM PROVIDED HISTORY: shortness of breath Decision Support Exception - unselect if not a suspected or confirmed emergency medical condition->Emergency Medical Condition (MA) Reason for Exam: Pt c/o chronic SOB. FINDINGS: Pulmonary Arteries: Pulmonary arteries are adequately opacified for evaluation. No evidence of intraluminal filling defect to suggest pulmonary embolism. Main pulmonary artery is dilated in caliber, 34 mm. Mediastinum: No evidence of mediastinal lymphadenopathy. The heart is enlarged. There is no acute abnormality of the thoracic aorta.   Ascending thoracic aorta 32 mm and descending thoracic aorta 29 mm. Lungs/pleura: No focal consolidation or pulmonary edema. No evidence of pleural effusion or pneumothorax. No significant soft tissue pulmonary nodule evident. A 3 mm or less subpleural soft tissue nodule lateral right middle lobe axial series 4, image 59 is insignificant (benign finding requiring no additional evaluation or follow-up based on Fleischner criteria). A 15 mm ground-glass focus is demonstrated posteroinferior lingula left upper lobe axial series 4, image 59. Upper Abdomen: Limited images of the upper abdomen are unremarkable. Soft Tissues/Bones: No acute bone or soft tissue abnormality. 1.  No acute pulmonary embolism. 2. Nonspecific main pulmonary artery dilatation can be seen with pulmonary hypertension. 3. Cardiomegaly. 4. 15 mm ground-glass pulmonary nodule lingula left upper lobe, versus small focal area of subsegmental atelectasis or postinfectious inflammatory change. Recommend a non-contrast Chest CT at 6 months to confirm persistence, then additional non-contrast Chest CTs every 2 years until 5 years. If nodule grows or develops solid component(s), consider resection. * ______________________________________________________________________________ ____ * These guidelines do not apply to immunocompromised patients and patients with cancer. Follow up in patients with significant comorbidities as clinically warranted. For lung cancer screening, adhere to Lung-RADS guidelines. Reference: Radiology. 2017; 284(1):228-43. RECOMMENDATIONS:      EKG    EKG Interpretation    Interpreted by emergency department physician    Rhythm: normal sinus   Rate: normal  Axis: normal  Ectopy: none  Conduction: 1st degree AV block  ST Segments: depression in  v2, v3, v4, v5, v6, II, and aVf  T Waves: inversion in  III  Q Waves: none    Clinical Impression: non-specific EKG.  No acute changes from EKG in 2016    All EKG's are interpreted by the Emergency Department Physician whoeither signs or Co-signs this chart in the absence of a cardiologist.    EMERGENCY DEPARTMENT COURSE:  ED Course as of 07/16/22 1321   Sat Jul 16, 2022   1158 CBC with Auto Differential(!):    WBC 4.2   RBC 3.63(!)   Hemoglobin Quant 14.4   Hematocrit 40.9   .7(!)   MCH 39.7(!)   MCHC 35.2(!)   RDW 15.6(!)   Platelet Count 122(!)   MPV 10.9   NRBC Automated 0.0   Seg Neutrophils PENDING   Lymphocytes PENDING   Monocytes PENDING   Eosinophils % PENDING   Basophils PENDING   Immature Granulocytes PENDING   Segs Absolute PENDING   Absolute Lymph # PENDING   Absolute Mono # PENDING   Absolute Eos # PENDING   Basophils Absolute PENDING   Absolute Immature Granulocyte PENDING [AO]   1158 Comprehensive Metabolic Panel w/ Reflex to MG(!):    GLUCOSE, FASTING,GF 87   BUN,BUNPL 19   Creatinine 1.01(!)   Bun/Cre Ratio 19   CALCIUM, SERUM, 989675 9.7   Sodium 134(!)   Potassium 3.9   Chloride 92(!)   CO2 25   Anion Gap 17   Alk Phos 158(!)   ALT 14   AST 32(!)   Bilirubin 1.47(!)   Total Protein 8.1   Albumin 4.5   GFR Non- 55(!)   GFR  >60   GFR Comment      [AO]   1158 Troponin:    Troponin, High Sensitivity 9 [AO]   1158 Brain Natriuretic Peptide:    Pro- [AO]   1249 CT CHEST PULMONARY EMBOLISM W CONTRAST [AO]   1305 XR CHEST 1 VIEW [AO]      ED Course User Index  [AO] Temi Sincliar Guess, DO       MDM     Amount and/or Complexity of Data Reviewed  Clinical lab tests: ordered and reviewed  Tests in the radiology section of CPT®: ordered and reviewed  Review and summarize past medical records: yes  Independent visualization of images, tracings, or specimens: yes    Patient Progress  Patient progress: stable      PROCEDURES:  Procedures     CONSULTS:  None    CRITICAL CARE:  NONE    FINAL IMPRESSION     1. Shortness of breath    2. Lung nodule    3.  Cough        DISPOSITION / PLAN   DISPOSITION Decision To Discharge 07/16/2022 01:17:09 PM      Evaluation and treatment course in the ED, and plan of care upon discharge was discussed in length with the patient. Patient had no further questions prior to being discharged and was instructed to return to the ED for new or worsening symptoms. Any changes to existing medications or new prescriptions were reviewed with patient and they expressed understanding of how to correctly take their medications and the possible side effects. PATIENT REFERRED TO:  Makayla Moncada MD  [address] [place] [address] [facility] ED  [address] [facility]  [phone]        DISCHARGE MEDICATIONS:  New Prescriptions    AZITHROMYCIN (ZITHROMAX) 250 MG TABLET    Take 1 tablet by mouth See Admin Instructions for 5 days 500mg on day 1 followed by 250mg on days 2 - 5    BENZONATATE (TESSALON PERLES) 100 MG CAPSULE    Take 1 capsule by mouth 3 times daily as needed for Cough       Temi Haley DO  Emergency Medicine Physician    (Please note that portions of this note were completed with a voice recognition program.  Efforts were made to edit the dictations but occasionally words are mis-transcribed.)        Latasha Jimenez 1721,   07/16/22 1321 99.1

## 2025-06-10 ENCOUNTER — HOSPITAL ENCOUNTER (INPATIENT)
Age: 70
LOS: 4 days | Discharge: HOME | End: 2025-06-14
Attending: EMERGENCY MEDICINE | Admitting: STUDENT IN AN ORGANIZED HEALTH CARE EDUCATION/TRAINING PROGRAM
Payer: MEDICARE

## 2025-06-10 ENCOUNTER — APPOINTMENT (OUTPATIENT)
Dept: CT IMAGING | Age: 70
End: 2025-06-10
Payer: MEDICARE

## 2025-06-10 ENCOUNTER — APPOINTMENT (OUTPATIENT)
Dept: GENERAL RADIOLOGY | Age: 70
End: 2025-06-10
Payer: MEDICARE

## 2025-06-10 DIAGNOSIS — E87.6 HYPOKALEMIA: Primary | ICD-10-CM

## 2025-06-10 DIAGNOSIS — I10 PRIMARY HYPERTENSION: ICD-10-CM

## 2025-06-10 DIAGNOSIS — N17.9 AKI (ACUTE KIDNEY INJURY): ICD-10-CM

## 2025-06-10 LAB
ALBUMIN SERPL-MCNC: 3.6 G/DL (ref 3.5–5.2)
ALBUMIN/GLOB SERPL: 1 {RATIO} (ref 1–2.5)
ALP SERPL-CCNC: 127 U/L (ref 35–104)
ALT SERPL-CCNC: 12 U/L (ref 10–35)
ANION GAP SERPL CALCULATED.3IONS-SCNC: 21 MMOL/L (ref 9–16)
AST SERPL-CCNC: 47 U/L (ref 10–35)
B PARAP IS1001 DNA NPH QL NAA+NON-PROBE: NOT DETECTED
B PERT DNA SPEC QL NAA+PROBE: NOT DETECTED
BACTERIA URNS QL MICRO: ABNORMAL
BASOPHILS # BLD: 0.03 K/UL (ref 0–0.2)
BASOPHILS NFR BLD: 0 % (ref 0–2)
BILIRUB SERPL-MCNC: 1.1 MG/DL (ref 0–1.2)
BILIRUB UR QL STRIP: NEGATIVE
BNP SERPL-MCNC: 493 PG/ML (ref 0–125)
BODY TEMPERATURE: 37
BUN SERPL-MCNC: 8 MG/DL (ref 8–23)
C PNEUM DNA NPH QL NAA+NON-PROBE: NOT DETECTED
CALCIUM SERPL-MCNC: 9.3 MG/DL (ref 8.6–10.4)
CASTS #/AREA URNS LPF: ABNORMAL /LPF (ref 0–8)
CHLORIDE SERPL-SCNC: 88 MMOL/L (ref 98–107)
CLARITY UR: ABNORMAL
CO2 SERPL-SCNC: 30 MMOL/L (ref 20–31)
COHGB MFR BLD: 1.2 % (ref 0–5)
COLOR UR: YELLOW
CREAT SERPL-MCNC: 1.3 MG/DL (ref 0.6–0.9)
EOSINOPHIL # BLD: <0.03 K/UL (ref 0–0.44)
EOSINOPHILS RELATIVE PERCENT: 0 % (ref 1–4)
EPI CELLS #/AREA URNS HPF: ABNORMAL /HPF (ref 0–5)
ERYTHROCYTE [DISTWIDTH] IN BLOOD BY AUTOMATED COUNT: 14 % (ref 11.8–14.4)
FIO2 ON VENT: ABNORMAL %
FLUAV RNA NPH QL NAA+NON-PROBE: NOT DETECTED
FLUBV RNA NPH QL NAA+NON-PROBE: NOT DETECTED
FOLATE SERPL-MCNC: 2.4 NG/ML (ref 4.8–24.2)
GFR, ESTIMATED: 45 ML/MIN/1.73M2
GLUCOSE SERPL-MCNC: 145 MG/DL (ref 74–99)
GLUCOSE UR STRIP-MCNC: NEGATIVE MG/DL
HADV DNA NPH QL NAA+NON-PROBE: NOT DETECTED
HCO3 VENOUS: 32.8 MMOL/L (ref 24–30)
HCOV 229E RNA NPH QL NAA+NON-PROBE: NOT DETECTED
HCOV HKU1 RNA NPH QL NAA+NON-PROBE: NOT DETECTED
HCOV NL63 RNA NPH QL NAA+NON-PROBE: NOT DETECTED
HCOV OC43 RNA NPH QL NAA+NON-PROBE: NOT DETECTED
HCT VFR BLD AUTO: 50.7 % (ref 36.3–47.1)
HGB BLD-MCNC: 17.7 G/DL (ref 11.9–15.1)
HGB UR QL STRIP.AUTO: NEGATIVE
HMPV RNA NPH QL NAA+NON-PROBE: NOT DETECTED
HPIV1 RNA NPH QL NAA+NON-PROBE: NOT DETECTED
HPIV2 RNA NPH QL NAA+NON-PROBE: NOT DETECTED
HPIV3 RNA NPH QL NAA+NON-PROBE: NOT DETECTED
HPIV4 RNA NPH QL NAA+NON-PROBE: NOT DETECTED
IMM GRANULOCYTES # BLD AUTO: 0.05 K/UL (ref 0–0.3)
IMM GRANULOCYTES NFR BLD: 1 %
KETONES UR STRIP-MCNC: NEGATIVE MG/DL
LACTIC ACID, WHOLE BLOOD: 3.6 MMOL/L (ref 0.7–2.1)
LEUKOCYTE ESTERASE UR QL STRIP: ABNORMAL
LIPASE SERPL-CCNC: 32 U/L (ref 13–60)
LIPASE SERPL-CCNC: 36 U/L (ref 13–60)
LYMPHOCYTES NFR BLD: 1.09 K/UL (ref 1.1–3.7)
LYMPHOCYTES RELATIVE PERCENT: 15 % (ref 24–43)
M PNEUMO DNA NPH QL NAA+NON-PROBE: NOT DETECTED
MAGNESIUM SERPL-MCNC: 1.6 MG/DL (ref 1.6–2.4)
MCH RBC QN AUTO: 35.8 PG (ref 25.2–33.5)
MCHC RBC AUTO-ENTMCNC: 34.9 G/DL (ref 28.4–34.8)
MCV RBC AUTO: 102.6 FL (ref 82.6–102.9)
MONOCYTES NFR BLD: 0.45 K/UL (ref 0.1–1.2)
MONOCYTES NFR BLD: 6 % (ref 3–12)
NEUTROPHILS NFR BLD: 78 % (ref 36–65)
NEUTS SEG NFR BLD: 5.73 K/UL (ref 1.5–8.1)
NITRITE UR QL STRIP: POSITIVE
NRBC BLD-RTO: 0 PER 100 WBC
O2 SAT, VEN: 48.8 % (ref 60–85)
PCO2 VENOUS: 56.8 MM HG (ref 39–55)
PH UR STRIP: 6 [PH] (ref 5–8)
PH VENOUS: 7.38 (ref 7.32–7.42)
PHOSPHATE SERPL-MCNC: 2 MG/DL (ref 2.5–4.5)
PLATELET # BLD AUTO: 182 K/UL (ref 138–453)
PMV BLD AUTO: 11.6 FL (ref 8.1–13.5)
PO2 VENOUS: 27.5 MM HG (ref 30–50)
POSITIVE BASE EXCESS, VEN: 5.5 MMOL/L (ref 0–2)
POTASSIUM SERPL-SCNC: 2.3 MMOL/L (ref 3.7–5.3)
PROT SERPL-MCNC: 7.2 G/DL (ref 6.6–8.7)
PROT UR STRIP-MCNC: NEGATIVE MG/DL
RBC # BLD AUTO: 4.94 M/UL (ref 3.95–5.11)
RBC #/AREA URNS HPF: ABNORMAL /HPF (ref 0–4)
RSV RNA NPH QL NAA+NON-PROBE: NOT DETECTED
RV+EV RNA NPH QL NAA+NON-PROBE: NOT DETECTED
SARS-COV-2 RNA NPH QL NAA+NON-PROBE: NOT DETECTED
SODIUM SERPL-SCNC: 139 MMOL/L (ref 136–145)
SP GR UR STRIP: 1.05 (ref 1–1.03)
SPECIMEN DESCRIPTION: NORMAL
T4 FREE SERPL-MCNC: 2.7 NG/DL (ref 0.92–1.68)
TROPONIN I SERPL HS-MCNC: 20 NG/L (ref 0–14)
TROPONIN I SERPL HS-MCNC: 21 NG/L (ref 0–14)
TSH SERPL DL<=0.05 MIU/L-ACNC: 0.04 UIU/ML (ref 0.27–4.2)
UROBILINOGEN UR STRIP-ACNC: NORMAL EU/DL (ref 0–1)
VIT B12 SERPL-MCNC: 268 PG/ML (ref 232–1245)
WBC #/AREA URNS HPF: ABNORMAL /HPF (ref 0–5)
WBC OTHER # BLD: 7.4 K/UL (ref 3.5–11.3)

## 2025-06-10 PROCEDURE — 1200000000 HC SEMI PRIVATE

## 2025-06-10 PROCEDURE — 80053 COMPREHEN METABOLIC PANEL: CPT

## 2025-06-10 PROCEDURE — 36415 COLL VENOUS BLD VENIPUNCTURE: CPT

## 2025-06-10 PROCEDURE — 71260 CT THORAX DX C+: CPT

## 2025-06-10 PROCEDURE — 2500000003 HC RX 250 WO HCPCS

## 2025-06-10 PROCEDURE — 82607 VITAMIN B-12: CPT

## 2025-06-10 PROCEDURE — 6360000002 HC RX W HCPCS: Performed by: NURSE PRACTITIONER

## 2025-06-10 PROCEDURE — 2500000003 HC RX 250 WO HCPCS: Performed by: INTERNAL MEDICINE

## 2025-06-10 PROCEDURE — 82746 ASSAY OF FOLIC ACID SERUM: CPT

## 2025-06-10 PROCEDURE — 6360000002 HC RX W HCPCS: Performed by: INTERNAL MEDICINE

## 2025-06-10 PROCEDURE — 51798 US URINE CAPACITY MEASURE: CPT

## 2025-06-10 PROCEDURE — 84439 ASSAY OF FREE THYROXINE: CPT

## 2025-06-10 PROCEDURE — 87086 URINE CULTURE/COLONY COUNT: CPT

## 2025-06-10 PROCEDURE — 6360000002 HC RX W HCPCS

## 2025-06-10 PROCEDURE — 99223 1ST HOSP IP/OBS HIGH 75: CPT | Performed by: INTERNAL MEDICINE

## 2025-06-10 PROCEDURE — 82805 BLOOD GASES W/O2 SATURATION: CPT

## 2025-06-10 PROCEDURE — 6370000000 HC RX 637 (ALT 250 FOR IP)

## 2025-06-10 PROCEDURE — 84132 ASSAY OF SERUM POTASSIUM: CPT

## 2025-06-10 PROCEDURE — 93005 ELECTROCARDIOGRAM TRACING: CPT

## 2025-06-10 PROCEDURE — 96375 TX/PRO/DX INJ NEW DRUG ADDON: CPT

## 2025-06-10 PROCEDURE — 0202U NFCT DS 22 TRGT SARS-COV-2: CPT

## 2025-06-10 PROCEDURE — 85025 COMPLETE CBC W/AUTO DIFF WBC: CPT

## 2025-06-10 PROCEDURE — 2580000003 HC RX 258

## 2025-06-10 PROCEDURE — 84484 ASSAY OF TROPONIN QUANT: CPT

## 2025-06-10 PROCEDURE — 99285 EMERGENCY DEPT VISIT HI MDM: CPT

## 2025-06-10 PROCEDURE — 83735 ASSAY OF MAGNESIUM: CPT

## 2025-06-10 PROCEDURE — 74177 CT ABD & PELVIS W/CONTRAST: CPT

## 2025-06-10 PROCEDURE — 83690 ASSAY OF LIPASE: CPT

## 2025-06-10 PROCEDURE — 6370000000 HC RX 637 (ALT 250 FOR IP): Performed by: INTERNAL MEDICINE

## 2025-06-10 PROCEDURE — 81001 URINALYSIS AUTO W/SCOPE: CPT

## 2025-06-10 PROCEDURE — 96365 THER/PROPH/DIAG IV INF INIT: CPT

## 2025-06-10 PROCEDURE — 6360000004 HC RX CONTRAST MEDICATION

## 2025-06-10 PROCEDURE — 87040 BLOOD CULTURE FOR BACTERIA: CPT

## 2025-06-10 PROCEDURE — 83605 ASSAY OF LACTIC ACID: CPT

## 2025-06-10 PROCEDURE — 96376 TX/PRO/DX INJ SAME DRUG ADON: CPT

## 2025-06-10 PROCEDURE — 84443 ASSAY THYROID STIM HORMONE: CPT

## 2025-06-10 PROCEDURE — 71046 X-RAY EXAM CHEST 2 VIEWS: CPT

## 2025-06-10 PROCEDURE — 83880 ASSAY OF NATRIURETIC PEPTIDE: CPT

## 2025-06-10 PROCEDURE — 84100 ASSAY OF PHOSPHORUS: CPT

## 2025-06-10 RX ORDER — ENOXAPARIN SODIUM 100 MG/ML
30 INJECTION SUBCUTANEOUS 2 TIMES DAILY
Status: DISCONTINUED | OUTPATIENT
Start: 2025-06-10 | End: 2025-06-14 | Stop reason: HOSPADM

## 2025-06-10 RX ORDER — LORAZEPAM 2 MG/ML
0.5 INJECTION INTRAMUSCULAR PRN
Status: DISCONTINUED | OUTPATIENT
Start: 2025-06-10 | End: 2025-06-11

## 2025-06-10 RX ORDER — IOPAMIDOL 755 MG/ML
75 INJECTION, SOLUTION INTRAVASCULAR
Status: COMPLETED | OUTPATIENT
Start: 2025-06-10 | End: 2025-06-10

## 2025-06-10 RX ORDER — ACETAMINOPHEN 500 MG
1000 TABLET ORAL ONCE
Status: COMPLETED | OUTPATIENT
Start: 2025-06-10 | End: 2025-06-10

## 2025-06-10 RX ORDER — SODIUM CHLORIDE 0.9 % (FLUSH) 0.9 %
10 SYRINGE (ML) INJECTION PRN
Status: DISCONTINUED | OUTPATIENT
Start: 2025-06-10 | End: 2025-06-14 | Stop reason: HOSPADM

## 2025-06-10 RX ORDER — POTASSIUM CHLORIDE 7.45 MG/ML
10 INJECTION INTRAVENOUS
Status: DISPENSED | OUTPATIENT
Start: 2025-06-10 | End: 2025-06-10

## 2025-06-10 RX ORDER — FOLIC ACID 1 MG/1
1 TABLET ORAL DAILY
Status: DISCONTINUED | OUTPATIENT
Start: 2025-06-11 | End: 2025-06-14 | Stop reason: HOSPADM

## 2025-06-10 RX ORDER — POTASSIUM CHLORIDE 1500 MG/1
40 TABLET, EXTENDED RELEASE ORAL PRN
Status: DISCONTINUED | OUTPATIENT
Start: 2025-06-10 | End: 2025-06-14 | Stop reason: HOSPADM

## 2025-06-10 RX ORDER — SODIUM CHLORIDE 9 MG/ML
INJECTION, SOLUTION INTRAVENOUS PRN
Status: DISCONTINUED | OUTPATIENT
Start: 2025-06-10 | End: 2025-06-14 | Stop reason: HOSPADM

## 2025-06-10 RX ORDER — TAMSULOSIN HYDROCHLORIDE 0.4 MG/1
0.4 CAPSULE ORAL DAILY
Status: DISCONTINUED | OUTPATIENT
Start: 2025-06-10 | End: 2025-06-14 | Stop reason: HOSPADM

## 2025-06-10 RX ORDER — MULTIVITAMIN WITH IRON
1 TABLET ORAL DAILY
Status: DISCONTINUED | OUTPATIENT
Start: 2025-06-11 | End: 2025-06-14 | Stop reason: HOSPADM

## 2025-06-10 RX ORDER — LEVOTHYROXINE SODIUM 112 UG/1
112 TABLET ORAL DAILY
Status: DISCONTINUED | OUTPATIENT
Start: 2025-06-10 | End: 2025-06-14 | Stop reason: HOSPADM

## 2025-06-10 RX ORDER — MAGNESIUM SULFATE 1 G/100ML
1000 INJECTION INTRAVENOUS PRN
Status: DISCONTINUED | OUTPATIENT
Start: 2025-06-10 | End: 2025-06-14 | Stop reason: HOSPADM

## 2025-06-10 RX ORDER — SODIUM CHLORIDE AND POTASSIUM CHLORIDE 150; 450 MG/100ML; MG/100ML
INJECTION, SOLUTION INTRAVENOUS CONTINUOUS
Status: DISCONTINUED | OUTPATIENT
Start: 2025-06-10 | End: 2025-06-10

## 2025-06-10 RX ORDER — ONDANSETRON 2 MG/ML
4 INJECTION INTRAMUSCULAR; INTRAVENOUS ONCE
Status: COMPLETED | OUTPATIENT
Start: 2025-06-10 | End: 2025-06-10

## 2025-06-10 RX ORDER — ACETAMINOPHEN 650 MG/1
650 SUPPOSITORY RECTAL EVERY 6 HOURS PRN
Status: DISCONTINUED | OUTPATIENT
Start: 2025-06-10 | End: 2025-06-14 | Stop reason: HOSPADM

## 2025-06-10 RX ORDER — IBUPROFEN 400 MG/1
400 TABLET, FILM COATED ORAL ONCE
Status: COMPLETED | OUTPATIENT
Start: 2025-06-10 | End: 2025-06-10

## 2025-06-10 RX ORDER — 0.9 % SODIUM CHLORIDE 0.9 %
1000 INTRAVENOUS SOLUTION INTRAVENOUS ONCE
Status: COMPLETED | OUTPATIENT
Start: 2025-06-10 | End: 2025-06-10

## 2025-06-10 RX ORDER — POTASSIUM CHLORIDE 7.45 MG/ML
10 INJECTION INTRAVENOUS
Status: DISCONTINUED | OUTPATIENT
Start: 2025-06-10 | End: 2025-06-10

## 2025-06-10 RX ORDER — GABAPENTIN 300 MG/1
300 CAPSULE ORAL 3 TIMES DAILY
Status: DISCONTINUED | OUTPATIENT
Start: 2025-06-10 | End: 2025-06-14 | Stop reason: HOSPADM

## 2025-06-10 RX ORDER — MULTIVITAMIN WITH IRON
1000 TABLET ORAL DAILY
Status: DISCONTINUED | OUTPATIENT
Start: 2025-06-11 | End: 2025-06-14 | Stop reason: HOSPADM

## 2025-06-10 RX ORDER — ONDANSETRON 2 MG/ML
4 INJECTION INTRAMUSCULAR; INTRAVENOUS EVERY 6 HOURS PRN
Status: DISCONTINUED | OUTPATIENT
Start: 2025-06-10 | End: 2025-06-14 | Stop reason: HOSPADM

## 2025-06-10 RX ORDER — SODIUM CHLORIDE 0.9 % (FLUSH) 0.9 %
5-40 SYRINGE (ML) INJECTION EVERY 12 HOURS SCHEDULED
Status: DISCONTINUED | OUTPATIENT
Start: 2025-06-10 | End: 2025-06-14 | Stop reason: HOSPADM

## 2025-06-10 RX ORDER — TAMSULOSIN HYDROCHLORIDE 0.4 MG/1
0.4 CAPSULE ORAL DAILY
Status: DISCONTINUED | OUTPATIENT
Start: 2025-06-11 | End: 2025-06-10

## 2025-06-10 RX ORDER — ONDANSETRON 4 MG/1
4 TABLET, ORALLY DISINTEGRATING ORAL EVERY 8 HOURS PRN
Status: DISCONTINUED | OUTPATIENT
Start: 2025-06-10 | End: 2025-06-14 | Stop reason: HOSPADM

## 2025-06-10 RX ORDER — ACETAMINOPHEN 325 MG/1
650 TABLET ORAL EVERY 6 HOURS PRN
Status: DISCONTINUED | OUTPATIENT
Start: 2025-06-10 | End: 2025-06-14 | Stop reason: HOSPADM

## 2025-06-10 RX ORDER — LORAZEPAM 2 MG/ML
0.5 INJECTION INTRAMUSCULAR ONCE
Status: COMPLETED | OUTPATIENT
Start: 2025-06-10 | End: 2025-06-10

## 2025-06-10 RX ORDER — POTASSIUM CHLORIDE 7.45 MG/ML
10 INJECTION INTRAVENOUS PRN
Status: DISCONTINUED | OUTPATIENT
Start: 2025-06-10 | End: 2025-06-14 | Stop reason: HOSPADM

## 2025-06-10 RX ORDER — 0.9 % SODIUM CHLORIDE 0.9 %
1000 INTRAVENOUS SOLUTION INTRAVENOUS ONCE
Status: COMPLETED | OUTPATIENT
Start: 2025-06-10 | End: 2025-06-11

## 2025-06-10 RX ORDER — POLYETHYLENE GLYCOL 3350 17 G/17G
17 POWDER, FOR SOLUTION ORAL DAILY PRN
Status: DISCONTINUED | OUTPATIENT
Start: 2025-06-10 | End: 2025-06-14 | Stop reason: HOSPADM

## 2025-06-10 RX ORDER — SODIUM CHLORIDE AND POTASSIUM CHLORIDE 150; 900 MG/100ML; MG/100ML
INJECTION, SOLUTION INTRAVENOUS CONTINUOUS
Status: DISCONTINUED | OUTPATIENT
Start: 2025-06-10 | End: 2025-06-13

## 2025-06-10 RX ADMIN — POTASSIUM CHLORIDE 10 MEQ: 7.45 INJECTION INTRAVENOUS at 21:02

## 2025-06-10 RX ADMIN — IBUPROFEN 400 MG: 400 TABLET, FILM COATED ORAL at 16:22

## 2025-06-10 RX ADMIN — IOPAMIDOL 75 ML: 755 INJECTION, SOLUTION INTRAVENOUS at 17:37

## 2025-06-10 RX ADMIN — TAMSULOSIN HYDROCHLORIDE 0.4 MG: 0.4 CAPSULE ORAL at 22:54

## 2025-06-10 RX ADMIN — IOPAMIDOL 75 ML: 755 INJECTION, SOLUTION INTRAVENOUS at 20:10

## 2025-06-10 RX ADMIN — POTASSIUM CHLORIDE 10 MEQ: 7.45 INJECTION INTRAVENOUS at 19:53

## 2025-06-10 RX ADMIN — POTASSIUM BICARBONATE 40 MEQ: 782 TABLET, EFFERVESCENT ORAL at 17:34

## 2025-06-10 RX ADMIN — SODIUM CHLORIDE, PRESERVATIVE FREE 20 MG: 5 INJECTION INTRAVENOUS at 22:47

## 2025-06-10 RX ADMIN — POTASSIUM CHLORIDE 10 MEQ: 7.45 INJECTION INTRAVENOUS at 18:50

## 2025-06-10 RX ADMIN — SODIUM CHLORIDE 1000 ML: 0.9 INJECTION, SOLUTION INTRAVENOUS at 16:22

## 2025-06-10 RX ADMIN — POTASSIUM CHLORIDE 10 MEQ: 7.46 INJECTION, SOLUTION INTRAVENOUS at 17:46

## 2025-06-10 RX ADMIN — SODIUM CHLORIDE, PRESERVATIVE FREE 10 ML: 5 INJECTION INTRAVENOUS at 21:30

## 2025-06-10 RX ADMIN — POTASSIUM CHLORIDE 10 MEQ: 7.45 INJECTION INTRAVENOUS at 23:14

## 2025-06-10 RX ADMIN — ACETAMINOPHEN 1000 MG: 500 TABLET, FILM COATED ORAL at 16:22

## 2025-06-10 RX ADMIN — Medication 0.5 MG: at 22:48

## 2025-06-10 RX ADMIN — ENOXAPARIN SODIUM 30 MG: 100 INJECTION SUBCUTANEOUS at 22:47

## 2025-06-10 RX ADMIN — GABAPENTIN 300 MG: 300 CAPSULE ORAL at 22:46

## 2025-06-10 RX ADMIN — ONDANSETRON 4 MG: 2 INJECTION, SOLUTION INTRAMUSCULAR; INTRAVENOUS at 16:22

## 2025-06-10 RX ADMIN — POTASSIUM CHLORIDE 10 MEQ: 7.45 INJECTION INTRAVENOUS at 17:58

## 2025-06-10 RX ADMIN — WATER 1000 MG: 1 INJECTION INTRAMUSCULAR; INTRAVENOUS; SUBCUTANEOUS at 18:21

## 2025-06-10 ASSESSMENT — LIFESTYLE VARIABLES
HOW MANY STANDARD DRINKS CONTAINING ALCOHOL DO YOU HAVE ON A TYPICAL DAY: PATIENT DOES NOT DRINK
HOW OFTEN DO YOU HAVE A DRINK CONTAINING ALCOHOL: NEVER

## 2025-06-10 NOTE — ED PROVIDER NOTES
Handoff taken on the following patient from prior Attending Physician:    6:12 PM EDT    Pt Name: Samantha Hobson    PCP:  Eladia Arnett, MARIA M - ROBIN         Attestation    I was available and discussed any additional care issues that arose and coordinated the management plans with the resident(s) caring for the patient during my duty period. Any areas of disagreement with resident’s documentation of care or procedures are noted on the chart. I was personally present for the key portions of any/all procedures during my duty period. I have documented in the chart those procedures where I was not present during the key portions.    Patient is a 70-year-old female weakness fatigue hyperkalemia hyponatremia admitted     Patrick Flowers DO  06/10/25 1869

## 2025-06-10 NOTE — ED NOTES
ED to inpatient nurses report      Chief Complaint:  Chief Complaint   Patient presents with    Shortness of Breath    Vomiting    Dizziness     Present to ED from: Home    MOA:     LOC: alert and orientated to name, place, date  Mobility: Requires assistance * 1  Oxygen Baseline: 96%    Current needs required: 2L/min via nasal cannula   Pending ED orders: none  Present condition: stable    Why did the patient come to the ED? Shortness of breath, Dizziness  What is the plan? admission  Any procedures or intervention occur? Potassium infusion  Any safety concerns?? Fall risk    Mental Status:  Level of Consciousness: Alert (0)    Psych Assessment:   Psychosocial  Psychosocial (WDL): Within Defined Limits  Vital signs   Vitals:    06/10/25 1812 06/10/25 1830 06/10/25 1837 06/10/25 1900   BP:  122/73     Pulse: 87 87 93    Resp: 13 13 21    Temp:       SpO2: 100%  92%    Weight:    102.1 kg (225 lb)        Vitals:  Patient Vitals for the past 24 hrs:   BP Temp Pulse Resp SpO2 Weight   06/10/25 1900 -- -- -- -- -- 102.1 kg (225 lb)   06/10/25 1837 -- -- 93 21 92 % --   06/10/25 1830 122/73 -- 87 13 -- --   06/10/25 1812 -- -- 87 13 100 % --   06/10/25 1749 123/87 -- 97 20 -- --   06/10/25 1747 -- -- 89 10 100 % --   06/10/25 1610 -- -- (!) 104 14 92 % --   06/10/25 1607 -- -- (!) 111 13 98 % --   06/10/25 1516 (!) 135/93 97.5 °F (36.4 °C) (!) 108 20 96 % --      Visit Vitals  /73   Pulse 93   Temp 97.5 °F (36.4 °C)   Resp 21   Wt 102.1 kg (225 lb)   SpO2 92%   BMI 32.28 kg/m²        LDAs:   Peripheral IV 06/10/25 Distal;Right Forearm (Active)   Site Assessment Clean, dry & intact 06/10/25 1622       Ambulatory Status:  Presents to emergency department  because of falls (Syncope, seizure, or loss of consciousness): No, Age > 70: No, Altered Mental Status, Intoxication with alcohol or substance confusion (Disorientation, impaired judgment, poor safety awaremess, or inability to follow instructions): No, Impaired

## 2025-06-10 NOTE — ED PROVIDER NOTES
[facility]     Emergency Department     Faculty Attestation    I performed a history and physical examination of the patient and discussed management with the resident. I reviewed the resident's note and agree with the documented findings and plan of care. Any areas of disagreement are noted on the chart. I was personally present for the key portions of any procedures. I have documented in the chart those procedures where I was not present during the key portions. I have reviewed the emergency nurses triage note. I agree with the chief complaint, past medical history, past surgical history, allergies, medications, social and family history as documented unless otherwise noted below. For Physician Assistant/ Nurse Practitioner cases/documentation I have personally evaluated this patient and have completed at least one if not all key elements of the E/M (history, physical exam, and MDM). Additional findings are as noted.    Patient has multiple complaints but the main thing that brought her to the Emergency Department today was increasing shortness of breath and dyspnea on exertion.  Patient is complaining of tingling in both lower extremities which occurred after spinal anesthesia done several months ago, patient is tachycardic with frequent PVCs on the monitor.  On exam she is tachycardic without murmurs, she is awake and alert in no distress, equal breath sounds, abdomen is benign, no lower extremity pain or swelling on examination with good muscle strength in the extremities.       EKG Interpretation    Interpreted by emergency department physician    Rhythm: normal sinus   Rate: 120  Axis: normal/0  Ectopy: PVCs  Conduction: normal  Inferior ST and T wave changes  Q Waves: Lateral    Clinical Impression: Abnormal EKG    Edmond Woods, Edmond Flores MD  06/10/25 1649    Consider leptospirosis in the differential..       Edmond Woods MD  06/10/25 0161

## 2025-06-10 NOTE — ED NOTES
Pt to ed with daughter at bedside with complaints of shortness of breath, nausea, dizziness ongoing for the past 2 weeks  Pt states she has been dealing with the sob but got worse today  Pt denies hx of asthma or copd  Pt denies wearing oxygen at home  Pt denies being a smoker  Pt denies any known exposure to anyone sick that she knows of  Pt denies any chest pain  Pt states nausea with no emesis  Pt arrived tachycardic  Per family at bedside, pts house is infested with rats and hasn't been properly cleaned yet  Pt placed on cont cardiac monitor, ekg completed, iv established, labs drawn, labeled and will send to lab via orders  Pt alert and oriented x4, talking in complete sentences, respirations even and unlabored. Pt acting age appropriate. White board updated, will continue to plan of care

## 2025-06-10 NOTE — FLOWSHEET NOTE
06/10/25 1812   Oxygen Therapy   SpO2 100 %   Pulse via Oximetry 88 beats per minute   O2 Device Nasal cannula   O2 Flow Rate (L/min) 2 L/min

## 2025-06-10 NOTE — ED PROVIDER NOTES
[facility] EMERGENCY DEPARTMENT  Emergency Department Encounter  Emergency Medicine Resident     Pt Name:Samantha Hobson  MRN: [id number]  Birthdate 1955  Date of evaluation: 6/10/25  PCP:  Eladia Arnett APRN - CNP  Note Started: 4:02 PM EDT      CHIEF COMPLAINT       Chief Complaint   Patient presents with    Shortness of Breath    Vomiting    Dizziness       HISTORY OF PRESENT ILLNESS  (Location/Symptom, Timing/Onset, Context/Setting, Quality, Duration, Modifying Factors, Severity.)      Samantha Hobson is a 70 y.o. female who presents with shortness of breath, dizziness, nausea, vomiting started couple days ago.  Patient stated that she started vomiting 4 days ago, but she is still feeling nauseous and retching the whole time, patient states she has a history of hypertension and she feels dizzy when she is standing up, patient states that her house is full of rats and she is not able to get rid of them, patient stated she has been having pins-and-needles sensation in her feet, hands, lips.  Patient denies chest pain, abdominal pain, diarrhea.  Patient states that she has epidural injection recently and she started having the pins and needle sensation in her both legs    PAST MEDICAL / SURGICAL / SOCIAL / FAMILY HISTORY      has a past medical history of Chronic back pain, Hypertension, Hypothyroidism, and Osteoarthritis.       has a past surgical history that includes Spine surgery (01/01/2005); Hysterectomy (01/01/2005); Cholecystectomy; Hip fracture surgery (Right, 06/03/2016); eye surgery (Bilateral, 2002); joint replacement (Bilateral); Revision total knee arthroplasty (Left, 04/25/2023); IR INSERT PICC VAD W SQ PORT >5 YEARS (04/26/2023); and Revision total knee arthroplasty (Left, 8/7/2023).      Social History     Socioeconomic History    Marital status:      Spouse name: Not on file    Number of children: Not on file    Years of education: Not on file    Highest education level: Not  Creatinine(!): 1.3 [MS]   1726 Est, Glom Filt Rate(!): 45 [MS]   1759 Talked with the infectious disease team, they recommended ceftriaxone, blood culture,  [MS]   1803 BP: 123/87 [MS]   1803 Pulse: 97 [MS]   1803 Respirations: 20 [MS]   1825 Troponin, High Sensitivity(!): 20 [MS]   1924 Culture: NO GROWTH <24 HRS [MS]   1924 CT CHEST PULMONARY EMBOLISM W CONTRAST   No pulmonary embolism.  2. Mild reticular and ground-glass opacities diffusely with no focal  consolidation. This may represent mild pulmonary edema or atypical infection.   [MS]   2001 Talked with the intermed team, they recommended CT abdomen, patient got accepted [MS]      ED Course User Index  [MS] Jordan Aguilar MD       PROCEDURES:      CONSULTS:  IP CONSULT TO INFECTIOUS DISEASES  IP CONSULT TO HOSPITALIST    CRITICAL CARE:  There was significant risk of life threatening deterioration of patient's condition requiring my direct management. Critical care time 0 minutes, excluding any documented procedures.    FINAL IMPRESSION      1. Hypokalemia    2. RADHA (acute kidney injury)          DISPOSITION / PLAN     DISPOSITION Decision To Admit 06/10/2025 05:33:16 PM   DISPOSITION CONDITION Stable           PATIENT REFERRED TO:  No follow-up provider specified.    DISCHARGE MEDICATIONS:  New Prescriptions    No medications on file       Jordan Aguilar MD  Emergency Medicine Resident    (Please note that portions of thisnote were completed with a voice recognition program.  Efforts were made to edit the dictations but occasionally words are mis-transcribed.)

## 2025-06-11 ENCOUNTER — APPOINTMENT (OUTPATIENT)
Dept: MRI IMAGING | Age: 70
End: 2025-06-11
Payer: MEDICARE

## 2025-06-11 PROBLEM — R41.89 COGNITIVE IMPAIRMENT: Status: ACTIVE | Noted: 2025-06-11

## 2025-06-11 PROBLEM — N17.9 AKI (ACUTE KIDNEY INJURY): Status: ACTIVE | Noted: 2025-06-11

## 2025-06-11 PROBLEM — I95.1 ORTHOSTATIC HYPOTENSION: Status: ACTIVE | Noted: 2025-06-11

## 2025-06-11 PROBLEM — G62.9 NEUROPATHY: Status: ACTIVE | Noted: 2025-06-11

## 2025-06-11 PROBLEM — R82.90 ABNORMAL URINE: Status: ACTIVE | Noted: 2025-06-11

## 2025-06-11 PROBLEM — R74.01 TRANSAMINITIS: Status: ACTIVE | Noted: 2025-06-11

## 2025-06-11 LAB
ALBUMIN SERPL-MCNC: 2.6 G/DL (ref 3.5–5.2)
ALBUMIN/GLOB SERPL: 1 {RATIO} (ref 1–2.5)
ALP SERPL-CCNC: 86 U/L (ref 35–104)
ALT SERPL-CCNC: 8 U/L (ref 10–35)
ANION GAP SERPL CALCULATED.3IONS-SCNC: 11 MMOL/L (ref 9–16)
AST SERPL-CCNC: 39 U/L (ref 10–35)
BACTERIA URNS QL MICRO: NORMAL
BASOPHILS # BLD: <0.03 K/UL (ref 0–0.2)
BASOPHILS NFR BLD: 0 % (ref 0–2)
BILIRUB DIRECT SERPL-MCNC: 0.3 MG/DL (ref 0–0.2)
BILIRUB INDIRECT SERPL-MCNC: 0.5 MG/DL (ref 0–1)
BILIRUB SERPL-MCNC: 0.8 MG/DL (ref 0–1.2)
BILIRUB UR QL STRIP: NEGATIVE
BUN SERPL-MCNC: 5 MG/DL (ref 8–23)
CALCIUM SERPL-MCNC: 7.9 MG/DL (ref 8.6–10.4)
CASTS #/AREA URNS LPF: NORMAL /LPF (ref 0–8)
CHLORIDE SERPL-SCNC: 100 MMOL/L (ref 98–107)
CLARITY UR: ABNORMAL
CO2 SERPL-SCNC: 26 MMOL/L (ref 20–31)
COLOR UR: YELLOW
CREAT SERPL-MCNC: 1 MG/DL (ref 0.6–0.9)
CRP SERPL HS-MCNC: <3 MG/L (ref 0–5)
EKG ATRIAL RATE: 120 BPM
EKG P AXIS: 26 DEGREES
EKG P-R INTERVAL: 184 MS
EKG Q-T INTERVAL: 398 MS
EKG QRS DURATION: 80 MS
EKG QTC CALCULATION (BAZETT): 562 MS
EKG R AXIS: 0 DEGREES
EKG T AXIS: 188 DEGREES
EKG VENTRICULAR RATE: 120 BPM
EOSINOPHIL # BLD: <0.03 K/UL (ref 0–0.44)
EOSINOPHILS RELATIVE PERCENT: 0 % (ref 1–4)
EPI CELLS #/AREA URNS HPF: NORMAL /HPF (ref 0–5)
ERYTHROCYTE [DISTWIDTH] IN BLOOD BY AUTOMATED COUNT: 14.2 % (ref 11.8–14.4)
GFR, ESTIMATED: 61 ML/MIN/1.73M2
GLOBULIN SER CALC-MCNC: 2.6 G/DL
GLUCOSE SERPL-MCNC: 72 MG/DL (ref 74–99)
GLUCOSE UR STRIP-MCNC: NEGATIVE MG/DL
HCT VFR BLD AUTO: 43.4 % (ref 36.3–47.1)
HGB BLD-MCNC: 14.4 G/DL (ref 11.9–15.1)
HGB UR QL STRIP.AUTO: ABNORMAL
IMM GRANULOCYTES # BLD AUTO: <0.03 K/UL (ref 0–0.3)
IMM GRANULOCYTES NFR BLD: 1 %
INR PPP: 1.1
KETONES UR STRIP-MCNC: ABNORMAL MG/DL
LACTIC ACID, WHOLE BLOOD: 2.3 MMOL/L (ref 0.7–2.1)
LEUKOCYTE ESTERASE UR QL STRIP: ABNORMAL
LYMPHOCYTES NFR BLD: 1.34 K/UL (ref 1.1–3.7)
LYMPHOCYTES RELATIVE PERCENT: 39 % (ref 24–43)
MAGNESIUM SERPL-MCNC: 1.8 MG/DL (ref 1.6–2.4)
MCH RBC QN AUTO: 35.9 PG (ref 25.2–33.5)
MCHC RBC AUTO-ENTMCNC: 33.2 G/DL (ref 28.4–34.8)
MCV RBC AUTO: 108.2 FL (ref 82.6–102.9)
MICROORGANISM SPEC CULT: NORMAL
MONOCYTES NFR BLD: 0.25 K/UL (ref 0.1–1.2)
MONOCYTES NFR BLD: 7 % (ref 3–12)
NEUTROPHILS NFR BLD: 53 % (ref 36–65)
NEUTS SEG NFR BLD: 1.8 K/UL (ref 1.5–8.1)
NITRITE UR QL STRIP: NEGATIVE
NRBC BLD-RTO: 0 PER 100 WBC
PH UR STRIP: 5.5 [PH] (ref 5–8)
PLATELET # BLD AUTO: ABNORMAL K/UL (ref 138–453)
PLATELET, FLUORESCENCE: 101 K/UL (ref 138–453)
PLATELETS.RETICULATED NFR BLD AUTO: 6.7 % (ref 1.1–10.3)
POTASSIUM SERPL-SCNC: 3.5 MMOL/L (ref 3.7–5.3)
POTASSIUM SERPL-SCNC: 3.9 MMOL/L (ref 3.7–5.3)
PROT SERPL-MCNC: 5.2 G/DL (ref 6.6–8.7)
PROT UR STRIP-MCNC: ABNORMAL MG/DL
PROTHROMBIN TIME: 14.3 SEC (ref 11.7–14.9)
RBC # BLD AUTO: 4.01 M/UL (ref 3.95–5.11)
RBC #/AREA URNS HPF: NORMAL /HPF (ref 0–4)
SODIUM SERPL-SCNC: 137 MMOL/L (ref 136–145)
SP GR UR STRIP: 1.06 (ref 1–1.03)
SPECIMEN DESCRIPTION: NORMAL
UROBILINOGEN UR STRIP-ACNC: NORMAL EU/DL (ref 0–1)
WBC #/AREA URNS HPF: NORMAL /HPF (ref 0–5)
WBC OTHER # BLD: 3.4 K/UL (ref 3.5–11.3)

## 2025-06-11 PROCEDURE — 85610 PROTHROMBIN TIME: CPT

## 2025-06-11 PROCEDURE — 1200000000 HC SEMI PRIVATE

## 2025-06-11 PROCEDURE — A9579 GAD-BASE MR CONTRAST NOS,1ML: HCPCS | Performed by: NURSE PRACTITIONER

## 2025-06-11 PROCEDURE — 85055 RETICULATED PLATELET ASSAY: CPT

## 2025-06-11 PROCEDURE — 97166 OT EVAL MOD COMPLEX 45 MIN: CPT

## 2025-06-11 PROCEDURE — 6360000004 HC RX CONTRAST MEDICATION: Performed by: NURSE PRACTITIONER

## 2025-06-11 PROCEDURE — 81001 URINALYSIS AUTO W/SCOPE: CPT

## 2025-06-11 PROCEDURE — 36415 COLL VENOUS BLD VENIPUNCTURE: CPT

## 2025-06-11 PROCEDURE — 6370000000 HC RX 637 (ALT 250 FOR IP): Performed by: INTERNAL MEDICINE

## 2025-06-11 PROCEDURE — 2500000003 HC RX 250 WO HCPCS: Performed by: INTERNAL MEDICINE

## 2025-06-11 PROCEDURE — 87324 CLOSTRIDIUM AG IA: CPT

## 2025-06-11 PROCEDURE — 87328 CRYPTOSPORIDIUM AG IA: CPT

## 2025-06-11 PROCEDURE — 6360000002 HC RX W HCPCS: Performed by: FAMILY MEDICINE

## 2025-06-11 PROCEDURE — 6370000000 HC RX 637 (ALT 250 FOR IP): Performed by: FAMILY MEDICINE

## 2025-06-11 PROCEDURE — 74183 MRI ABD W/O CNTR FLWD CNTR: CPT

## 2025-06-11 PROCEDURE — 97162 PT EVAL MOD COMPLEX 30 MIN: CPT

## 2025-06-11 PROCEDURE — 2700000000 HC OXYGEN THERAPY PER DAY

## 2025-06-11 PROCEDURE — 87449 NOS EACH ORGANISM AG IA: CPT

## 2025-06-11 PROCEDURE — 6360000002 HC RX W HCPCS

## 2025-06-11 PROCEDURE — 80076 HEPATIC FUNCTION PANEL: CPT

## 2025-06-11 PROCEDURE — 86140 C-REACTIVE PROTEIN: CPT

## 2025-06-11 PROCEDURE — 93010 ELECTROCARDIOGRAM REPORT: CPT | Performed by: INTERNAL MEDICINE

## 2025-06-11 PROCEDURE — G0545 PR INHERENT VISIT TO INPT: HCPCS | Performed by: INTERNAL MEDICINE

## 2025-06-11 PROCEDURE — 87506 IADNA-DNA/RNA PROBE TQ 6-11: CPT

## 2025-06-11 PROCEDURE — 2580000003 HC RX 258: Performed by: INTERNAL MEDICINE

## 2025-06-11 PROCEDURE — 87329 GIARDIA AG IA: CPT

## 2025-06-11 PROCEDURE — 99232 SBSQ HOSP IP/OBS MODERATE 35: CPT | Performed by: FAMILY MEDICINE

## 2025-06-11 PROCEDURE — 87086 URINE CULTURE/COLONY COUNT: CPT

## 2025-06-11 PROCEDURE — 85025 COMPLETE CBC W/AUTO DIFF WBC: CPT

## 2025-06-11 PROCEDURE — 97535 SELF CARE MNGMENT TRAINING: CPT

## 2025-06-11 PROCEDURE — 6360000002 HC RX W HCPCS: Performed by: INTERNAL MEDICINE

## 2025-06-11 PROCEDURE — 80048 BASIC METABOLIC PNL TOTAL CA: CPT

## 2025-06-11 PROCEDURE — 83735 ASSAY OF MAGNESIUM: CPT

## 2025-06-11 PROCEDURE — 83605 ASSAY OF LACTIC ACID: CPT

## 2025-06-11 PROCEDURE — 99222 1ST HOSP IP/OBS MODERATE 55: CPT | Performed by: INTERNAL MEDICINE

## 2025-06-11 PROCEDURE — 97530 THERAPEUTIC ACTIVITIES: CPT

## 2025-06-11 RX ORDER — GADOTERIDOL 279.3 MG/ML
20 INJECTION INTRAVENOUS
Status: COMPLETED | OUTPATIENT
Start: 2025-06-11 | End: 2025-06-11

## 2025-06-11 RX ORDER — POLYETHYLENE GLYCOL 3350 17 G/17G
17 POWDER, FOR SOLUTION ORAL DAILY
Status: DISCONTINUED | OUTPATIENT
Start: 2025-06-11 | End: 2025-06-12

## 2025-06-11 RX ORDER — LORAZEPAM 2 MG/ML
1 INJECTION INTRAMUSCULAR PRN
Status: DISCONTINUED | OUTPATIENT
Start: 2025-06-11 | End: 2025-06-13

## 2025-06-11 RX ORDER — LACTULOSE 10 G/15ML
20 SOLUTION ORAL 3 TIMES DAILY
Status: DISCONTINUED | OUTPATIENT
Start: 2025-06-11 | End: 2025-06-12

## 2025-06-11 RX ORDER — SODIUM CHLORIDE 0.9 % (FLUSH) 0.9 %
10 SYRINGE (ML) INJECTION PRN
Status: DISCONTINUED | OUTPATIENT
Start: 2025-06-11 | End: 2025-06-14 | Stop reason: HOSPADM

## 2025-06-11 RX ADMIN — TAMSULOSIN HYDROCHLORIDE 0.4 MG: 0.4 CAPSULE ORAL at 09:08

## 2025-06-11 RX ADMIN — GABAPENTIN 300 MG: 300 CAPSULE ORAL at 20:36

## 2025-06-11 RX ADMIN — SODIUM CHLORIDE, PRESERVATIVE FREE 20 MG: 5 INJECTION INTRAVENOUS at 09:07

## 2025-06-11 RX ADMIN — GABAPENTIN 300 MG: 300 CAPSULE ORAL at 09:08

## 2025-06-11 RX ADMIN — POTASSIUM CHLORIDE 10 MEQ: 7.46 INJECTION, SOLUTION INTRAVENOUS at 00:55

## 2025-06-11 RX ADMIN — SODIUM CHLORIDE, PRESERVATIVE FREE 20 MG: 5 INJECTION INTRAVENOUS at 20:37

## 2025-06-11 RX ADMIN — MULTIVITAMIN TABLET 1 TABLET: TABLET at 09:08

## 2025-06-11 RX ADMIN — PIPERACILLIN AND TAZOBACTAM 3375 MG: 3; .375 INJECTION, POWDER, LYOPHILIZED, FOR SOLUTION INTRAVENOUS at 04:35

## 2025-06-11 RX ADMIN — GADOTERIDOL 20 ML: 279.3 INJECTION, SOLUTION INTRAVENOUS at 13:49

## 2025-06-11 RX ADMIN — POTASSIUM CHLORIDE AND SODIUM CHLORIDE: 900; 150 INJECTION, SOLUTION INTRAVENOUS at 18:02

## 2025-06-11 RX ADMIN — SODIUM CHLORIDE, PRESERVATIVE FREE 10 ML: 5 INJECTION INTRAVENOUS at 09:08

## 2025-06-11 RX ADMIN — LEVOTHYROXINE SODIUM 112 MCG: 112 TABLET ORAL at 09:08

## 2025-06-11 RX ADMIN — SODIUM CHLORIDE: 0.9 INJECTION, SOLUTION INTRAVENOUS at 04:33

## 2025-06-11 RX ADMIN — ENOXAPARIN SODIUM 30 MG: 100 INJECTION SUBCUTANEOUS at 09:07

## 2025-06-11 RX ADMIN — Medication 1 MG: at 13:03

## 2025-06-11 RX ADMIN — POTASSIUM CHLORIDE AND SODIUM CHLORIDE: 900; 150 INJECTION, SOLUTION INTRAVENOUS at 04:19

## 2025-06-11 RX ADMIN — POTASSIUM CHLORIDE AND SODIUM CHLORIDE: 900; 150 INJECTION, SOLUTION INTRAVENOUS at 09:16

## 2025-06-11 RX ADMIN — ENOXAPARIN SODIUM 30 MG: 100 INJECTION SUBCUTANEOUS at 20:36

## 2025-06-11 RX ADMIN — SODIUM CHLORIDE 1000 ML: 0.9 INJECTION, SOLUTION INTRAVENOUS at 03:06

## 2025-06-11 RX ADMIN — ACETAMINOPHEN 650 MG: 325 TABLET ORAL at 05:27

## 2025-06-11 RX ADMIN — LACTULOSE 20 G: 20 SOLUTION ORAL at 10:32

## 2025-06-11 RX ADMIN — GABAPENTIN 300 MG: 300 CAPSULE ORAL at 15:41

## 2025-06-11 RX ADMIN — FOLIC ACID 1 MG: 1 TABLET ORAL at 09:07

## 2025-06-11 RX ADMIN — CYANOCOBALAMIN TAB 500 MCG 1000 MCG: 500 TAB at 09:07

## 2025-06-11 ASSESSMENT — ENCOUNTER SYMPTOMS
GASTROINTESTINAL NEGATIVE: 1
DIARRHEA: 1
NAUSEA: 0
EYE DISCHARGE: 0
ALLERGIC/IMMUNOLOGIC NEGATIVE: 1
ABDOMINAL PAIN: 0
COLOR CHANGE: 0
APNEA: 0
EYES NEGATIVE: 1

## 2025-06-11 ASSESSMENT — PAIN SCALES - GENERAL
PAINLEVEL_OUTOF10: 6
PAINLEVEL_OUTOF10: 5

## 2025-06-11 NOTE — PROGRESS NOTES
Per Jazmyn from wound care, place ag fiber on open sacral areas and cover with optifoam until wound care can evaluate.

## 2025-06-11 NOTE — H&P
[facility]  Office: [phone]  Peyman Sepulveda, DO, Hilario Lopez, DO, Stanley Lees DO, Michele Liu, DO, Nick Posada MD, Rhonda Newby MD, Chiqui Moncada MD, Maria Antonia Vela MD,  Ashok Skinner MD, Marilyn White MD, Hillary Loco MD,  Thony Iniguez DO, Jacey Lopez MD, Bunny Swann MD, Parker Sepulveda DO, Mily Franco MD,  Ramon Perla DO, Darlyn Nicolas MD, Bianca Catherine MD, Mich Phan MD,  Alexandr Pack MD, Yinka Taylor MD, Marcy Kim MD, Byron Espinal MD, Gage Gray MD, Clemente Weaver MD, Yuval Yusuf, DO, Shira Cheung MD, Sade Carter DO, Logan Dhillon MD, Thony Champion MD, Mohsin Reza, MD, Nga Villa MD, Shirley Waterhouse, CNP,  Roberta Galvan, CNP, Yuval العراقي, CNP,  Nena Ibarra, WILL, Ruthie Roberts, CNP, Edwige Calabrese, CNP, Stacie Bruno, CNP, Aliyah Payan, CNP, Princess Duffy, PA-C, Katie Pedroza, CNP, Rajesh Carr, CNP,  Roxane Greenberg, CNP, Eladia Curiel, CNP, Stefano Harvey, PA-C, Kimberley Alvarez, CNP,  Nereida Pack, CNS, Dominique Leroy, CNP, Dannielle Ferraro, CNP,   Renea Kenney, CNP         [facility]   IN-PATIENT SERVICE   [facility]    HISTORY AND PHYSICAL EXAMINATION            Date:   6/10/2025  Patient name:  Samantha Hobson  Date of admission:  6/10/2025  3:46 PM  MRN:   [id number]  Account:  [id number]  YOB: 1955  PCP:    Eladia Arnett APRN - CNP  Room:   /  Code Status:    Full Code    Chief Complaint:     Chief Complaint   Patient presents with    Shortness of Breath    Vomiting    Dizziness   \"Because I've been tingling in my legs when I stand up . ..  and I am not steady\"    History Obtained From:     patient, family member -daughter, grand daughter    History of Present Illness:     Samantha Hobson is a 70 y.o.  female with history of B12 deficiency, cognitive impairment with neuropathy who presents with Shortness of Breath, Vomiting, and Dizziness   and is admitted to the  history of chronic neuropathy with prior history of severe B12 deficiency.  Recheck demonstrates low normal B12 levels.  Continue replacement.  Resume gabapentin.  Consult therapy.  Consider further neurosurgery evaluation, further workup if concern regarding myelopathy.    Acute metabolic encephalopathy with history ??cognitive impairment.  Continue to medically optimize.  Check further metabolic derangement  Constipation start bowel regimen.  Possibly contributing to urine retention .  Rat  infestation.  ???  Occult infection.  ID previously consulted in the ED. consider further workup for other occult infection, possible hantavirus or leptospirosis etc. if appropriate.  Claustrophobia.  Discussed with patient, as needed Ativan for imaging if necessary  Hypokalemia with RADHA.  Replace electrolytes likely related to nausea and vomiting with anorexia, poor p.o. intake.  Continue to correct electrolytes.      Question concerns and treatment plan discussed with patient and daughter, family at bedside.  Results of imaging discussed.      Prior records reviewed independently myself, discussed with nursing.  Medications reconciled    Likely discharge in 2 to 4 days contingent on clinical progress.          Consultations:   IP CONSULT TO INFECTIOUS DISEASES  IP CONSULT TO HOSPITALIST     Patient is admitted as inpatient status because of co-morbidities listed above, severity of signs and symptoms as outlined, requirement for current medical therapies and most importantly because of direct risk to patient if care not provided in a hospital setting.  Expected length of stay > 48 hours.    Mily Franco MD  6/10/2025  8:45 PM    Copy sent to Dr. Arnett, MARIA M Huff - CNP

## 2025-06-11 NOTE — PROGRESS NOTES
[facility]  Office: [phone]  Peyman Sepulveda, DO, Hilario Lopez, DO, Stanley Lees DO, Michele Liu, DO, Nick Posada MD, Rhonda Newby MD, Chiqui Moncada MD, Maria Antonia Vela MD,  Ashok Skinner MD, Marilyn White MD, Hillary Loco MD,  Thony Iniguez DO, Jacey Lopez MD, Bnuny Swann MD, Parker Sepulveda DO, Mily Franco MD,  Ramon Perla DO, Darlyn Nicolas MD, Bianca Catherine MD, Mich Phan MD,  Alexandr Pack MD, Yinka Taylor MD, Marcy Kim MD, Byron Espinal MD, Gage Gray MD, Clemente Weaver MD, Yuval Yusuf, DO, Shira Cheung MD, Sade Carter DO, Logan Dhillon MD, Thony Champion MD, Mohsin Reza, MD, Nga Villa MD, Shirley Waterhouse, CNP,  Roberta Galvan, CNP, Yuval العراقي, CNP,  Nena Ibarra, WILL, Ruthie Roberts, CNP, Edwige Calabrese, CNP, Stacie Bruno, CNP, Aliyah Payan, CNP, Princess Duffy, PA-C, Katie Pedroza, CNP, Rajesh Carr, CNP,  Roxane Greenberg, CNP, Eladia Curiel, CNP, Stefano Harvey, PA-C, Kimberley Alvarez, CNP,  Nereida Pack, CNS, Dominique Leroy, CNP, Dannielle Ferraro, CNP,   Renea Kenney, CNP         [facility]   IN-PATIENT SERVICE   [facility]    Progress Note    6/11/2025    4:12 PM    Name:   Samantha Hobson  MRN:     [id number]     Acct:      [id number]   Room:   0239/0239-01   Day:  1  Admit Date:  6/10/2025  3:46 PM    PCP:   Eladia Arnett APRN - CNP  Code Status:  Full Code    Subjective:     C/C:   Chief Complaint   Patient presents with    Shortness of Breath    Vomiting    Dizziness     Interval History Status: improved    Patient seen and examined at bedside, no acute events overnight.  The patient is not cooperative and angry and does not want to stay because she did not get her MRCP yet, denies any complaint for me .  Her orthostatics came back positive   patient vitals, labs and all providers notes were reviewed,from overnight shift and morning updates were noted and discussed with the  and ground-glass opacities diffusely with no focal consolidation. This may represent mild pulmonary edema or atypical infection.     XR CHEST (2 VW)  Result Date: 6/10/2025  No acute process.       Physical Examination:        Physical Exam  Vitals and nursing note reviewed.   Constitutional:       General: She is not in acute distress.  HENT:      Head: Normocephalic and atraumatic.   Eyes:      Conjunctiva/sclera: Conjunctivae normal.      Pupils: Pupils are equal, round, and reactive to light.   Cardiovascular:      Rate and Rhythm: Normal rate and regular rhythm.      Heart sounds: No murmur heard.  Pulmonary:      Effort: Pulmonary effort is normal. No accessory muscle usage or respiratory distress.      Breath sounds: No stridor. No decreased breath sounds, wheezing, rhonchi or rales.   Abdominal:      General: Bowel sounds are normal. There is no distension.      Palpations: Abdomen is soft. Abdomen is not rigid.      Tenderness: There is no abdominal tenderness. There is no guarding.   Musculoskeletal:         General: No tenderness.   Skin:     General: Skin is warm and dry.      Findings: No erythema, lesion or rash.   Neurological:      Mental Status: She is alert and oriented to person, place, and time.      Cranial Nerves: No cranial nerve deficit.      Motor: No seizure activity.   Psychiatric:         Speech: Speech normal.         Behavior: Behavior normal. Behavior is cooperative.         Assessment:        Hospital Problems           Last Modified POA    * (Principal) Hypokalemia 6/10/2025 Yes    History of bilateral hip replacements 6/11/2025 Yes    Acquired hypothyroidism 6/11/2025 Yes    Abnormal urine 6/11/2025 Yes    Transaminitis 6/11/2025 Yes    Orthostatic hypotension 6/11/2025 Yes    Neuropathy 6/11/2025 Yes    Cognitive impairment 6/11/2025 Yes       Plan:        Acute UTI with symptomatic retention.  Patient retention has resolved bladder scan for PVR x 2 with insignificant residual,

## 2025-06-11 NOTE — PROGRESS NOTES
Physical Therapy  Facility/Department: [address] ORTHO/MED SURG   Physical Therapy Initial Evaluation    Patient Name: Samantha Hobson        MRN: [id number]    : 1955    Date of Service: 2025    Chief Complaint   Patient presents with    Shortness of Breath    Vomiting    Dizziness     Past Medical History:  has a past medical history of Chronic back pain, Hypertension, Hypothyroidism, and Osteoarthritis.  Past Surgical History:  has a past surgical history that includes Spine surgery (2005); Hysterectomy (2005); Cholecystectomy; Hip fracture surgery (Right, 2016); eye surgery (Bilateral, ); joint replacement (Bilateral); Revision total knee arthroplasty (Left, 2023); IR INSERT PICC VAD W SQ PORT >5 YEARS (2023); and Revision total knee arthroplasty (Left, 2023).    Discharge Recommendations  Discharge Recommendations: Patient would benefit from continued therapy after discharge  PT Equipment Recommendations  Equipment Needed: No    Assessment  Body Structures, Functions, Activity Limitations Requiring Skilled Therapeutic Intervention: Decreased functional mobility , Decreased strength, Decreased endurance, Decreased balance  Assessment: The pt ambulated 50 ft with a RW x CGA. She moved slowly with a fluctuation in her balance. She could benefit from a continuation of PT for gait , strengthening and functional mobility prior to her DC  Therapy Prognosis: Good  Decision Making: Medium Complexity  Activity Tolerance  Activity Tolerance: Patient limited by fatigue, Patient limited by endurance  Safety Devices  Type of Devices: Nurse notified, Left in bed, Gait belt, Bed alarm in place, Call light within reach, Patient at risk for falls  Restraints  Restraints Initially in Place: No    AM-PAC  AM-PAC Basic Mobility - Inpatient   How much help is needed turning from your back to your side while in a flat bed without using bedrails?: None  How much help is needed moving from  Secondary  Laundry Responsibility: No (niece completes)  Cleaning Responsibility: Secondary  Prior Level of Assist for Ambulation: Independent household ambulator, with or without device  Prior Level of Assist for Transfers: Independent  Active : Yes  Mode of Transportation: SUV  Occupation: Workers comp  Type of Occupation: [employer]  Leisure & Hobbies: spend time with grandchildren  Additional Comments: pt reported family supportive and able to assist PRN and provide close to 24/7    Vision/Hearing  Vision  Vision: Impaired  Vision Exceptions: Wears glasses at all times  Hearing  Hearing: Within functional limits    Objective  Orientation  Overall Orientation Status: Within Functional Limits  Orientation Level: Oriented X4  Cognition  Overall Cognitive Status: WFL         AROM RLE (degrees)  RLE AROM: WNL  AROM LLE (degrees)  LLE AROM : WNL  AROM RUE (degrees)  RUE AROM : WNL  AROM LUE (degrees)  LUE AROM : WNL          Strength RLE  Strength RLE: WFL  Strength LLE  Strength LLE: WFL  Strength RUE  Strength RUE: WFL  Strength LUE  Strength LUE: WFL    Mobility   Bed mobility  Supine to Sit: Contact guard assistance  Sit to Supine: Contact guard assistance  Scooting: Contact guard assistance         Transfers  Sit to Stand: Contact guard assistance  Stand to Sit: Contact guard assistance    Ambulation  Surface: Level tile  Device: Rolling Walker  Assistance: Contact guard assistance  Distance: amb 50 ft with a RW x CGA  Comments: Fluctuation in balance noticed during ambulation         Balance   Balance  Posture: Good  Sitting - Static: Good  Sitting - Dynamic: Fair  Standing - Static: Good  Standing - Dynamic: Fair    Exercise       Patient Education  Patient Education  Education Given To: Patient  Education Provided: Role of Therapy;Plan of Care  Education Method: Verbal  Barriers to Learning: None  Education Outcome: Verbalized understanding    Plan  Physical Therapy Plan  General Plan:  (5-6x

## 2025-06-11 NOTE — CONSULTS
[facility] -   Infectious diseases evaluation  admission date 6/10/2025    reason for consultation:   Suspected leptospirosis     Impression :   Current:  Doubt leptospira- this is not the picture  nausea, vomiting, diarrhea x 2 months - worse last 2 weeks  Hypokalemia 2.3  numbness tingling of the calves  and legs   Weakness and falls  M,ild intra and extra hepatic biliary tree dilatation - likely due to past cholecystectomy  Epigastric pain   Doubt UTI  - no dysuria and  contaminated UA w epithelial cells -  No hydronephrosis w  on CT  200 cc urine retention in ER - then  16cc on the floor  Exposure to mice at home  Relative leukopenia - old    Other:    Discussion / summary of stay / plan of care/ Recommendations:     HENCE:   CRP < 3  Urine contaminated w epith cells - and urine sampling was an issue - 6/11 asking pt for a new UA cx through clean catch  Stools for diarrhea ANDERSON  GI PCR  C diff  Norovirus  Giardia cryptospora PCR  Stop all AB for now    Infection Control Recommendations   [place] Precautions    Antimicrobial Stewardship Recommendations   Simplification of therapy  Targeted therapy    History of Present Illness:   Initial history:  Samantha Hobson is a 70 y.o.-year-old female with past medical history of  Hypertension  Hypothyroidism  Chronic back pain  Cognitive impairment    Patient came in with a complaint of nausea and vomiting that has been going on over the past 2 weeks.  It was initially intermittent and now it has become persistent.  Patient has history of chronic neuropathy and takes Flexeril for relief.  But this past 2 weeks she is having increased myalgias in the calfs along with the pins and needle sensation.  She feels that she is having imbalance on walking.  Patient states that her house is full of rats and she is unable to get rid of them.    Labs in ER was concerning for hypokalemia 2.3, creatinine elevation likely due to

## 2025-06-11 NOTE — CONSULTS
[facility]    GASTROENTEROLOGY CONSULT    Patient:   Samantha Hobson   :    1955   Facility:   [facility]   Date:    2025  Admission Dx:  Hypokalemia [E87.6]  Primary hypertension [I10]  RADHA (acute kidney injury) [N17.9]  Requesting physician: Maria Antonia Vela MD  Reason for consult:  Eval? Cholangitis?   CC : \"Shortness of breath, vomiting, dizziness\"    SUBJECTIVE     HISTORY OF PRESENT ILLNESS  This is a 70 y.o.  femalehistory of B12 deficiency, cognitive impairment with neuropathy who presents with Shortness of Breath, Vomiting, and Dizziness. Patient initially presented with complaints of intermittent nausea, vomiting, epigastric pain x 2 weeks. Patient's daughter also reporting fatigue and malaise. CT imaging suggesting possible cholangitis/possible common bile duct stricture. ALT 8, AST 39., Afebrile, WBC 7.4.  We have been asked to see the patient in consultation by Maria Antonia Vela MD for evaluation of cholangitis.     Patient seen and examined at the bedside. Patient with reported cognative delay. HPI limited. At the time of exam, patient is alert and oriented x4. Patient denies abdominal pain, nausea, vomiting, and diarrhea. Denies fevers and chills. Discussed need for MRI/MRCP, patient with hesitation due to extreme claustrophobia. Discussed sedation for exam, patient agreeable to attempt MRI/MRCP.        OBJECTIVE:     PAST MEDICAL/SURGICAL HISTORY  Past Medical History:   Diagnosis Date    Chronic back pain     S/P L4/L5 OR    Hypertension     Hypothyroidism     Osteoarthritis      Past Surgical History:   Procedure Laterality Date    CHOLECYSTECTOMY      EYE SURGERY Bilateral     Cataracts    HIP FRACTURE SURGERY Right 2016    closed reduction percutaneous pinning    HYSTERECTOMY (CERVIX STATUS UNKNOWN)  2005    IR INSERT PICC VAD W SQ PORT >5 YEARS  2023    IR INS PICC VAD W SQ PORT GREATER THAN 5 2023 [facility]

## 2025-06-11 NOTE — PROGRESS NOTES
Occupational Therapy Initial Evaluation  Facility/Department: [address] ORTHO/MED SURG   Patient Name: Samantha Hobson        MRN: [id number]    : 1955    Date of Service: 2025    Chief Complaint   Patient presents with    Shortness of Breath    Vomiting    Dizziness     Past Medical History:  has a past medical history of Chronic back pain, Hypertension, Hypothyroidism, and Osteoarthritis.  Past Surgical History:  has a past surgical history that includes Spine surgery (2005); Hysterectomy (2005); Cholecystectomy; Hip fracture surgery (Right, 2016); eye surgery (Bilateral, ); joint replacement (Bilateral); Revision total knee arthroplasty (Left, 2023); IR INSERT PICC VAD W SQ PORT >5 YEARS (2023); and Revision total knee arthroplasty (Left, 2023).    Discharge Recommendations   Further therapy recommended at discharge.         Assessment  Performance deficits / Impairments: Decreased functional mobility ;Decreased ADL status;Decreased endurance;Decreased high-level IADLs;Decreased balance;Decreased strength  Assessment: pt demonstrated above deficits impacting occupational performance. pt would benefit from continued acute OT, as well as at discharge in order to maximize safety melly ndependence.  Prognosis: Good  Decision Making: Medium Complexity  REQUIRES OT FOLLOW-UP: Yes  Activity Tolerance  Activity Tolerance: Patient Tolerated treatment well  Safety Devices  Type of Devices: Call light within reach;Left in bed;Gait belt;Nurse notified;Bed alarm in place  Restraints  Restraints Initially in Place: No    AM-PAC  AM-PAC Daily Activity - Inpatient   How much help is needed for putting on and taking off regular lower body clothing?: A Little  How much help is needed for bathing (which includes washing, rinsing, drying)?: A Little  How much help is needed for toileting (which includes using toilet, bedpan, or urinal)?: A Little  How much help is needed for putting on and  guard assistance  Stand to sit: Contact guard assistance  Transfer Comments: from EOB and toileting with RW    Toilet Transfers  Toilet - Technique: Ambulating  Equipment Used: Standard toilet  Toilet Transfer: Contact guard assistance    Functional Mobility: Contact guard assistance  Functional Mobility Skilled Clinical Factors: pt completed functional mobility to/from bathroom and in hallway to simulate household distances with CGA and RW. pt with 1 LOB while turning in bathroom requiring min A to correct.        Patient Education  Patient Education  Education Given To: Patient  Education Provided: Role of Therapy;Plan of Care  Education Method: Verbal  Barriers to Learning: None  Education Outcome: Verbalized understanding    Goals  Short Term Goals  Time Frame for Short Term Goals: pt will, by discharge  Short Term Goal 1: complete LB ADLs and toileting tasks with SBA and AAE, as needed  Short Term Goal 2: complete UB ADLs with mod I  Short Term Goal 3: increase activity tolerance to 25+ minutes in order to participate in daily tasks  Short Term Goal 4: dem ~8 minutes dynamic standing tolerance with SBA in order to complete functional tasks  Short Term Goal 5: dem SBA during functional transfers/functional mobility with LRAD, as needed, and no LOB    Plan  Occupational Therapy Plan  Times Per Week: 3-4x/wk  Current Treatment Recommendations: Endurance training, Balance training, Functional mobility training, Strengthening, Patient/Caregiver education & training, Safety education & training, Self-Care / ADL, Home management training    Minutes  OT Individual Minutes  Time In: 0827  Time Out: 0858  Minutes: 31  Time Code Minutes   Timed Code Treatment Minutes: 8 Minutes    Electronically signed by KVNG Allen on 6/11/25 at 2:18 PM EDT

## 2025-06-11 NOTE — PLAN OF CARE
Problem: Discharge Planning  Goal: Discharge to home or other facility with appropriate resources  6/11/2025 1818 by Matty Boyle, RN  Outcome: Progressing  6/11/2025 0548 by Liss Blood RN  Outcome: Progressing     Problem: Safety - Adult  Goal: Free from fall injury  6/11/2025 1818 by Matty Boyle, RN  Outcome: Progressing  6/11/2025 0548 by Liss Blood, RN  Outcome: Progressing

## 2025-06-12 PROBLEM — K59.1 FUNCTIONAL DIARRHEA: Status: ACTIVE | Noted: 2025-06-12

## 2025-06-12 PROBLEM — Z96.643 HISTORY OF BILATERAL HIP REPLACEMENTS: Status: RESOLVED | Noted: 2022-07-19 | Resolved: 2025-06-12

## 2025-06-12 PROBLEM — R82.90 ABNORMAL URINE: Status: RESOLVED | Noted: 2025-06-11 | Resolved: 2025-06-12

## 2025-06-12 LAB
ALBUMIN SERPL-MCNC: 2.7 G/DL (ref 3.5–5.2)
ALBUMIN/GLOB SERPL: 1 {RATIO} (ref 1–2.5)
ALP SERPL-CCNC: 88 U/L (ref 35–104)
ALT SERPL-CCNC: 9 U/L (ref 10–35)
ANION GAP SERPL CALCULATED.3IONS-SCNC: 11 MMOL/L (ref 9–16)
AST SERPL-CCNC: 43 U/L (ref 10–35)
BASOPHILS # BLD: <0.03 K/UL (ref 0–0.2)
BASOPHILS NFR BLD: 0 % (ref 0–2)
BILIRUB DIRECT SERPL-MCNC: 0.4 MG/DL (ref 0–0.2)
BILIRUB INDIRECT SERPL-MCNC: 0.2 MG/DL (ref 0–1)
BILIRUB SERPL-MCNC: 0.6 MG/DL (ref 0–1.2)
BUN SERPL-MCNC: 4 MG/DL (ref 8–23)
C DIFF GDH + TOXINS A+B STL QL IA.RAPID: NEGATIVE
C PARVUM AG STL QL IA: NEGATIVE
CALCIUM SERPL-MCNC: 7.9 MG/DL (ref 8.6–10.4)
CAMPYLOBACTER DNA SPEC NAA+PROBE: NORMAL
CHLORIDE SERPL-SCNC: 103 MMOL/L (ref 98–107)
CO2 SERPL-SCNC: 27 MMOL/L (ref 20–31)
CREAT SERPL-MCNC: 0.8 MG/DL (ref 0.6–0.9)
EOSINOPHIL # BLD: <0.03 K/UL (ref 0–0.44)
EOSINOPHILS RELATIVE PERCENT: 0 % (ref 1–4)
ERYTHROCYTE [DISTWIDTH] IN BLOOD BY AUTOMATED COUNT: 14.3 % (ref 11.8–14.4)
ETEC ELTA+ESTB GENES STL QL NAA+PROBE: NORMAL
G LAMBLIA AG STL QL IA: NEGATIVE
GFR, ESTIMATED: 80 ML/MIN/1.73M2
GLUCOSE SERPL-MCNC: 79 MG/DL (ref 74–99)
HCT VFR BLD AUTO: 43.5 % (ref 36.3–47.1)
HGB BLD-MCNC: 14.6 G/DL (ref 11.9–15.1)
IMM GRANULOCYTES # BLD AUTO: <0.03 K/UL (ref 0–0.3)
IMM GRANULOCYTES NFR BLD: 0 %
LYMPHOCYTES NFR BLD: 1.21 K/UL (ref 1.1–3.7)
LYMPHOCYTES RELATIVE PERCENT: 37 % (ref 24–43)
MAGNESIUM SERPL-MCNC: 1.6 MG/DL (ref 1.6–2.4)
MCH RBC QN AUTO: 36.2 PG (ref 25.2–33.5)
MCHC RBC AUTO-ENTMCNC: 33.6 G/DL (ref 28.4–34.8)
MCV RBC AUTO: 107.9 FL (ref 82.6–102.9)
MICROORGANISM SPEC CULT: NO GROWTH
MONOCYTES NFR BLD: 0.28 K/UL (ref 0.1–1.2)
MONOCYTES NFR BLD: 9 % (ref 3–12)
NEUTROPHILS NFR BLD: 54 % (ref 36–65)
NEUTS SEG NFR BLD: 1.76 K/UL (ref 1.5–8.1)
NRBC BLD-RTO: 0 PER 100 WBC
P SHIGELLOIDES DNA STL QL NAA+PROBE: NORMAL
PLATELET # BLD AUTO: 99 K/UL (ref 138–453)
PMV BLD AUTO: 11.5 FL (ref 8.1–13.5)
POTASSIUM SERPL-SCNC: 3.5 MMOL/L (ref 3.7–5.3)
PROT SERPL-MCNC: 5.3 G/DL (ref 6.6–8.7)
RBC # BLD AUTO: 4.03 M/UL (ref 3.95–5.11)
RBC # BLD: ABNORMAL 10*6/UL
SALMONELLA DNA SPEC QL NAA+PROBE: NORMAL
SHIGA TOXIN STX GENE SPEC NAA+PROBE: NORMAL
SHIGELLA DNA SPEC QL NAA+PROBE: NORMAL
SODIUM SERPL-SCNC: 141 MMOL/L (ref 136–145)
SOURCE: NORMAL
SPECIMEN DESCRIPTION: NORMAL
V CHOL+PARA RFBL+TRKH+TNAA STL QL NAA+PR: NORMAL
WBC OTHER # BLD: 3.3 K/UL (ref 3.5–11.3)
Y ENTERO RECN STL QL NAA+PROBE: NORMAL

## 2025-06-12 PROCEDURE — 80053 COMPREHEN METABOLIC PANEL: CPT

## 2025-06-12 PROCEDURE — 99212 OFFICE O/P EST SF 10 MIN: CPT

## 2025-06-12 PROCEDURE — G0545 PR INHERENT VISIT TO INPT: HCPCS | Performed by: INTERNAL MEDICINE

## 2025-06-12 PROCEDURE — 99232 SBSQ HOSP IP/OBS MODERATE 35: CPT | Performed by: INTERNAL MEDICINE

## 2025-06-12 PROCEDURE — 6360000002 HC RX W HCPCS: Performed by: STUDENT IN AN ORGANIZED HEALTH CARE EDUCATION/TRAINING PROGRAM

## 2025-06-12 PROCEDURE — 99232 SBSQ HOSP IP/OBS MODERATE 35: CPT | Performed by: STUDENT IN AN ORGANIZED HEALTH CARE EDUCATION/TRAINING PROGRAM

## 2025-06-12 PROCEDURE — 6360000002 HC RX W HCPCS: Performed by: INTERNAL MEDICINE

## 2025-06-12 PROCEDURE — 99231 SBSQ HOSP IP/OBS SF/LOW 25: CPT | Performed by: INTERNAL MEDICINE

## 2025-06-12 PROCEDURE — 83735 ASSAY OF MAGNESIUM: CPT

## 2025-06-12 PROCEDURE — 6360000002 HC RX W HCPCS

## 2025-06-12 PROCEDURE — 6360000002 HC RX W HCPCS: Performed by: FAMILY MEDICINE

## 2025-06-12 PROCEDURE — 85025 COMPLETE CBC W/AUTO DIFF WBC: CPT

## 2025-06-12 PROCEDURE — 82248 BILIRUBIN DIRECT: CPT

## 2025-06-12 PROCEDURE — 1200000000 HC SEMI PRIVATE

## 2025-06-12 PROCEDURE — 6370000000 HC RX 637 (ALT 250 FOR IP): Performed by: INTERNAL MEDICINE

## 2025-06-12 PROCEDURE — 36415 COLL VENOUS BLD VENIPUNCTURE: CPT

## 2025-06-12 PROCEDURE — 2500000003 HC RX 250 WO HCPCS: Performed by: INTERNAL MEDICINE

## 2025-06-12 RX ORDER — MAGNESIUM SULFATE IN WATER 40 MG/ML
2000 INJECTION, SOLUTION INTRAVENOUS ONCE
Status: COMPLETED | OUTPATIENT
Start: 2025-06-12 | End: 2025-06-12

## 2025-06-12 RX ADMIN — TAMSULOSIN HYDROCHLORIDE 0.4 MG: 0.4 CAPSULE ORAL at 09:02

## 2025-06-12 RX ADMIN — FOLIC ACID 1 MG: 1 TABLET ORAL at 08:58

## 2025-06-12 RX ADMIN — ACETAMINOPHEN 650 MG: 325 TABLET ORAL at 14:47

## 2025-06-12 RX ADMIN — POTASSIUM CHLORIDE AND SODIUM CHLORIDE: 900; 150 INJECTION, SOLUTION INTRAVENOUS at 23:12

## 2025-06-12 RX ADMIN — ENOXAPARIN SODIUM 30 MG: 100 INJECTION SUBCUTANEOUS at 20:35

## 2025-06-12 RX ADMIN — ENOXAPARIN SODIUM 30 MG: 100 INJECTION SUBCUTANEOUS at 08:57

## 2025-06-12 RX ADMIN — GABAPENTIN 300 MG: 300 CAPSULE ORAL at 20:35

## 2025-06-12 RX ADMIN — POTASSIUM CHLORIDE AND SODIUM CHLORIDE: 900; 150 INJECTION, SOLUTION INTRAVENOUS at 09:09

## 2025-06-12 RX ADMIN — SODIUM CHLORIDE, PRESERVATIVE FREE 20 MG: 5 INJECTION INTRAVENOUS at 08:58

## 2025-06-12 RX ADMIN — MAGNESIUM SULFATE HEPTAHYDRATE 2000 MG: 40 INJECTION, SOLUTION INTRAVENOUS at 12:31

## 2025-06-12 RX ADMIN — CYANOCOBALAMIN TAB 500 MCG 1000 MCG: 500 TAB at 08:58

## 2025-06-12 RX ADMIN — SODIUM CHLORIDE, PRESERVATIVE FREE 20 MG: 5 INJECTION INTRAVENOUS at 20:36

## 2025-06-12 RX ADMIN — MULTIVITAMIN TABLET 1 TABLET: TABLET at 08:58

## 2025-06-12 RX ADMIN — GABAPENTIN 300 MG: 300 CAPSULE ORAL at 14:46

## 2025-06-12 RX ADMIN — GABAPENTIN 300 MG: 300 CAPSULE ORAL at 08:58

## 2025-06-12 RX ADMIN — LEVOTHYROXINE SODIUM 112 MCG: 112 TABLET ORAL at 09:00

## 2025-06-12 RX ADMIN — Medication 1 MG: at 14:46

## 2025-06-12 ASSESSMENT — ENCOUNTER SYMPTOMS
ALLERGIC/IMMUNOLOGIC NEGATIVE: 1
EYES NEGATIVE: 1
RESPIRATORY NEGATIVE: 1
ABDOMINAL PAIN: 0
APNEA: 0
DIARRHEA: 1
NAUSEA: 0
COLOR CHANGE: 0
EYE DISCHARGE: 0
GASTROINTESTINAL NEGATIVE: 1

## 2025-06-12 ASSESSMENT — PAIN SCALES - GENERAL: PAINLEVEL_OUTOF10: 7

## 2025-06-12 NOTE — PROGRESS NOTES
[facility] Gastroenterology Progress Note    Samantha Hobson is a 70 y.o. female patient.  Hospitalization Day:2      Chief consult reason:   Evaluation for cholangitis    Subjective:  Patient seen and examined at the bedside.  No acute events reported overnight.  Patient denies abdominal pain, nausea, vomiting.  No reports of diarrhea.    Objective:   VITALS:  BP (!) 120/90   Pulse 79   Temp 97.5 °F (36.4 °C)   Resp 16   Wt 102.5 kg (225 lb 15.5 oz)   SpO2 99%   BMI 32.42 kg/m²   TEMPERATURE:  Current - Temp: 97.5 °F (36.4 °C); Max - Temp  Av.6 °F (36.4 °C)  Min: 97.3 °F (36.3 °C)  Max: 97.7 °F (36.5 °C)    Physical Assessment:  Physical Exam  Constitutional:       General: She is not in acute distress.     Appearance: She is obese. She is not ill-appearing.   HENT:      Mouth/Throat:      Mouth: Mucous membranes are moist.      Pharynx: Oropharynx is clear.   Eyes:      Pupils: Pupils are equal, round, and reactive to light.   Cardiovascular:      Rate and Rhythm: Normal rate.   Pulmonary:      Effort: Pulmonary effort is normal. No respiratory distress.   Abdominal:      General: Bowel sounds are normal. There is no distension.      Palpations: Abdomen is soft.      Tenderness: There is no abdominal tenderness. There is no guarding.   Skin:     General: Skin is warm and dry.   Neurological:      Mental Status: She is alert and oriented to person, place, and time.      Comments: Reported cognitive delay by daughter         Review of Systems:  Review of Systems   Constitutional: Negative.    HENT: Negative.     Eyes: Negative.    Respiratory: Negative.     Cardiovascular: Negative.    Gastrointestinal: Negative.    Endocrine: Negative.    Genitourinary: Negative.    Musculoskeletal: Negative.    Skin: Negative.    Allergic/Immunologic: Negative.    Neurological: Negative.    Hematological: Negative.    Psychiatric/Behavioral: Negative.         CURRENT MEDICATIONS:  Scheduled Meds:   polyethylene  glycol  17 g Oral Daily    sodium chloride flush  5-40 mL IntraVENous 2 times per day    enoxaparin  30 mg SubCUTAneous BID    [Held by provider] calcium carb-cholecalciferol  2 tablet Oral Daily    gabapentin  300 mg Oral TID    levothyroxine  112 mcg Oral Daily    famotidine (PEPCID) injection  20 mg IntraVENous BID    tamsulosin  0.4 mg Oral Daily    multivitamin  1 tablet Oral Daily    folic acid  1 mg Oral Daily    vitamin B-12  1,000 mcg Oral Daily     Continuous Infusions:   sodium chloride Stopped (06/11/25 0435)    0.9% NaCl with KCl 20 mEq 75 mL/hr at 06/12/25 0915     PRN Meds:LORazepam, sodium chloride flush, sodium chloride flush, sodium chloride, potassium chloride **OR** potassium alternative oral replacement **OR** potassium chloride, magnesium sulfate, ondansetron **OR** ondansetron, polyethylene glycol, acetaminophen **OR** acetaminophen      Data Review:  LABS and IMAGING:     CBC  Recent Labs     06/10/25  1632 06/11/25  0734 06/12/25  0816   WBC 7.4 3.4* 3.3*   HGB 17.7* 14.4 14.6   HCT 50.7* 43.4 43.5   .6 108.2* 107.9*   MCHC 34.9* 33.2 33.6   RDW 14.0 14.2 14.3    See Reflexed IPF Result 99*       Immature PLTs   Lab Results   Component Value Date/Time    PLTFLUORE 101 06/11/2025 07:34 AM    PLTFLUORE 37 08/14/2022 04:32 AM    PLTFLUORE 44 08/13/2022 03:56 AM       PT/INR  Recent Labs     06/11/25  0734   PROTIME 14.3   INR 1.1       ANEMIA STUDIES  Recent Labs     06/10/25  2151   [id number] 268   FOLATE 2.4*       BMP  Recent Labs     06/10/25  1632 06/10/25  1736 06/10/25  2151 06/11/25  0734 06/12/25  0816     --   --  137 141   K 2.3*  --  3.9 3.5* 3.5*   CL 88*  --   --  100 103   CO2 30  --   --  26 27   BUN 8  --   --  5* 4*   CREATININE 1.3*  --   --  1.0* 0.8   GLUCOSE 145*  --   --  72* 79   CALCIUM 9.3  --   --  7.9* 7.9*   MG  --  1.6  --  1.8 1.6       LFTS  Recent Labs     06/10/25  1632 06/11/25  0734 06/12/25  0816   ALKPHOS 127* 86 88   ALT 12 8* 9*   AST

## 2025-06-12 NOTE — PLAN OF CARE
Problem: Discharge Planning  Goal: Discharge to home or other facility with appropriate resources  6/12/2025 1716 by Jessica Rdz RN  Outcome: Progressing  Flowsheets (Taken 6/12/2025 0800)  Discharge to home or other facility with appropriate resources:   Refer to discharge planning if patient needs post-hospital services based on physician order or complex needs related to functional status, cognitive ability or social support system   Identify barriers to discharge with patient and caregiver  6/12/2025 0411 by Roxana Shields RN  Outcome: Progressing     Problem: Safety - Adult  Goal: Free from fall injury  6/12/2025 1716 by Jessica Rdz RN  Outcome: Progressing  Flowsheets (Taken 6/12/2025 1713)  Free From Fall Injury: Instruct family/caregiver on patient safety  6/12/2025 0411 by Roxana Shields RN  Outcome: Progressing     Problem: Skin/Tissue Integrity  Goal: Skin integrity remains intact  Description: 1.  Monitor for areas of redness and/or skin breakdown2.  Assess vascular access sites hourly3.  Every 4-6 hours minimum:  Change oxygen saturation probe site4.  Every 4-6 hours:  If on nasal continuous positive airway pressure, respiratory therapy assess nares and determine need for appliance change or resting period  Outcome: Progressing     Problem: ABCDS Injury Assessment  Goal: Absence of physical injury  Outcome: Progressing

## 2025-06-12 NOTE — PROGRESS NOTES
[facility] -   Infectious diseases evaluation  admission date 6/10/2025    reason for consultation:   Suspected leptospirosis     Impression :   Current:  Doubt leptospira- this is not the picture  nausea, vomiting, diarrhea x 2 months - worse last 2 weeks  Hypokalemia 2.3  numbness tingling of the calves  and legs   Weakness and falls  M,ild intra and extra hepatic biliary tree dilatation - likely due to past cholecystectomy  Epigastric pain   Doubt UTI  - no dysuria and  contaminated UA w epithelial cells -  No hydronephrosis w  on CT  200 cc urine retention in ER - then  16cc on the floor  Exposure to mice at home  Relative leukopenia - old    Other:    Discussion / summary of stay / plan of care/ Recommendations:     HENCE:   CRP < 3  Urine contaminated w epith cells -U cx neg  Stools for diarrhea ANDERSON  GI PCR ng  C diff neg  Norovirus  Giardia cryptospora PCR neg  Stop all AB for now  OK for DC    Infection Control Recommendations   [place] Precautions    Antimicrobial Stewardship Recommendations   Simplification of therapy  Targeted therapy    History of Present Illness:   Initial history:  Samantha Hobson is a 70 y.o.-year-old female with past medical history of  Hypertension  Hypothyroidism  Chronic back pain  Cognitive impairment    Patient came in with a complaint of nausea and vomiting that has been going on over the past 2 weeks.  It was initially intermittent and now it has become persistent.  Patient has history of chronic neuropathy and takes Flexeril for relief.  But this past 2 weeks she is having increased myalgias in the calfs along with the pins and needle sensation.  She feels that she is having imbalance on walking.  Patient states that her house is full of rats and she is unable to get rid of them.    Labs in ER was concerning for hypokalemia 2.3, creatinine elevation likely due to dehydration, hypophosphatemia 2, folate 2.4(low), lactic acidosis       Spouse name: Not on file    Number of children: Not on file    Years of education: Not on file    Highest education level: Not on file   Occupational History    Not on file   Tobacco Use    Smoking status: Never    Smokeless tobacco: Never   Vaping Use    Vaping status: Never Used   Substance and Sexual Activity    Alcohol use: No    Drug use: No    Sexual activity: Never   Other Topics Concern    Not on file   Social History Narrative    Not on file     Social Drivers of Health     Financial Resource Strain: Low Risk  (6/28/2024)    Overall Financial Resource Strain (CARDIA)     Difficulty of Paying Living Expenses: Not hard at all   Food Insecurity: No Food Insecurity (6/28/2024)    Hunger Vital Sign     Worried About Running Out of Food in the Last Year: Never true     Ran Out of Food in the Last Year: Never true   Transportation Needs: Unknown (6/28/2024)    PRAPARE - Transportation     Lack of Transportation (Medical): Not on file     Lack of Transportation (Non-Medical): No   Physical Activity: Inactive (6/28/2024)    Exercise Vital Sign     Days of Exercise per Week: 0 days     Minutes of Exercise per Session: 0 min   Stress: Not on file   Social Connections: Not on file   Intimate Partner Violence: Not on file   Housing Stability: Unknown (6/28/2024)    Housing Stability Vital Sign     Unable to Pay for Housing in the Last Year: Not on file     Number of Places Lived in the Last Year: Not on file     Unstable Housing in the Last Year: No       Family History:     Family History   Problem Relation Age of Onset    High Blood Pressure Mother     Other Mother         low heart rate     Pacemaker Mother     Heart Attack Father     Lung Cancer Father     Cancer Sister         throat cancer x2    Breast Cancer Paternal Aunt     Cancer Paternal Aunt         multiple other cancers       Medical Decision Making:   I have independently reviewed/ordered the following labs:    CBC with Differential:   Recent

## 2025-06-12 NOTE — PLAN OF CARE
Problem: Discharge Planning  Goal: Discharge to home or other facility with appropriate resources  6/12/2025 0411 by Roxana Shields, RN  Outcome: Progressing     Problem: Safety - Adult  Goal: Free from fall injury  6/12/2025 0411 by Roxana Shields, RN  Outcome: Progressing

## 2025-06-12 NOTE — PROGRESS NOTES
[facility] Wound Ostomy  Nurse  Consult Note       NAME:  Samantha Hobson  MEDICAL RECORD NUMBER:  [id number]  AGE: 70 y.o.   GENDER: female  : 1955  TODAY'S DATE:  2025    Subjective   Reason for [facility] Nurse Evaluation and Assessment: \"sacrum\"      Samantha Hobson is a 70 y.o. female referred by:   [x] Physician  [] Nursing  [] Other:       Wound History:   Presents with significant scarring to the intergluteal cleft with a small \"hole\" noted mid way and a full thickness ulceration distally to the right buttock.  Patient has no knowledge of having a wound or of ever having been diagnosed with pilonidal cyst.  There does  not appear to be any exudate from the wound and she does not report any significant pain.  She has c/o cold and tingling to her feet.            PAST MEDICAL HISTORY        Diagnosis Date    Chronic back pain     S/P L4/L5 OR    Hypertension     Hypothyroidism     Osteoarthritis        PAST SURGICAL HISTORY    Past Surgical History:   Procedure Laterality Date    CHOLECYSTECTOMY      EYE SURGERY Bilateral     Cataracts    HIP FRACTURE SURGERY Right 2016    closed reduction percutaneous pinning    HYSTERECTOMY (CERVIX STATUS UNKNOWN)  2005    IR INSERT PICC VAD W SQ PORT >5 YEARS  2023    IR INS PICC VAD W SQ PORT GREATER THAN 5 2023 [facility] SPECIAL PROCEDURES    JOINT REPLACEMENT Bilateral     Hip , bilaterally. L. Knee    REVISION TOTAL KNEE ARTHROPLASTY Left 2023    LEFT KNEE   REMOVAL OF INFECTED PROSTHESIS AND PLACEMENT OF SPACER- ORTHO REMEDIES, ED performed by Justus Miguel DO at [facility] OR    REVISION TOTAL KNEE ARTHROPLASTY Left 2023    LEFT KNEE TOTAL ARTHROPLASTY REVISION WITH FROZEN SECTIONS performed by Justus Miguel DO at [facility] OR    SPINE SURGERY  2005    L4/L5 CCage inmplanted       FAMILY HISTORY    Family History   Problem Relation Age of Onset    High Blood Pressure Mother     Other Mother         low heart rate

## 2025-06-12 NOTE — CARE COORDINATION
Case Management Assessment  Initial Evaluation    Date/Time of Evaluation: 6/11/25 1643 PM   Assessment Completed by: Naomy Sidhu    If patient is discharged prior to next notation, then this note serves as note for discharge by case management.    Patient Name: Samantha Hobson                   YOB: 1955  Diagnosis: Hypokalemia [E87.6]  Primary hypertension [I10]  RADHA (acute kidney injury) [N17.9]                   Date / Time: 6/10/2025  3:46 PM    Patient Admission Status: Inpatient   Readmission Risk (Low < 19, Mod (19-27), High > 27): Readmission Risk Score: 12.3    Current PCP: Eladia Arnett APRN - CNP  PCP verified by CM? Yes (MARIA M Ellis)    Chart Reviewed: Yes      History Provided by: Patient  Patient Orientation: Alert and Oriented, Person, Place, Situation, Self    Patient Cognition: Alert    Hospitalization in the last 30 days (Readmission):  No    If yes, Readmission Assessment in CM Navigator will be completed.    Advance Directives:      Code Status: Full Code   Patient's Primary Decision Maker is: Legal Next of Kin    Primary Decision Maker: CECILIA VENTURA - Child - [phone]    Secondary Decision Maker: Michelle Trevino - Niece/Nephew - [phone]    Discharge Planning:    Patient lives with: Family Members Type of Home: House  Primary Care Giver: Self  Patient Support Systems include: Children, Family Members   Current Financial resources: Medicare  Current community resources: None  Current services prior to admission: Durable Medical Equipment            Current DME: Cane, Shower Chair, Walker (has bathroom grab bars and rollator)            Type of Home Care services:  None    ADLS  Prior functional level: Independent in ADLs/IADLs  Current functional level: Assistance with the following:, Bathing, Dressing, Toileting, Cooking, Housework, Shopping, Mobility, Other (see comment) (needs assist d/t weakness)    PT AM-PAC: 20 /24  OT AM-PAC: 20 /24    Family can provide  assistance at DC: Yes  Would you like Case Management to discuss the discharge plan with any other family members/significant others, and if so, who? No  Plans to Return to Present Housing: Yes  Other Identified Issues/Barriers to RETURNING to current housing: rodents  Potential Assistance needed at discharge: N/A            Potential DME:    Patient expects to discharge to: House  Plan for transportation at discharge: Family    Financial    Payor: [employer] MEDICARE / Plan: [employer] MEDICARE ADVANTAGE HMO / Product Type: Medicare /     Does insurance require precert for SNF: Yes    Potential assistance Purchasing Medications: No  Meds-to-Beds request: Yes      [facility] [address] [place], [place] - [address] [facility] - P [phone] - F [phone]  [address] [place] [address]  Phone: [phone] Fax: [phone]    [place], [place] - [address] [facility] -  [phone] - F [phone]  [address] [facility] [address]  Phone: [phone] Fax: [phone]      Notes:    Factors facilitating achievement of predicted outcomes: Family support, Motivated, Cooperative, Pleasant, and Has needed Durable Medical Equipment at home    Barriers to discharge: Decreased endurance, Long standing deficits, and rodents in the home    Additional Case Management Notes: plan is to return home independent w/ niece and family will transport home     The Plan for Transition of Care is related to the following treatment goals of Hypokalemia [E87.6]  Primary hypertension [I10]  RADHA (acute kidney injury) [N17.9]    IF APPLICABLE: The Patient and/or patient representative Samantha and her family were provided with a choice of provider and agrees with the discharge plan. Freedom of choice list with basic dialogue that supports the patient's individualized plan of care/goals and shares the quality data associated with the providers was provided to: Patient   Patient Representative Name:       The Patient and/or

## 2025-06-13 LAB
25(OH)D3 SERPL-MCNC: 25.3 NG/ML (ref 30–100)
ANION GAP SERPL CALCULATED.3IONS-SCNC: 10 MMOL/L (ref 9–16)
BASOPHILS # BLD: 0 K/UL (ref 0–0.2)
BASOPHILS NFR BLD: 0 % (ref 0–2)
BUN SERPL-MCNC: 3 MG/DL (ref 8–23)
CALCIUM SERPL-MCNC: 7.9 MG/DL (ref 8.6–10.4)
CHLORIDE SERPL-SCNC: 103 MMOL/L (ref 98–107)
CO2 SERPL-SCNC: 26 MMOL/L (ref 20–31)
CREAT SERPL-MCNC: 0.9 MG/DL (ref 0.6–0.9)
EOSINOPHIL # BLD: 0 K/UL (ref 0–0.44)
EOSINOPHILS RELATIVE PERCENT: 0 % (ref 1–4)
ERYTHROCYTE [DISTWIDTH] IN BLOOD BY AUTOMATED COUNT: 14.5 % (ref 11.8–14.4)
GFR, ESTIMATED: 70 ML/MIN/1.73M2
GLUCOSE SERPL-MCNC: 75 MG/DL (ref 74–99)
HCT VFR BLD AUTO: 38.4 % (ref 36.3–47.1)
HGB BLD-MCNC: 12.3 G/DL (ref 11.9–15.1)
IMM GRANULOCYTES # BLD AUTO: 0 K/UL (ref 0–0.3)
IMM GRANULOCYTES NFR BLD: 0 %
IRON SATN MFR SERPL: 64 % (ref 20–55)
IRON SERPL-MCNC: 82 UG/DL (ref 37–145)
LACTIC ACID, WHOLE BLOOD: 2.4 MMOL/L (ref 0.7–2.1)
LYMPHOCYTES NFR BLD: 1.19 K/UL (ref 1.1–3.7)
LYMPHOCYTES RELATIVE PERCENT: 41 % (ref 24–43)
MAGNESIUM SERPL-MCNC: 1.8 MG/DL (ref 1.6–2.4)
MCH RBC QN AUTO: 35.7 PG (ref 25.2–33.5)
MCHC RBC AUTO-ENTMCNC: 32 G/DL (ref 28.4–34.8)
MCV RBC AUTO: 111.3 FL (ref 82.6–102.9)
MONOCYTES NFR BLD: 0.26 K/UL (ref 0.1–1.2)
MONOCYTES NFR BLD: 9 % (ref 3–12)
MORPHOLOGY: ABNORMAL
MORPHOLOGY: ABNORMAL
NEUTROPHILS NFR BLD: 50 % (ref 36–65)
NEUTS SEG NFR BLD: 1.45 K/UL (ref 1.5–8.1)
NRBC BLD-RTO: 0 PER 100 WBC
PHOSPHATE SERPL-MCNC: 2.4 MG/DL (ref 2.5–4.5)
PLATELET # BLD AUTO: ABNORMAL K/UL (ref 138–453)
PLATELET, FLUORESCENCE: 100 K/UL (ref 138–453)
PLATELETS.RETICULATED NFR BLD AUTO: 5.5 % (ref 1.1–10.3)
POTASSIUM SERPL-SCNC: 3.6 MMOL/L (ref 3.7–5.3)
RBC # BLD AUTO: 3.45 M/UL (ref 3.95–5.11)
SODIUM SERPL-SCNC: 139 MMOL/L (ref 136–145)
TIBC SERPL-MCNC: 129 UG/DL (ref 250–450)
UNSATURATED IRON BINDING CAPACITY: 47 UG/DL (ref 112–347)
WBC OTHER # BLD: 2.9 K/UL (ref 3.5–11.3)

## 2025-06-13 PROCEDURE — 6360000002 HC RX W HCPCS: Performed by: STUDENT IN AN ORGANIZED HEALTH CARE EDUCATION/TRAINING PROGRAM

## 2025-06-13 PROCEDURE — 83550 IRON BINDING TEST: CPT

## 2025-06-13 PROCEDURE — 85055 RETICULATED PLATELET ASSAY: CPT

## 2025-06-13 PROCEDURE — 85025 COMPLETE CBC W/AUTO DIFF WBC: CPT

## 2025-06-13 PROCEDURE — 80048 BASIC METABOLIC PNL TOTAL CA: CPT

## 2025-06-13 PROCEDURE — 99232 SBSQ HOSP IP/OBS MODERATE 35: CPT | Performed by: STUDENT IN AN ORGANIZED HEALTH CARE EDUCATION/TRAINING PROGRAM

## 2025-06-13 PROCEDURE — 83605 ASSAY OF LACTIC ACID: CPT

## 2025-06-13 PROCEDURE — 82306 VITAMIN D 25 HYDROXY: CPT

## 2025-06-13 PROCEDURE — 83735 ASSAY OF MAGNESIUM: CPT

## 2025-06-13 PROCEDURE — 1200000000 HC SEMI PRIVATE

## 2025-06-13 PROCEDURE — 36415 COLL VENOUS BLD VENIPUNCTURE: CPT

## 2025-06-13 PROCEDURE — 6370000000 HC RX 637 (ALT 250 FOR IP): Performed by: STUDENT IN AN ORGANIZED HEALTH CARE EDUCATION/TRAINING PROGRAM

## 2025-06-13 PROCEDURE — 83540 ASSAY OF IRON: CPT

## 2025-06-13 PROCEDURE — 2500000003 HC RX 250 WO HCPCS: Performed by: INTERNAL MEDICINE

## 2025-06-13 PROCEDURE — 6360000002 HC RX W HCPCS: Performed by: INTERNAL MEDICINE

## 2025-06-13 PROCEDURE — 84100 ASSAY OF PHOSPHORUS: CPT

## 2025-06-13 PROCEDURE — 6370000000 HC RX 637 (ALT 250 FOR IP): Performed by: INTERNAL MEDICINE

## 2025-06-13 PROCEDURE — 99232 SBSQ HOSP IP/OBS MODERATE 35: CPT | Performed by: INTERNAL MEDICINE

## 2025-06-13 RX ORDER — MAGNESIUM SULFATE IN WATER 40 MG/ML
2000 INJECTION, SOLUTION INTRAVENOUS ONCE
Status: COMPLETED | OUTPATIENT
Start: 2025-06-13 | End: 2025-06-13

## 2025-06-13 RX ORDER — ERGOCALCIFEROL 1.25 MG/1
50000 CAPSULE, LIQUID FILLED ORAL WEEKLY
Status: DISCONTINUED | OUTPATIENT
Start: 2025-06-13 | End: 2025-06-14 | Stop reason: HOSPADM

## 2025-06-13 RX ADMIN — SODIUM CHLORIDE, PRESERVATIVE FREE 20 MG: 5 INJECTION INTRAVENOUS at 09:07

## 2025-06-13 RX ADMIN — ENOXAPARIN SODIUM 30 MG: 100 INJECTION SUBCUTANEOUS at 21:49

## 2025-06-13 RX ADMIN — GABAPENTIN 300 MG: 300 CAPSULE ORAL at 13:23

## 2025-06-13 RX ADMIN — MULTIVITAMIN TABLET 1 TABLET: TABLET at 09:06

## 2025-06-13 RX ADMIN — ERGOCALCIFEROL 50000 UNITS: 1.25 CAPSULE ORAL at 15:33

## 2025-06-13 RX ADMIN — TAMSULOSIN HYDROCHLORIDE 0.4 MG: 0.4 CAPSULE ORAL at 09:06

## 2025-06-13 RX ADMIN — GABAPENTIN 300 MG: 300 CAPSULE ORAL at 09:06

## 2025-06-13 RX ADMIN — GABAPENTIN 300 MG: 300 CAPSULE ORAL at 21:48

## 2025-06-13 RX ADMIN — MAGNESIUM SULFATE HEPTAHYDRATE 2000 MG: 40 INJECTION, SOLUTION INTRAVENOUS at 10:40

## 2025-06-13 RX ADMIN — CYANOCOBALAMIN TAB 500 MCG 1000 MCG: 500 TAB at 09:06

## 2025-06-13 RX ADMIN — SODIUM CHLORIDE, PRESERVATIVE FREE 20 MG: 5 INJECTION INTRAVENOUS at 21:49

## 2025-06-13 RX ADMIN — LEVOTHYROXINE SODIUM 112 MCG: 112 TABLET ORAL at 09:17

## 2025-06-13 RX ADMIN — DIBASIC SODIUM PHOSPHATE, MONOBASIC POTASSIUM PHOSPHATE AND MONOBASIC SODIUM PHOSPHATE 1 TABLET: 852; 155; 130 TABLET ORAL at 10:37

## 2025-06-13 RX ADMIN — SODIUM CHLORIDE, PRESERVATIVE FREE 10 ML: 5 INJECTION INTRAVENOUS at 21:49

## 2025-06-13 RX ADMIN — ENOXAPARIN SODIUM 30 MG: 100 INJECTION SUBCUTANEOUS at 09:07

## 2025-06-13 RX ADMIN — FOLIC ACID 1 MG: 1 TABLET ORAL at 09:07

## 2025-06-13 RX ADMIN — DIBASIC SODIUM PHOSPHATE, MONOBASIC POTASSIUM PHOSPHATE AND MONOBASIC SODIUM PHOSPHATE 1 TABLET: 852; 155; 130 TABLET ORAL at 21:48

## 2025-06-13 ASSESSMENT — ENCOUNTER SYMPTOMS
EYE DISCHARGE: 0
ABDOMINAL PAIN: 0
COLOR CHANGE: 0
APNEA: 0
EYE PAIN: 0
DIARRHEA: 1
NAUSEA: 0
EYE REDNESS: 0

## 2025-06-13 ASSESSMENT — PAIN SCALES - GENERAL
PAINLEVEL_OUTOF10: 0
PAINLEVEL_OUTOF10: 0

## 2025-06-13 NOTE — PLAN OF CARE
Problem: Discharge Planning  Goal: Discharge to home or other facility with appropriate resources  6/13/2025 1407 by Bing Rivero RN  Outcome: Progressing  6/13/2025 0456 by Roxana Shields RN  Outcome: Progressing     Problem: Safety - Adult  Goal: Free from fall injury  6/13/2025 1407 by Bing Rivero RN  Outcome: Progressing  6/13/2025 0456 by Roxana Shields RN  Outcome: Progressing     Problem: Skin/Tissue Integrity  Goal: Skin integrity remains intact  Description: 1.  Monitor for areas of redness and/or skin breakdown2.  Assess vascular access sites hourly3.  Every 4-6 hours minimum:  Change oxygen saturation probe site4.  Every 4-6 hours:  If on nasal continuous positive airway pressure, respiratory therapy assess nares and determine need for appliance change or resting period  Outcome: Progressing     Problem: ABCDS Injury Assessment  Goal: Absence of physical injury  6/13/2025 1407 by Bing Rivero RN  Outcome: Progressing  6/13/2025 0456 by Roxana Shields RN  Outcome: Progressing     Problem: Pain  Goal: Verbalizes/displays adequate comfort level or baseline comfort level  Outcome: Progressing

## 2025-06-13 NOTE — PROGRESS NOTES
[facility]  Office: [phone]  Peyman Sepulveda, DO, Hilario Lopez, DO, Stanley Lees DO, Michele Liu, DO, Nick Posada MD, Rhonda Newby MD, Chiqui Moncada MD, Maria Antonia Vela MD,  Ashok Skinner MD, Marilyn White MD, Hillary Loco MD,  Thony Iniguez DO, Jacey Lopez MD, Bunny Swann MD, Parker Sepulveda DO, Mily Franco MD,  Ramon Perla DO, Darlyn Nicolas MD, Bianca Catherine MD, Mich Phan MD,  Alexandr Pack MD, Yinka Taylor MD, Marcy Kim MD, Byron Espinal MD, Ggae Gray MD, Clemente Weaver MD, Yuval Yusuf, DO, Shira Cheung MD, Sade Carter DO, Logan Dhillon MD, Thony Champion MD, Mohsin Reza, MD, Nga Villa MD, Shirley Waterhouse, CNP,  Roberta Galvan, CNP, Yuval العراقي, CNP,  Nena Ibarra, WILL, Ruthie Roberts, CNP, Edwige Calabrese, CNP, Stacie Bruno, CNP, Aliyah Payan, CNP, Princess Duffy, PA-C, Katie Pedroza, CNP, Rajesh Carr, CNP,  Roxane Greenberg, CNP, Eladia Curiel, CNP, Stefano Harvey, PA-C, Kimberley Alvarez, CNP,  Nereida Pack, CNS, Dominique Leroy, CNP, Dannielle Ferraro, CNP,   Renea Kenney, CNP         [facility]   IN-PATIENT SERVICE   [facility]    Progress Note    6/12/2025    8:51 PM    Name:   Samantha Hobson  MRN:     [id number]     Acct:      [id number]   Room:   0239/0239-01  IP Day:  2  Admit Date:  6/10/2025  3:46 PM    PCP:   Eladia Arnett APRN - CNP  Code Status:  Full Code    Subjective:     C/C:   Chief Complaint   Patient presents with    Shortness of Breath    Vomiting    Dizziness     Interval History Status:  Seen at bedside, hemodynamically stable, blood pressure stable  Denies any complaints eager to eat advance diet today  Labs reviewed electrolytes replaced    Brief History:     Per HPI  \"Samantha Hobson is a 70 y.o.  female with history of B12 deficiency, cognitive impairment with neuropathy who presents with Shortness of Breath, Vomiting, and Dizziness   and is admitted to the hospital for  bile duct dilation status postcholecystectomy typical of reservoir effect no MR features of stenting or sclerosing cholangitis, no choledocholithiasis or stricture, appreciate GI service recommendations continue to monitor LFTs      Gait imbalance with peripheral neuropathy:  Orthostatic hypotension:  - Patient does have history of chronic neuropathy with prior history of severe B12 deficiency.  -Continues to be on supplementation  - PT/OT  - Continue to monitor blood pressure, continue compression stockings      Rat  infestation.  ???    - ID consulted, appreciate their input , not a picture of leptospirosis    Hypothyroidism continue Synthroid abnormal TSH repeat TSH as outpatient    Folic acid deficiency continue supplementation, follow-up with iron levels  Thrombocytopenia continue to monitor  Lactic acidosis, follow-up with repeat levels    Wound care following for decubitus ulcer/pilonidal cyst      Marcy Raymond Sra, MD  6/12/2025  8:51 PM

## 2025-06-13 NOTE — PLAN OF CARE
Problem: Discharge Planning  Goal: Discharge to home or other facility with appropriate resources  6/13/2025 1450 by Bing Rivero RN  Outcome: Adequate for Discharge  6/13/2025 1407 by Bing Rivero RN  Outcome: Progressing  6/13/2025 0456 by Roxana Shields RN  Outcome: Progressing     Problem: Safety - Adult  Goal: Free from fall injury  6/13/2025 1450 by Bing Rivero RN  Outcome: Adequate for Discharge  6/13/2025 1407 by Bing Rivero RN  Outcome: Progressing  6/13/2025 0456 by Roxana Shields RN  Outcome: Progressing     Problem: Skin/Tissue Integrity  Goal: Skin integrity remains intact  Description: 1.  Monitor for areas of redness and/or skin breakdown2.  Assess vascular access sites hourly3.  Every 4-6 hours minimum:  Change oxygen saturation probe site4.  Every 4-6 hours:  If on nasal continuous positive airway pressure, respiratory therapy assess nares and determine need for appliance change or resting period  6/13/2025 1450 by Bing Rivero RN  Outcome: Adequate for Discharge  6/13/2025 1407 by Bing Rivero RN  Outcome: Progressing     Problem: ABCDS Injury Assessment  Goal: Absence of physical injury  6/13/2025 1450 by Bing Rivero RN  Outcome: Adequate for Discharge  6/13/2025 1407 by Bing Rivero RN  Outcome: Progressing  6/13/2025 0456 by Roxana Shields RN  Outcome: Progressing     Problem: Pain  Goal: Verbalizes/displays adequate comfort level or baseline comfort level  6/13/2025 1450 by Bing Rivero RN  Outcome: Adequate for Discharge  6/13/2025 1407 by Bing Rivero RN  Outcome: Progressing

## 2025-06-13 NOTE — PROGRESS NOTES
[facility] -   Infectious diseases evaluation  admission date 6/10/2025    reason for consultation:   Suspected leptospirosis     Impression :   Current:  Doubt leptospira- this is not the picture  nausea, vomiting, diarrhea x 2 months - worse last 2 weeks  Hypokalemia 2.3  numbness tingling of the calves  and legs   Weakness and falls  M,ild intra and extra hepatic biliary tree dilatation - likely due to past cholecystectomy  Epigastric pain   Doubt UTI  - no dysuria and  contaminated UA w epithelial cells -  No hydronephrosis w  on CT  200 cc urine retention in ER - then  16cc on the floor  Exposure to mice at home  Relative leukopenia - old    Other:    Discussion / summary of stay / plan of care/ Recommendations:     HENCE:   CRP < 3  Urine contaminated w epith cells -U cx neg  Stools for diarrhea ANDERSON  GI PCR ng  C diff neg  Norovirus  Giardia cryptospora PCR neg  Stop all AB for now  OK for DC    Infection Control Recommendations   [place] Precautions    Antimicrobial Stewardship Recommendations   Simplification of therapy  Targeted therapy    History of Present Illness:   Initial history:  Samantha Hobson is a 70 y.o.-year-old female with past medical history of  Hypertension  Hypothyroidism  Chronic back pain  Cognitive impairment    Patient came in with a complaint of nausea and vomiting that has been going on over the past 2 weeks.  It was initially intermittent and now it has become persistent.  Patient has history of chronic neuropathy and takes Flexeril for relief.  But this past 2 weeks she is having increased myalgias in the calfs along with the pins and needle sensation.  She feels that she is having imbalance on walking.  Patient states that her house is full of rats and she is unable to get rid of them.    Labs in ER was concerning for hypokalemia 2.3, creatinine elevation likely due to dehydration, hypophosphatemia 2, folate 2.4(low), lactic acidosis

## 2025-06-13 NOTE — PROGRESS NOTES
Pt ate 1/2 grilled cheese and ate 4 french fries and tolerated without nausea or emesis. About 1 hour later pt drank milk and had emesis of just the milk pt states. Pt states milk did not sit right with her , She feels fine now

## 2025-06-13 NOTE — PROGRESS NOTES
Pt refused to eat lunch Pt stated I'm not hungry. Explained to pt that  will not dc her till she is able to keep regular food down. Pt states Im going home no matter what

## 2025-06-13 NOTE — PLAN OF CARE
Problem: Discharge Planning  Goal: Discharge to home or other facility with appropriate resources  6/13/2025 0456 by Roxana Shields RN  Outcome: Progressing     Problem: Safety - Adult  Goal: Free from fall injury  6/13/2025 0456 by Roxana Shields RN  Outcome: Progressing     Problem: ABCDS Injury Assessment  Goal: Absence of physical injury  6/13/2025 0456 by Roxana Shields RN  Outcome: Progressing

## 2025-06-13 NOTE — PROGRESS NOTES
Pt had food like emesis . Pt sttes she feels fine now that her breakfast did not sit well. Denies nausea at present. Pt states I feel fine I still want to go home.  notified dr khoury of above

## 2025-06-13 NOTE — CARE COORDINATION
Case Management   Daily Progress Note       Patient Name: Samantha Hobson                   YOB: 1955  Diagnosis: Hypokalemia [E87.6]  Primary hypertension [I10]  RADHA (acute kidney injury) [N17.9]                       GMLOS: 3.6 days  Length of Stay: 3  days    Anticipated Discharge Date: One day until discharge    Readmission Risk (Low < 19, Mod (19-27), High > 27): Readmission Risk Score: 13.3        Current Transitional Plan    [x] Home Independently    [] Home with HC    [] Skilled Nursing Facility    [] Acute Rehabilitation    [] Long Term Acute Care (LTAC)    [] Other:     Plan for the Stay (Medical Management) :          Workflow Continuation (Additional Notes) :Not ready for discharge d/t N/V. Plan is home with family they will transport.         SHANIQUE VAZQUEZ RN  June 13, 2025

## 2025-06-13 NOTE — PROGRESS NOTES
[facility]  Office: [phone]  Peyman Sepulveda, DO, Hilario Lopez, DO, Stanley Lees DO, Michele Liu, DO, Nick Posada MD, Rhonda Newby MD, Chiqui Moncada MD, Maria Antonia eVla MD,  Ashok Skinner MD, Marilyn White MD, Hillary Loco MD,  Thony Iniguez DO, Jacey Lopez MD, Bunny Swann MD, Parker Sepulveda DO, Mily Franco MD,  Ramon Perla DO, Darlyn Nicolas MD, Bianca Catherine MD, Mich Phan MD,  Alexandr Pack MD, Yinka Taylor MD, Marcy Kim MD, Byron Espinal MD, Gage Gray MD, Clemente Weaver MD, Yuval Yusuf, DO, Shira Cheung MD, Sade Carter DO, Logan Dhillon MD, Thony Champion MD, Mohsin Reza, MD, Nga Villa MD, Shirley Waterhouse, CNP,  Roberta Galvan, CNP, Yuval العراقي, CNP,  Nena Ibarra, WILL, Ruthie Roberts, CNP, Edwige Calabrese, CNP, Stacie Bruno, CNP, Aliyah Payan, CNP, Princess Duffy, PA-C, Katie Pedroza, CNP, Rajesh Carr, CNP,  Roxane Greenberg, CNP, Eladia Curiel, CNP, Stefano Harvey, PA-C, Kimberley Alvarez, CNP,  Nereida Pack, CNS, Dominique Leroy, CNP, Dannielle Ferraro, CNP,   Renea Kenney, CNP         [facility]   IN-PATIENT SERVICE   [facility]    Progress Note    6/13/2025    5:08 PM    Name:   Samantah Hobson  MRN:     [id number]     Acct:      [id number]   Room:   0239/0239-01  IP Day:  3  Admit Date:  6/10/2025  3:46 PM    PCP:   Eladia Arnett APRN - CNP  Code Status:  Full Code    Subjective:     C/C:   Chief Complaint   Patient presents with    Shortness of Breath    Vomiting    Dizziness     Interval History Status:  Seen at bedside, hemodynamically stable, blood pressure improved  Eager to go home however had episode of emesis after breakfast and has not eaten lunch  Labs reviewed electrolytes replaced, started on vitamin D supplementation    Brief History:     Per HPI  \"Samantha Hobson is a 70 y.o.  female with history of B12 deficiency, cognitive impairment with neuropathy who presents with Shortness    Hypocalcemia  Hypophosphatemia  Replace electrolytes as needed, started on vitamin D supplementation      Concern for cholangitis on admission  - Status post MRI/MRCP mild intra and extrahepatic bile duct dilation status postcholecystectomy typical of reservoir effect no MR features of stenting or sclerosing cholangitis, no choledocholithiasis or stricture, appreciate GI service recommendations .      Gait imbalance with peripheral neuropathy:  Orthostatic hypotension:  - Patient does have history of chronic neuropathy with prior history of severe B12 deficiency.  -Continues to be on supplementation  - PT/OT  - Continue to monitor blood pressure, continue compression stockings if patient agrees      Rat  infestation.  ???    - ID consulted, appreciate their input , not a picture of leptospirosis    Hypothyroidism continue Synthroid abnormal TSH repeat TSH as outpatient    Folic acid deficiency continue supplementation  Thrombocytopenia/leukopenia likely dilutional effect continue to monitor  Lactic acidosis, follow-up with repeat levels    Wound care following for decubitus ulcer/pilonidal cyst      Marcy Raymond Sra, MD  6/13/2025  5:08 PM

## 2025-06-14 ENCOUNTER — APPOINTMENT (OUTPATIENT)
Age: 70
End: 2025-06-14
Attending: INTERNAL MEDICINE
Payer: MEDICARE

## 2025-06-14 VITALS
HEART RATE: 71 BPM | WEIGHT: 225.97 LBS | DIASTOLIC BLOOD PRESSURE: 65 MMHG | RESPIRATION RATE: 16 BRPM | BODY MASS INDEX: 32.42 KG/M2 | SYSTOLIC BLOOD PRESSURE: 118 MMHG | TEMPERATURE: 97.7 F | OXYGEN SATURATION: 97 %

## 2025-06-14 LAB
ANION GAP SERPL CALCULATED.3IONS-SCNC: 11 MMOL/L (ref 9–16)
BASOPHILS # BLD: <0.03 K/UL (ref 0–0.2)
BASOPHILS NFR BLD: 0 % (ref 0–2)
BUN SERPL-MCNC: 2 MG/DL (ref 8–23)
CALCIUM SERPL-MCNC: 7.8 MG/DL (ref 8.6–10.4)
CHLORIDE SERPL-SCNC: 102 MMOL/L (ref 98–107)
CO2 SERPL-SCNC: 27 MMOL/L (ref 20–31)
CREAT SERPL-MCNC: 0.8 MG/DL (ref 0.6–0.9)
EOSINOPHIL # BLD: <0.03 K/UL (ref 0–0.44)
EOSINOPHILS RELATIVE PERCENT: 0 % (ref 1–4)
ERYTHROCYTE [DISTWIDTH] IN BLOOD BY AUTOMATED COUNT: 14.6 % (ref 11.8–14.4)
GFR, ESTIMATED: 80 ML/MIN/1.73M2
GLUCOSE SERPL-MCNC: 71 MG/DL (ref 74–99)
HCT VFR BLD AUTO: 39.9 % (ref 36.3–47.1)
HGB BLD-MCNC: 13.3 G/DL (ref 11.9–15.1)
IMM GRANULOCYTES # BLD AUTO: <0.03 K/UL (ref 0–0.3)
IMM GRANULOCYTES NFR BLD: 1 %
LACTIC ACID, WHOLE BLOOD: 1.7 MMOL/L (ref 0.7–2.1)
LYMPHOCYTES NFR BLD: 1.34 K/UL (ref 1.1–3.7)
LYMPHOCYTES RELATIVE PERCENT: 44 % (ref 24–43)
MAGNESIUM SERPL-MCNC: 1.9 MG/DL (ref 1.6–2.4)
MCH RBC QN AUTO: 36.1 PG (ref 25.2–33.5)
MCHC RBC AUTO-ENTMCNC: 33.3 G/DL (ref 28.4–34.8)
MCV RBC AUTO: 108.4 FL (ref 82.6–102.9)
MONOCYTES NFR BLD: 0.29 K/UL (ref 0.1–1.2)
MONOCYTES NFR BLD: 10 % (ref 3–12)
NEUTROPHILS NFR BLD: 45 % (ref 36–65)
NEUTS SEG NFR BLD: 1.37 K/UL (ref 1.5–8.1)
NRBC BLD-RTO: 0 PER 100 WBC
PHOSPHATE SERPL-MCNC: 2.7 MG/DL (ref 2.5–4.5)
PLATELET # BLD AUTO: ABNORMAL K/UL (ref 138–453)
PLATELET, FLUORESCENCE: 100 K/UL (ref 138–453)
PLATELETS.RETICULATED NFR BLD AUTO: 4.5 % (ref 1.1–10.3)
POTASSIUM SERPL-SCNC: 3.4 MMOL/L (ref 3.7–5.3)
RBC # BLD AUTO: 3.68 M/UL (ref 3.95–5.11)
RBC # BLD: ABNORMAL 10*6/UL
RBC # BLD: ABNORMAL 10*6/UL
SODIUM SERPL-SCNC: 140 MMOL/L (ref 136–145)
WBC OTHER # BLD: 3 K/UL (ref 3.5–11.3)

## 2025-06-14 PROCEDURE — 6370000000 HC RX 637 (ALT 250 FOR IP): Performed by: STUDENT IN AN ORGANIZED HEALTH CARE EDUCATION/TRAINING PROGRAM

## 2025-06-14 PROCEDURE — 80048 BASIC METABOLIC PNL TOTAL CA: CPT

## 2025-06-14 PROCEDURE — 99239 HOSP IP/OBS DSCHRG MGMT >30: CPT | Performed by: STUDENT IN AN ORGANIZED HEALTH CARE EDUCATION/TRAINING PROGRAM

## 2025-06-14 PROCEDURE — 85025 COMPLETE CBC W/AUTO DIFF WBC: CPT

## 2025-06-14 PROCEDURE — 83735 ASSAY OF MAGNESIUM: CPT

## 2025-06-14 PROCEDURE — 84100 ASSAY OF PHOSPHORUS: CPT

## 2025-06-14 PROCEDURE — 36415 COLL VENOUS BLD VENIPUNCTURE: CPT

## 2025-06-14 PROCEDURE — 6360000002 HC RX W HCPCS: Performed by: INTERNAL MEDICINE

## 2025-06-14 PROCEDURE — 6370000000 HC RX 637 (ALT 250 FOR IP): Performed by: INTERNAL MEDICINE

## 2025-06-14 PROCEDURE — 2500000003 HC RX 250 WO HCPCS: Performed by: INTERNAL MEDICINE

## 2025-06-14 PROCEDURE — 83605 ASSAY OF LACTIC ACID: CPT

## 2025-06-14 PROCEDURE — 85055 RETICULATED PLATELET ASSAY: CPT

## 2025-06-14 RX ORDER — FOLIC ACID 1 MG/1
1 TABLET ORAL DAILY
Qty: 30 TABLET | Refills: 3 | Status: SHIPPED | OUTPATIENT
Start: 2025-06-15

## 2025-06-14 RX ORDER — POTASSIUM CHLORIDE 750 MG/1
10 TABLET, EXTENDED RELEASE ORAL 2 TIMES DAILY
Qty: 60 TABLET | Refills: 0 | Status: SHIPPED | OUTPATIENT
Start: 2025-06-14 | End: 2025-06-19 | Stop reason: SDUPTHER

## 2025-06-14 RX ORDER — ERGOCALCIFEROL 1.25 MG/1
50000 CAPSULE ORAL WEEKLY
Qty: 5 CAPSULE | Refills: 0 | Status: SHIPPED | OUTPATIENT
Start: 2025-06-20 | End: 2025-07-19

## 2025-06-14 RX ORDER — POTASSIUM CHLORIDE 1500 MG/1
40 TABLET, EXTENDED RELEASE ORAL ONCE
Status: DISCONTINUED | OUTPATIENT
Start: 2025-06-14 | End: 2025-06-14 | Stop reason: HOSPADM

## 2025-06-14 RX ADMIN — MULTIVITAMIN TABLET 1 TABLET: TABLET at 08:42

## 2025-06-14 RX ADMIN — FOLIC ACID 1 MG: 1 TABLET ORAL at 08:42

## 2025-06-14 RX ADMIN — SODIUM CHLORIDE, PRESERVATIVE FREE 20 MG: 5 INJECTION INTRAVENOUS at 08:42

## 2025-06-14 RX ADMIN — ENOXAPARIN SODIUM 30 MG: 100 INJECTION SUBCUTANEOUS at 08:42

## 2025-06-14 RX ADMIN — DIBASIC SODIUM PHOSPHATE, MONOBASIC POTASSIUM PHOSPHATE AND MONOBASIC SODIUM PHOSPHATE 1 TABLET: 852; 155; 130 TABLET ORAL at 08:42

## 2025-06-14 RX ADMIN — POTASSIUM CHLORIDE 40 MEQ: 1500 TABLET, EXTENDED RELEASE ORAL at 10:19

## 2025-06-14 RX ADMIN — CYANOCOBALAMIN TAB 500 MCG 1000 MCG: 500 TAB at 08:42

## 2025-06-14 RX ADMIN — LEVOTHYROXINE SODIUM 112 MCG: 112 TABLET ORAL at 08:42

## 2025-06-14 RX ADMIN — GABAPENTIN 300 MG: 300 CAPSULE ORAL at 08:37

## 2025-06-14 RX ADMIN — TAMSULOSIN HYDROCHLORIDE 0.4 MG: 0.4 CAPSULE ORAL at 08:42

## 2025-06-14 ASSESSMENT — PAIN SCALES - GENERAL: PAINLEVEL_OUTOF10: 0

## 2025-06-14 NOTE — PROGRESS NOTES
Pt dc to front door per wc Pt left with all her belongings. Pt dc to home by private car and family

## 2025-06-14 NOTE — DISCHARGE INSTRUCTIONS
Continue taking medications as prescribed, follow up with PCP within 1 week, get thyroid numbers tested as outpatient.  Return to Emergency if:  Severe nausea vomiting or diarrhea   If pain returns  Call 911 for chest pain or shortness of breath

## 2025-06-14 NOTE — PROGRESS NOTES
Pt given dc instructions and verbalized understanding. Pts daughter to pick her up. Waiting on meds to beds

## 2025-06-14 NOTE — PROGRESS NOTES
CLINICAL PHARMACY NOTE: MEDS TO BEDS    Total # of Prescriptions Filled: 3   The following medications were delivered to the patient:  Potassium chloride   Vitamin D2  Folic acid 1 mg    Additional Documentation:   Delivered to pt on 6/14 at 12:14 pm. Pt had copay of 16.01 and paid with cash.

## 2025-06-14 NOTE — PROGRESS NOTES
Pt ate all her eggs and 1/2 of her sausage and biscuits Pt denies nausea abd pain or emesis. Pt states I'm ready to go home

## 2025-06-14 NOTE — DISCHARGE SUMMARY
Hospital Course: 70-year-old female who was apparently brought for shortness of breath, vomiting, dizziness however patient said that she never had the symptoms states that she had lower extremity weakness/cramping found to be hypokalemic, RADHA started on IV fluids on electrolyte supplementation concern for UTI on admission however urine culture negative, also found to have hypomagnesemia/hypocalcemia/hypophosphatemia replaced also started on vitamin D supplementation, concern for cholangitis on admission s/p MRI/MRCP with mild intra and extrahepatic bile duct dilation s/p cholecystectomy typical of reservoir effect no MR features of stenting or sclerosing cholangitis, no choledocholithiasis or stricture, seen by GI, no further recommendations  Concern for orthostatic hypotension recommended wearing compression stockings started on folic acid supplementation for deficiency   Seen by ID for rat infestation no concern for leptospirosis  Does have history of hypothyroidism on Synthroid concern for noncompliance with abnormal TSH recommended repeat TSH as outpatient  Thrombocytopenia/leukopenia likely delusional effect from fluids  Lactic acidosis resolved    Currently her symptoms have improved denies any shortness of breath, vomiting, dizziness states that leg cramping has improved currently stable for discharge          Significant therapeutic interventions: As described above    Significant Diagnostic Studies:   Labs / Micro:  CBC:   Lab Results   Component Value Date/Time    WBC 3.0 06/14/2025 09:07 AM    RBC 3.68 06/14/2025 09:07 AM    HGB 13.3 06/14/2025 09:07 AM    HCT 39.9 06/14/2025 09:07 AM    .4 06/14/2025 09:07 AM    MCH 36.1 06/14/2025 09:07 AM    MCHC 33.3 06/14/2025 09:07 AM    RDW 14.6 06/14/2025 09:07 AM    PLT See Reflexed IPF Result 06/14/2025 09:07 AM     BMP:    Lab Results   Component Value Date/Time    GLUCOSE 71 06/14/2025 09:07 AM     06/14/2025 09:07 AM    K 3.4 06/14/2025  Results   Component Value Date/Time    TSH 0.04 06/10/2025 05:36 PM        Radiology:  MRI ABDOMEN W WO CONTRAST MRCP  Result Date: 6/11/2025  EXAMINATION: MRI OF THE ABDOMEN WITH AND WITHOUT CONTRAST AND MRCP 6/11/2025 1:22 pm TECHNIQUE: Multiplanar multisequence MRI of the abdomen was performed with and without the administration of intravenous contrast.  After initial T2 axial and coronal images, thick slab, thin slab and 3D coronal MRCP sequences were obtained without the administration of intravenous contrast.  MIP images are provided for review. COMPARISON: CT abdomen performed earlier today. HISTORY: Suspect cholangitis, presents with abdominal pain, prior cholecystectomy FINDINGS: TECHNICAL: Images are moderately degraded, resulting from motion during study acquisition, blurring detail and resulting in misregistration artifact. LIVER: Normal signal characteristics and morphology.  No discrete hepatic lesion.  No intrahepatic bile duct dilatation. GALLBLADDER: Status post cholecystectomy. OTHER ORGANS: The spleen and adrenal glands appear unremarkable. Pancreas and kidneys appear unremarkable. MRCP: The common bile duct measures 10 mm.  No signal void filling defect (calculus/stone) or stricture involving the biliary tree. The pancreatic duct measures 2-3 mm, also unremarkable in appearance. PERITONEUM/RETROPERITONEUM: Unremarkable appearance of the aorta.  No aneurysm.  Unremarkable appearance of the IVC. No adenopathy or fluid. BONES/SOFT TISSUES: Unremarkable appearance. LOWER CHEST: Visualized portions of the lungs are clear.  Cardiac and posterior mediastinal structures visualized are unremarkable. 3D images:  3D reconstructed images were performed on a separate workstation and provided for review.  These images were reviewed.     1.  Mild intra and extrahepatic bile duct dilatation status post cholecystectomy typical of reservoir effect. 2. No choledocholithiasis or stricture evident. 3. No MR features

## 2025-06-15 LAB
MICROORGANISM SPEC CULT: NORMAL
SERVICE CMNT-IMP: NORMAL
SPECIMEN DESCRIPTION: NORMAL

## 2025-06-19 ENCOUNTER — HOSPITAL ENCOUNTER (OUTPATIENT)
Age: 70
Setting detail: SPECIMEN
Discharge: HOME | End: 2025-06-19

## 2025-06-19 DIAGNOSIS — D50.8 OTHER IRON DEFICIENCY ANEMIA: ICD-10-CM

## 2025-06-19 DIAGNOSIS — E87.6 HYPOKALEMIA: ICD-10-CM

## 2025-06-19 DIAGNOSIS — E83.42 HYPOMAGNESEMIA: ICD-10-CM

## 2025-06-19 PROBLEM — N18.31 STAGE 3A CHRONIC KIDNEY DISEASE (HCC): Status: ACTIVE | Noted: 2025-06-19

## 2025-06-19 PROBLEM — M79.604 BILATERAL LEG PAIN: Status: ACTIVE | Noted: 2025-06-19

## 2025-06-19 PROBLEM — M79.605 BILATERAL LEG PAIN: Status: ACTIVE | Noted: 2025-06-19

## 2025-06-19 LAB
ANION GAP SERPL CALCULATED.3IONS-SCNC: 12 MMOL/L (ref 9–16)
BUN SERPL-MCNC: 5 MG/DL (ref 8–23)
CALCIUM SERPL-MCNC: 9.6 MG/DL (ref 8.6–10.4)
CHLORIDE SERPL-SCNC: 100 MMOL/L (ref 98–107)
CO2 SERPL-SCNC: 28 MMOL/L (ref 20–31)
CREAT SERPL-MCNC: 1 MG/DL (ref 0.6–0.9)
ERYTHROCYTE [DISTWIDTH] IN BLOOD BY AUTOMATED COUNT: 14.8 % (ref 11.8–14.4)
GFR, ESTIMATED: 61 ML/MIN/1.73M2
GLUCOSE SERPL-MCNC: 98 MG/DL (ref 74–99)
HCT VFR BLD AUTO: 48.8 % (ref 36.3–47.1)
HGB BLD-MCNC: 15.8 G/DL (ref 11.9–15.1)
MAGNESIUM SERPL-MCNC: 2 MG/DL (ref 1.6–2.4)
MCH RBC QN AUTO: 36.2 PG (ref 25.2–33.5)
MCHC RBC AUTO-ENTMCNC: 32.4 G/DL (ref 28.4–34.8)
MCV RBC AUTO: 111.9 FL (ref 82.6–102.9)
NRBC BLD-RTO: 0 PER 100 WBC
PLATELET # BLD AUTO: 188 K/UL (ref 138–453)
PMV BLD AUTO: 11.8 FL (ref 8.1–13.5)
POTASSIUM SERPL-SCNC: 5.4 MMOL/L (ref 3.7–5.3)
RBC # BLD AUTO: 4.36 M/UL (ref 3.95–5.11)
SODIUM SERPL-SCNC: 140 MMOL/L (ref 136–145)
WBC OTHER # BLD: 4.4 K/UL (ref 3.5–11.3)

## 2025-07-02 LAB
EKG ATRIAL RATE: 78 BPM
EKG P AXIS: 39 DEGREES
EKG P-R INTERVAL: 204 MS
EKG Q-T INTERVAL: 378 MS
EKG QRS DURATION: 80 MS
EKG QTC CALCULATION (BAZETT): 430 MS
EKG R AXIS: 13 DEGREES
EKG T AXIS: -31 DEGREES
EKG VENTRICULAR RATE: 78 BPM

## 2025-07-15 ENCOUNTER — HOSPITAL ENCOUNTER (OUTPATIENT)
Age: 70
Setting detail: SPECIMEN
Discharge: HOME | End: 2025-07-15

## 2025-07-15 DIAGNOSIS — E78.2 MIXED HYPERLIPIDEMIA: ICD-10-CM

## 2025-07-15 DIAGNOSIS — E53.8 VITAMIN B 12 DEFICIENCY: ICD-10-CM

## 2025-07-15 DIAGNOSIS — I10 PRIMARY HYPERTENSION: ICD-10-CM

## 2025-07-15 DIAGNOSIS — R73.9 HYPERGLYCEMIA: ICD-10-CM

## 2025-07-15 DIAGNOSIS — E03.9 ACQUIRED HYPOTHYROIDISM: ICD-10-CM

## 2025-07-15 DIAGNOSIS — D50.8 OTHER IRON DEFICIENCY ANEMIA: ICD-10-CM

## 2025-07-15 DIAGNOSIS — E55.9 VITAMIN D DEFICIENCY: ICD-10-CM

## 2025-07-15 LAB
25(OH)D3 SERPL-MCNC: 41.7 NG/ML (ref 30–100)
ALBUMIN SERPL-MCNC: 3.9 G/DL (ref 3.5–5.2)
ALBUMIN/GLOB SERPL: 1.3 {RATIO} (ref 1–2.5)
ALP SERPL-CCNC: 73 U/L (ref 35–104)
ALT SERPL-CCNC: 27 U/L (ref 10–35)
ANION GAP SERPL CALCULATED.3IONS-SCNC: 12 MMOL/L (ref 9–16)
AST SERPL-CCNC: 39 U/L (ref 10–35)
BILIRUB SERPL-MCNC: 0.9 MG/DL (ref 0–1.2)
BUN SERPL-MCNC: 9 MG/DL (ref 8–23)
CALCIUM SERPL-MCNC: 9.9 MG/DL (ref 8.6–10.4)
CHLORIDE SERPL-SCNC: 102 MMOL/L (ref 98–107)
CHOLEST SERPL-MCNC: 205 MG/DL (ref 0–199)
CHOLESTEROL/HDL RATIO: 4.1
CO2 SERPL-SCNC: 28 MMOL/L (ref 20–31)
CREAT SERPL-MCNC: 0.6 MG/DL (ref 0.6–0.9)
ERYTHROCYTE [DISTWIDTH] IN BLOOD BY AUTOMATED COUNT: 12.5 % (ref 11.8–14.4)
EST. AVERAGE GLUCOSE BLD GHB EST-MCNC: 71 MG/DL
GFR, ESTIMATED: >90 ML/MIN/1.73M2
GLUCOSE SERPL-MCNC: 91 MG/DL (ref 74–99)
HBA1C MFR BLD: 4.1 % (ref 4–6)
HCT VFR BLD AUTO: 47.2 % (ref 36.3–47.1)
HDLC SERPL-MCNC: 50 MG/DL
HGB BLD-MCNC: 14.9 G/DL (ref 11.9–15.1)
IRON SERPL-MCNC: 83 UG/DL (ref 37–145)
LDLC SERPL CALC-MCNC: 133 MG/DL (ref 0–100)
MCH RBC QN AUTO: 34.5 PG (ref 25.2–33.5)
MCHC RBC AUTO-ENTMCNC: 31.6 G/DL (ref 28.4–34.8)
MCV RBC AUTO: 109.3 FL (ref 82.6–102.9)
NRBC BLD-RTO: 0 PER 100 WBC
PLATELET # BLD AUTO: ABNORMAL K/UL (ref 138–453)
PLATELET, FLUORESCENCE: 112 K/UL (ref 138–453)
PLATELETS.RETICULATED NFR BLD AUTO: 17.3 % (ref 1.1–10.3)
POTASSIUM SERPL-SCNC: 3.9 MMOL/L (ref 3.7–5.3)
PROT SERPL-MCNC: 7 G/DL (ref 6.6–8.7)
RBC # BLD AUTO: 4.32 M/UL (ref 3.95–5.11)
SODIUM SERPL-SCNC: 142 MMOL/L (ref 136–145)
T4 FREE SERPL-MCNC: 1.6 NG/DL (ref 0.9–1.7)
TRIGL SERPL-MCNC: 109 MG/DL
TSH SERPL DL<=0.05 MIU/L-ACNC: 0.04 UIU/ML (ref 0.27–4.2)
VIT B12 SERPL-MCNC: 171 PG/ML (ref 232–1245)
VLDLC SERPL CALC-MCNC: 22 MG/DL (ref 1–30)
WBC OTHER # BLD: 4.5 K/UL (ref 3.5–11.3)

## 2025-07-24 ENCOUNTER — HOSPITAL ENCOUNTER (OUTPATIENT)
Dept: NUCLEAR MEDICINE | Age: 70
Discharge: HOME | End: 2025-07-26
Payer: MEDICARE

## 2025-07-24 ENCOUNTER — HOSPITAL ENCOUNTER (OUTPATIENT)
Age: 70
Discharge: HOME | End: 2025-07-26
Payer: MEDICARE

## 2025-07-24 DIAGNOSIS — R94.31 ABNORMAL ELECTROCARDIOGRAPHY: ICD-10-CM

## 2025-07-24 LAB
STRESS BASELINE HR: 71 BPM
STRESS ESTIMATED WORKLOAD: 1 METS
STRESS PEAK DIAS BP: 82 MMHG
STRESS PEAK SYS BP: 139 MMHG
STRESS PERCENT HR ACHIEVED: 61 %
STRESS POST PEAK HR: 93 BPM
STRESS RATE PRESSURE PRODUCT: NORMAL BPM*MMHG
STRESS STAGE RECOVERY 1 BP: NORMAL MMHG
STRESS STAGE RECOVERY 1 DURATION: 1 MIN:SEC
STRESS STAGE RECOVERY 1 HR: 85 BPM
STRESS STAGE RECOVERY 2 BP: NORMAL MMHG
STRESS STAGE RECOVERY 2 DURATION: 2 MIN:SEC
STRESS STAGE RECOVERY 2 HR: 86 BPM
STRESS STAGE RECOVERY 3 BP: NORMAL MMHG
STRESS STAGE RECOVERY 3 DURATION: 4 MIN:SEC
STRESS STAGE RECOVERY 3 HR: 78 BPM
STRESS TARGET HR: 152 BPM

## 2025-07-24 PROCEDURE — 6360000002 HC RX W HCPCS: Performed by: NURSE PRACTITIONER

## 2025-07-24 PROCEDURE — 3430000000 HC RX DIAGNOSTIC RADIOPHARMACEUTICAL: Performed by: NURSE PRACTITIONER

## 2025-07-24 PROCEDURE — 93017 CV STRESS TEST TRACING ONLY: CPT

## 2025-07-24 PROCEDURE — 78452 HT MUSCLE IMAGE SPECT MULT: CPT

## 2025-07-24 PROCEDURE — A9500 TC99M SESTAMIBI: HCPCS | Performed by: NURSE PRACTITIONER

## 2025-07-24 RX ORDER — TETRAKIS(2-METHOXYISOBUTYLISOCYANIDE)COPPER(I) TETRAFLUOROBORATE 1 MG/ML
30 INJECTION, POWDER, LYOPHILIZED, FOR SOLUTION INTRAVENOUS
Status: COMPLETED | OUTPATIENT
Start: 2025-07-24 | End: 2025-07-24

## 2025-07-24 RX ORDER — AMINOPHYLLINE 25 MG/ML
50 INJECTION, SOLUTION INTRAVENOUS PRN
Status: ACTIVE | OUTPATIENT
Start: 2025-07-24 | End: 2025-07-24

## 2025-07-24 RX ORDER — REGADENOSON 0.08 MG/ML
0.4 INJECTION, SOLUTION INTRAVENOUS
Status: COMPLETED | OUTPATIENT
Start: 2025-07-24 | End: 2025-07-24

## 2025-07-24 RX ORDER — NITROGLYCERIN 0.4 MG/1
0.4 TABLET SUBLINGUAL EVERY 5 MIN PRN
Status: ACTIVE | OUTPATIENT
Start: 2025-07-24 | End: 2025-07-24

## 2025-07-24 RX ORDER — SODIUM CHLORIDE 9 MG/ML
500 INJECTION, SOLUTION INTRAVENOUS CONTINUOUS PRN
Status: ACTIVE | OUTPATIENT
Start: 2025-07-24 | End: 2025-07-24

## 2025-07-24 RX ORDER — SODIUM CHLORIDE 0.9 % (FLUSH) 0.9 %
5-40 SYRINGE (ML) INJECTION PRN
Status: ACTIVE | OUTPATIENT
Start: 2025-07-24 | End: 2025-07-24

## 2025-07-24 RX ORDER — ALBUTEROL SULFATE 90 UG/1
2 INHALANT RESPIRATORY (INHALATION) PRN
Status: ACTIVE | OUTPATIENT
Start: 2025-07-24 | End: 2025-07-24

## 2025-07-24 RX ORDER — ATROPINE SULFATE 0.1 MG/ML
0.5 INJECTION INTRAVENOUS EVERY 5 MIN PRN
Status: ACTIVE | OUTPATIENT
Start: 2025-07-24 | End: 2025-07-24

## 2025-07-24 RX ORDER — METOPROLOL TARTRATE 1 MG/ML
5 INJECTION, SOLUTION INTRAVENOUS EVERY 5 MIN PRN
Status: ACTIVE | OUTPATIENT
Start: 2025-07-24 | End: 2025-07-24

## 2025-07-24 RX ORDER — TETRAKIS(2-METHOXYISOBUTYLISOCYANIDE)COPPER(I) TETRAFLUOROBORATE 1 MG/ML
10 INJECTION, POWDER, LYOPHILIZED, FOR SOLUTION INTRAVENOUS
Status: COMPLETED | OUTPATIENT
Start: 2025-07-24 | End: 2025-07-24

## 2025-07-24 RX ADMIN — Medication 32.8 MILLICURIE: at 08:47

## 2025-07-24 RX ADMIN — REGADENOSON 0.4 MG: 0.08 INJECTION, SOLUTION INTRAVENOUS at 08:44

## 2025-07-24 RX ADMIN — Medication 10.6 MILLICURIE: at 07:15

## (undated) DEVICE — DRAPE,U/ SHT,SPLIT,PLAS,STERIL: Brand: MEDLINE

## (undated) DEVICE — SUTURE VCRL SZ 0 L36IN ABSRB UD L36MM CT-1 1/2 CIR J946H

## (undated) DEVICE — Device

## (undated) DEVICE — SUTURE PDS II SZ 0 L36IN ABSRB VLT L36MM CT-1 1/2 CIR Z346H

## (undated) DEVICE — OSCILLATING TIP SAW CARTRIDGE: Brand: PRECISION FALCON

## (undated) DEVICE — SST TWIST DRILL, AO TYPE, 2.7MM DIA. X 75MM: Brand: MICROAIRE®

## (undated) DEVICE — PRECISION THIN (9.0 X 0.38 X 31.0MM)

## (undated) DEVICE — SUTURE STRATAFIX SPRL SZ 3-0 L5IN ABSRB UD FS L26MM 3/8 CIR SXMP2B411

## (undated) DEVICE — CONTAINER,SPECIMEN,OR STERILE,4OZ: Brand: MEDLINE

## (undated) DEVICE — ZIPPERED TOGA, 2X LARGE: Brand: FLYTE, SURGICOOL

## (undated) DEVICE — COVER,TABLE,HEAVY DUTY,50"X90",STRL: Brand: MEDLINE

## (undated) DEVICE — SUTURE PDS II SZ 0 L60IN ABSRB VLT L65MM TP-1 1/2 CIR Z991G

## (undated) DEVICE — SCREW BNE HEX HD 3.5 MM
Type: IMPLANTABLE DEVICE | Site: KNEE | Status: NON-FUNCTIONAL
Removed: 2023-08-07

## (undated) DEVICE — DRAPE,REIN 53X77,STERILE: Brand: MEDLINE

## (undated) DEVICE — ELECTRODE PT RET AD L9FT HI MOIST COND ADH HYDRGEL CORDED

## (undated) DEVICE — SUTURE STRATAFIX SPRL SZ 1 L14IN ABSRB VLT L48CM CTX 1/2 SXPD2B405

## (undated) DEVICE — APPLICATOR MEDICATED 26 CC SOLUTION HI LT ORNG CHLORAPREP

## (undated) DEVICE — SUTURE VCRL SZ 1 L36IN ABSRB UD L36MM CT-1 1/2 CIR J947H

## (undated) DEVICE — STOCKINETTE,IMPERVIOUS,12X48,STERILE: Brand: MEDLINE

## (undated) DEVICE — BLADE SAW W12.5XL70MM THK0.8MM CUT THK1.12MM S STL RECIP

## (undated) DEVICE — CEMENT MIXING SYSTEM WITH FEMORAL BREAKWAY NOZZLE: Brand: REVOLUTION

## (undated) DEVICE — GLOVE SURG SZ 85 CRM LTX FREE POLYISOPRENE POLYMER BEAD ANTI

## (undated) DEVICE — SUTURE STRATAFIX SPRL SZ 2-0 L9IN ABSRB VLT MH L36MM 1/2 SXPD2B408

## (undated) DEVICE — NEEDLE SPNL 18GA L3.5IN W/ QNCKE SHARPER BVL DURA CLICK

## (undated) DEVICE — PREVENA PLUS INCISION MANAGEMENT SYSTEM: Brand: PREVENA PLUS™

## (undated) DEVICE — 450 ML BOTTLE OF 0.05% CHLORHEXIDINE GLUCONATE IN 99.95% STERILE WATER FOR IRRIGATION, USP AND APPLICATOR.: Brand: IRRISEPT ANTIMICROBIAL WOUND LAVAGE

## (undated) DEVICE — SOLUTION IRRIG 3000ML 0.9% SOD CHL USP UROMATIC PLAS CONT

## (undated) DEVICE — HYPODERMIC SAFETY NEEDLE: Brand: MAGELLAN

## (undated) DEVICE — 3M™ IOBAN™ 2 ANTIMICROBIAL INCISE DRAPE 6650EZ: Brand: IOBAN™ 2

## (undated) DEVICE — CEMENT BNE 40GM W/ GENT HI VISC RADPQ FOR REV SURG
Type: IMPLANTABLE DEVICE | Site: KNEE | Status: NON-FUNCTIONAL
Removed: 2023-08-07

## (undated) DEVICE — LIQUIBAND RAPID ADHESIVE 36/CS 0.8ML: Brand: MEDLINE

## (undated) DEVICE — SYRINGE MED 30ML STD CLR PLAS LUERLOCK TIP N CTRL DISP

## (undated) DEVICE — OPTIFOAM GENTLE AG,POST-OP STRIP,3.5X14: Brand: MEDLINE

## (undated) DEVICE — SOLUTION IRRIG 1000ML 0.9% SOD CHL USP POUR PLAS BTL

## (undated) DEVICE — STERILE PATIENT PROTECTIVE PAD FOR IMP® KNEE POSITIONERS & COHESIVE WRAP (10 / CASE): Brand: DE MAYO KNEE POSITIONER®

## (undated) DEVICE — HEADLESS TROCHAR PIN 75MM: Brand: ZUK

## (undated) DEVICE — DRAPE,MEDI-SLUSH,STERILE: Brand: MEDLINE